# Patient Record
Sex: MALE | Race: WHITE | NOT HISPANIC OR LATINO | Employment: FULL TIME | URBAN - METROPOLITAN AREA
[De-identification: names, ages, dates, MRNs, and addresses within clinical notes are randomized per-mention and may not be internally consistent; named-entity substitution may affect disease eponyms.]

---

## 2023-10-09 ENCOUNTER — APPOINTMENT (OUTPATIENT)
Dept: URGENT CARE | Age: 63
End: 2023-10-09

## 2024-02-05 ENCOUNTER — APPOINTMENT (OUTPATIENT)
Dept: LAB | Age: 64
End: 2024-02-05
Payer: COMMERCIAL

## 2024-02-05 DIAGNOSIS — J18.9 PNEUMONIA DUE TO INFECTIOUS ORGANISM, UNSPECIFIED LATERALITY, UNSPECIFIED PART OF LUNG: ICD-10-CM

## 2024-02-05 LAB
ALBUMIN SERPL BCP-MCNC: 3.8 G/DL (ref 3.5–5)
ALP SERPL-CCNC: 54 U/L (ref 34–104)
ALT SERPL W P-5'-P-CCNC: 111 U/L (ref 7–52)
ANION GAP SERPL CALCULATED.3IONS-SCNC: 9 MMOL/L
AST SERPL W P-5'-P-CCNC: 53 U/L (ref 13–39)
BASOPHILS # BLD AUTO: 0.04 THOUSANDS/ÂΜL (ref 0–0.1)
BASOPHILS NFR BLD AUTO: 1 % (ref 0–1)
BILIRUB SERPL-MCNC: 0.89 MG/DL (ref 0.2–1)
BUN SERPL-MCNC: 17 MG/DL (ref 5–25)
CALCIUM SERPL-MCNC: 8.7 MG/DL (ref 8.4–10.2)
CHLORIDE SERPL-SCNC: 104 MMOL/L (ref 96–108)
CO2 SERPL-SCNC: 25 MMOL/L (ref 21–32)
CREAT SERPL-MCNC: 1.12 MG/DL (ref 0.6–1.3)
EOSINOPHIL # BLD AUTO: 0.13 THOUSAND/ÂΜL (ref 0–0.61)
EOSINOPHIL NFR BLD AUTO: 2 % (ref 0–6)
ERYTHROCYTE [DISTWIDTH] IN BLOOD BY AUTOMATED COUNT: 14.1 % (ref 11.6–15.1)
GFR SERPL CREATININE-BSD FRML MDRD: 70 ML/MIN/1.73SQ M
GLUCOSE SERPL-MCNC: 109 MG/DL (ref 65–140)
HCT VFR BLD AUTO: 49.1 % (ref 36.5–49.3)
HGB BLD-MCNC: 15.9 G/DL (ref 12–17)
IMM GRANULOCYTES # BLD AUTO: 0.03 THOUSAND/UL (ref 0–0.2)
IMM GRANULOCYTES NFR BLD AUTO: 0 % (ref 0–2)
LYMPHOCYTES # BLD AUTO: 1.4 THOUSANDS/ÂΜL (ref 0.6–4.47)
LYMPHOCYTES NFR BLD AUTO: 18 % (ref 14–44)
MCH RBC QN AUTO: 29.4 PG (ref 26.8–34.3)
MCHC RBC AUTO-ENTMCNC: 32.4 G/DL (ref 31.4–37.4)
MCV RBC AUTO: 91 FL (ref 82–98)
MONOCYTES # BLD AUTO: 0.8 THOUSAND/ÂΜL (ref 0.17–1.22)
MONOCYTES NFR BLD AUTO: 10 % (ref 4–12)
NEUTROPHILS # BLD AUTO: 5.43 THOUSANDS/ÂΜL (ref 1.85–7.62)
NEUTS SEG NFR BLD AUTO: 69 % (ref 43–75)
NRBC BLD AUTO-RTO: 0 /100 WBCS
PLATELET # BLD AUTO: 288 THOUSANDS/UL (ref 149–390)
PMV BLD AUTO: 10.2 FL (ref 8.9–12.7)
POTASSIUM SERPL-SCNC: 4.4 MMOL/L (ref 3.5–5.3)
PROT SERPL-MCNC: 6.1 G/DL (ref 6.4–8.4)
RBC # BLD AUTO: 5.41 MILLION/UL (ref 3.88–5.62)
SODIUM SERPL-SCNC: 138 MMOL/L (ref 135–147)
WBC # BLD AUTO: 7.83 THOUSAND/UL (ref 4.31–10.16)

## 2024-02-05 PROCEDURE — 80053 COMPREHEN METABOLIC PANEL: CPT

## 2024-02-05 PROCEDURE — 36415 COLL VENOUS BLD VENIPUNCTURE: CPT

## 2024-02-05 PROCEDURE — 85025 COMPLETE CBC W/AUTO DIFF WBC: CPT

## 2024-02-09 ENCOUNTER — HOSPITAL ENCOUNTER (INPATIENT)
Facility: HOSPITAL | Age: 64
LOS: 12 days | Discharge: HOME HEALTH | DRG: 163 | End: 2024-02-21
Attending: EMERGENCY MEDICINE | Admitting: INTERNAL MEDICINE
Payer: COMMERCIAL

## 2024-02-09 ENCOUNTER — APPOINTMENT (EMERGENCY)
Dept: RADIOLOGY | Facility: HOSPITAL | Age: 64
DRG: 163 | End: 2024-02-09
Payer: COMMERCIAL

## 2024-02-09 DIAGNOSIS — I25.10 CORONARY ARTERY DISEASE INVOLVING NATIVE CORONARY ARTERY OF NATIVE HEART, UNSPECIFIED WHETHER ANGINA PRESENT: ICD-10-CM

## 2024-02-09 DIAGNOSIS — I25.10 CAD IN NATIVE ARTERY: ICD-10-CM

## 2024-02-09 DIAGNOSIS — Z95.1 S/P CABG X 2: Primary | ICD-10-CM

## 2024-02-09 DIAGNOSIS — I42.9 CARDIOMYOPATHY, UNSPECIFIED TYPE (HCC): ICD-10-CM

## 2024-02-09 DIAGNOSIS — I50.20 HEART FAILURE WITH REDUCED EJECTION FRACTION (HCC): ICD-10-CM

## 2024-02-09 DIAGNOSIS — I50.21 ACUTE SYSTOLIC CONGESTIVE HEART FAILURE (HCC): ICD-10-CM

## 2024-02-09 DIAGNOSIS — R91.8 ABNORMAL FINDING ON LUNG IMAGING: ICD-10-CM

## 2024-02-09 DIAGNOSIS — I50.9 CHF (CONGESTIVE HEART FAILURE) (HCC): ICD-10-CM

## 2024-02-09 DIAGNOSIS — I25.10 CORONARY ARTERY DISEASE INVOLVING NATIVE HEART, UNSPECIFIED VESSEL OR LESION TYPE, UNSPECIFIED WHETHER ANGINA PRESENT: ICD-10-CM

## 2024-02-09 PROBLEM — R73.03 PREDIABETES: Status: ACTIVE | Noted: 2024-02-09

## 2024-02-09 PROBLEM — N17.9 AKI (ACUTE KIDNEY INJURY) (HCC): Status: ACTIVE | Noted: 2024-02-09

## 2024-02-09 PROBLEM — R74.01 ELEVATED TRANSAMINASE LEVEL: Status: ACTIVE | Noted: 2024-02-09

## 2024-02-09 PROBLEM — I10 HTN (HYPERTENSION): Chronic | Status: ACTIVE | Noted: 2019-04-11

## 2024-02-09 PROBLEM — J90 PLEURAL EFFUSION: Status: ACTIVE | Noted: 2024-02-09

## 2024-02-09 LAB
2HR DELTA HS TROPONIN: 0 NG/L
4HR DELTA HS TROPONIN: -3 NG/L
ANION GAP SERPL CALCULATED.3IONS-SCNC: 7 MMOL/L
ATRIAL RATE: 113 BPM
BASE EX.OXY STD BLDV CALC-SCNC: 15.2 % (ref 60–80)
BASE EXCESS BLDV CALC-SCNC: -0.5 MMOL/L
BASOPHILS # BLD AUTO: 0.06 THOUSANDS/ÂΜL (ref 0–0.1)
BASOPHILS NFR BLD AUTO: 1 % (ref 0–1)
BNP SERPL-MCNC: 2788 PG/ML (ref 0–100)
BUN SERPL-MCNC: 25 MG/DL (ref 5–25)
CALCIUM SERPL-MCNC: 9.2 MG/DL (ref 8.4–10.2)
CARDIAC TROPONIN I PNL SERPL HS: 32 NG/L
CARDIAC TROPONIN I PNL SERPL HS: 35 NG/L
CARDIAC TROPONIN I PNL SERPL HS: 35 NG/L
CHLORIDE SERPL-SCNC: 104 MMOL/L (ref 96–108)
CO2 SERPL-SCNC: 25 MMOL/L (ref 21–32)
CREAT SERPL-MCNC: 1.38 MG/DL (ref 0.6–1.3)
EOSINOPHIL # BLD AUTO: 0.11 THOUSAND/ÂΜL (ref 0–0.61)
EOSINOPHIL NFR BLD AUTO: 1 % (ref 0–6)
ERYTHROCYTE [DISTWIDTH] IN BLOOD BY AUTOMATED COUNT: 14.4 % (ref 11.6–15.1)
EST. AVERAGE GLUCOSE BLD GHB EST-MCNC: 128 MG/DL
GFR SERPL CREATININE-BSD FRML MDRD: 54 ML/MIN/1.73SQ M
GLUCOSE SERPL-MCNC: 117 MG/DL (ref 65–140)
HBA1C MFR BLD: 6.1 %
HCO3 BLDV-SCNC: 25.2 MMOL/L (ref 24–30)
HCT VFR BLD AUTO: 50.6 % (ref 36.5–49.3)
HGB BLD-MCNC: 16.7 G/DL (ref 12–17)
IMM GRANULOCYTES # BLD AUTO: 0.04 THOUSAND/UL (ref 0–0.2)
IMM GRANULOCYTES NFR BLD AUTO: 0 % (ref 0–2)
LACTATE SERPL-SCNC: 1.9 MMOL/L (ref 0.5–2)
LYMPHOCYTES # BLD AUTO: 1.52 THOUSANDS/ÂΜL (ref 0.6–4.47)
LYMPHOCYTES NFR BLD AUTO: 16 % (ref 14–44)
MCH RBC QN AUTO: 30 PG (ref 26.8–34.3)
MCHC RBC AUTO-ENTMCNC: 33 G/DL (ref 31.4–37.4)
MCV RBC AUTO: 91 FL (ref 82–98)
MONOCYTES # BLD AUTO: 0.89 THOUSAND/ÂΜL (ref 0.17–1.22)
MONOCYTES NFR BLD AUTO: 9 % (ref 4–12)
NEUTROPHILS # BLD AUTO: 6.94 THOUSANDS/ÂΜL (ref 1.85–7.62)
NEUTS SEG NFR BLD AUTO: 73 % (ref 43–75)
NRBC BLD AUTO-RTO: 0 /100 WBCS
O2 CT BLDV-SCNC: 3.3 ML/DL
P AXIS: 51 DEGREES
PCO2 BLDV: 45.4 MM HG (ref 42–50)
PH BLDV: 7.36 [PH] (ref 7.3–7.4)
PLATELET # BLD AUTO: 267 THOUSANDS/UL (ref 149–390)
PMV BLD AUTO: 10 FL (ref 8.9–12.7)
PO2 BLDV: 16.4 MM HG (ref 35–45)
POTASSIUM SERPL-SCNC: 4.3 MMOL/L (ref 3.5–5.3)
PR INTERVAL: 144 MS
QRS AXIS: 116 DEGREES
QRSD INTERVAL: 104 MS
QT INTERVAL: 362 MS
QTC INTERVAL: 496 MS
RBC # BLD AUTO: 5.57 MILLION/UL (ref 3.88–5.62)
SODIUM SERPL-SCNC: 136 MMOL/L (ref 135–147)
T WAVE AXIS: -75 DEGREES
VENTRICULAR RATE: 113 BPM
WBC # BLD AUTO: 9.56 THOUSAND/UL (ref 4.31–10.16)

## 2024-02-09 PROCEDURE — 36415 COLL VENOUS BLD VENIPUNCTURE: CPT

## 2024-02-09 PROCEDURE — 93010 ELECTROCARDIOGRAM REPORT: CPT | Performed by: INTERNAL MEDICINE

## 2024-02-09 PROCEDURE — 82805 BLOOD GASES W/O2 SATURATION: CPT

## 2024-02-09 PROCEDURE — 83605 ASSAY OF LACTIC ACID: CPT

## 2024-02-09 PROCEDURE — 84484 ASSAY OF TROPONIN QUANT: CPT

## 2024-02-09 PROCEDURE — 80048 BASIC METABOLIC PNL TOTAL CA: CPT

## 2024-02-09 PROCEDURE — 80061 LIPID PANEL: CPT

## 2024-02-09 PROCEDURE — 83036 HEMOGLOBIN GLYCOSYLATED A1C: CPT

## 2024-02-09 PROCEDURE — 99285 EMERGENCY DEPT VISIT HI MDM: CPT | Performed by: EMERGENCY MEDICINE

## 2024-02-09 PROCEDURE — NC001 PR NO CHARGE: Performed by: INTERNAL MEDICINE

## 2024-02-09 PROCEDURE — 93005 ELECTROCARDIOGRAM TRACING: CPT

## 2024-02-09 PROCEDURE — 84439 ASSAY OF FREE THYROXINE: CPT

## 2024-02-09 PROCEDURE — 83880 ASSAY OF NATRIURETIC PEPTIDE: CPT

## 2024-02-09 PROCEDURE — 82728 ASSAY OF FERRITIN: CPT

## 2024-02-09 PROCEDURE — 99254 IP/OBS CNSLTJ NEW/EST MOD 60: CPT | Performed by: INTERNAL MEDICINE

## 2024-02-09 PROCEDURE — 99285 EMERGENCY DEPT VISIT HI MDM: CPT

## 2024-02-09 PROCEDURE — 85025 COMPLETE CBC W/AUTO DIFF WBC: CPT

## 2024-02-09 PROCEDURE — 83540 ASSAY OF IRON: CPT

## 2024-02-09 PROCEDURE — 87040 BLOOD CULTURE FOR BACTERIA: CPT

## 2024-02-09 PROCEDURE — 83550 IRON BINDING TEST: CPT

## 2024-02-09 PROCEDURE — 71046 X-RAY EXAM CHEST 2 VIEWS: CPT

## 2024-02-09 RX ORDER — ACETAMINOPHEN 325 MG/1
650 TABLET ORAL EVERY 4 HOURS PRN
Status: DISCONTINUED | OUTPATIENT
Start: 2024-02-09 | End: 2024-02-15

## 2024-02-09 RX ORDER — POLYETHYLENE GLYCOL 3350 17 G/17G
17 POWDER, FOR SOLUTION ORAL DAILY
Status: DISCONTINUED | OUTPATIENT
Start: 2024-02-10 | End: 2024-02-15

## 2024-02-09 RX ORDER — HEPARIN SODIUM 5000 [USP'U]/ML
5000 INJECTION, SOLUTION INTRAVENOUS; SUBCUTANEOUS EVERY 8 HOURS SCHEDULED
Status: DISCONTINUED | OUTPATIENT
Start: 2024-02-09 | End: 2024-02-15

## 2024-02-09 RX ORDER — SENNOSIDES 8.6 MG
1 TABLET ORAL DAILY
Status: DISCONTINUED | OUTPATIENT
Start: 2024-02-10 | End: 2024-02-15

## 2024-02-09 RX ORDER — MAGNESIUM HYDROXIDE/ALUMINUM HYDROXICE/SIMETHICONE 120; 1200; 1200 MG/30ML; MG/30ML; MG/30ML
30 SUSPENSION ORAL EVERY 6 HOURS PRN
Status: DISCONTINUED | OUTPATIENT
Start: 2024-02-09 | End: 2024-02-15

## 2024-02-09 RX ORDER — FUROSEMIDE 10 MG/ML
40 INJECTION INTRAMUSCULAR; INTRAVENOUS 2 TIMES DAILY
Status: DISCONTINUED | OUTPATIENT
Start: 2024-02-09 | End: 2024-02-11

## 2024-02-09 RX ORDER — ONDANSETRON 2 MG/ML
4 INJECTION INTRAMUSCULAR; INTRAVENOUS EVERY 6 HOURS PRN
Status: DISCONTINUED | OUTPATIENT
Start: 2024-02-09 | End: 2024-02-15

## 2024-02-09 RX ADMIN — HEPARIN SODIUM 5000 UNITS: 5000 INJECTION INTRAVENOUS; SUBCUTANEOUS at 22:05

## 2024-02-09 RX ADMIN — FUROSEMIDE 40 MG: 10 INJECTION, SOLUTION INTRAMUSCULAR; INTRAVENOUS at 17:51

## 2024-02-09 NOTE — ED ATTENDING ATTESTATION
2/9/2024  I, Finesse Cloud DO, saw and evaluated the patient. I have discussed the patient with the resident/non-physician practitioner and agree with the resident's/non-physician practitioner's findings, Plan of Care, and MDM as documented in the resident's/non-physician practitioner's note, except where noted. All available labs and Radiology studies were reviewed.  I was present for key portions of any procedure(s) performed by the resident/non-physician practitioner and I was immediately available to provide assistance.       At this point I agree with the current assessment done in the Emergency Department.  I have conducted an independent evaluation of this patient a history and physical is as follows:    18-year-old male with a history of hypertension, not on medications currently, accompanied by his wife.  He says about 3 weeks ago he been having some shortness of breath with exertion, orthopnea, some occasional dry nonproductive cough.  No chest pain, no fever, no chills.  Said he was seen by his primary care physician, had a x-ray performed in the office which was reportedly remarkable for bilateral bacterial pneumonia, he was started on Z-Tad which he completed, he did not get better, then he was prescribed Augmentin, did not get better, seen back and prescribed Levaquin Monday, 4 days ago.  He said his neighbor is a cardiology PA, and noticed the patient seemed to be having increased dyspnea on exertion, so the patient was seen yesterday at the PA's cardiology practice, reportedly had a normal ECG and had an echocardiogram done which reportedly showed a 10 to 20% EF, evidence of heart failure and pericardial effusion.  The patient was recommended to go to the emergency room and here for further management.Patient denies any prolonged travel history, no recent long travel, no immobilizations, or hospitalizations.    General:  Patient is well-appearing  Head:  Atraumatic  Eyes:  Conjunctiva pink  ENT:   Mucous membranes are moist  Neck:  Supple  Cardiac:  S1-S2, without murmurs  Lungs:  Clear to auscultation bilaterally but decreased breath sounds in the right base  Abdomen:  Soft, nontender, normal bowel sounds, no CVA tenderness, no tympany, no rigidity, no guarding  Extremities:  Normal range of motion, patient has some pitting pedal edema, no calf asymmetry radial pulses are equal and symmetric bilaterally  Neurologic:  Awake, fluent speech, normal comprehension, AAOx3  Skin:  Pink warm and dry  Psychiatric:  Alert, pleasant, cooperative      ED Course     ECG interpreted by me, sinus rhythm rate of 113, some nonspecific intraventricular conduction delay and nonspecific anterior T wave changes, no old available for comparison  XR chest pa & lateral   ED Interpretation   Abnormal   Chest x-ray interpreted by me shows trace pulm vascular congestion and a left-sided pleural effusion, no focal consolidation, no old available for comparison          Labs Reviewed   CBC AND DIFFERENTIAL - Abnormal       Result Value Ref Range Status    WBC 9.56  4.31 - 10.16 Thousand/uL Final    RBC 5.57  3.88 - 5.62 Million/uL Final    Hemoglobin 16.7  12.0 - 17.0 g/dL Final    Hematocrit 50.6 (*) 36.5 - 49.3 % Final    MCV 91  82 - 98 fL Final    MCH 30.0  26.8 - 34.3 pg Final    MCHC 33.0  31.4 - 37.4 g/dL Final    RDW 14.4  11.6 - 15.1 % Final    MPV 10.0  8.9 - 12.7 fL Final    Platelets 267  149 - 390 Thousands/uL Final    nRBC 0  /100 WBCs Final    Neutrophils Relative 73  43 - 75 % Final    Immat GRANS % 0  0 - 2 % Final    Lymphocytes Relative 16  14 - 44 % Final    Monocytes Relative 9  4 - 12 % Final    Eosinophils Relative 1  0 - 6 % Final    Basophils Relative 1  0 - 1 % Final    Neutrophils Absolute 6.94  1.85 - 7.62 Thousands/µL Final    Immature Grans Absolute 0.04  0.00 - 0.20 Thousand/uL Final    Lymphocytes Absolute 1.52  0.60 - 4.47 Thousands/µL Final    Monocytes Absolute 0.89  0.17 - 1.22 Thousand/µL Final     "Eosinophils Absolute 0.11  0.00 - 0.61 Thousand/µL Final    Basophils Absolute 0.06  0.00 - 0.10 Thousands/µL Final   BASIC METABOLIC PANEL - Abnormal    Sodium 136  135 - 147 mmol/L Final    Potassium 4.3  3.5 - 5.3 mmol/L Final    Chloride 104  96 - 108 mmol/L Final    CO2 25  21 - 32 mmol/L Final    ANION GAP 7  mmol/L Final    BUN 25  5 - 25 mg/dL Final    Creatinine 1.38 (*) 0.60 - 1.30 mg/dL Final    Comment: Standardized to IDMS reference method    Glucose 117  65 - 140 mg/dL Final    Comment: If the patient is fasting, the ADA then defines impaired fasting glucose as > 100 mg/dL and diabetes as > or equal to 123 mg/dL.    Calcium 9.2  8.4 - 10.2 mg/dL Final    eGFR 54  ml/min/1.73sq m Final    Narrative:     National Kidney Disease Foundation guidelines for Chronic Kidney Disease (CKD):     Stage 1 with normal or high GFR (GFR > 90 mL/min/1.73 square meters)    Stage 2 Mild CKD (GFR = 60-89 mL/min/1.73 square meters)    Stage 3A Moderate CKD (GFR = 45-59 mL/min/1.73 square meters)    Stage 3B Moderate CKD (GFR = 30-44 mL/min/1.73 square meters)    Stage 4 Severe CKD (GFR = 15-29 mL/min/1.73 square meters)    Stage 5 End Stage CKD (GFR <15 mL/min/1.73 square meters)  Note: GFR calculation is accurate only with a steady state creatinine   B-TYPE NATRIURETIC PEPTIDE (BNP) - Abnormal    BNP 2,788 (*) 0 - 100 pg/mL Final   HS TROPONIN I 0HR - Normal    hs TnI 0hr 35  \"Refer to ACS Flowchart\"- see link ng/L Final    Comment:                                              Initial (time 0) result  If >=50 ng/L, Myocardial injury suggested ;  Type of myocardial injury and treatment strategy  to be determined.  If 5-49 ng/L, a delta result at 2 hours and or 4 hours will be needed to further evaluate.  If <4 ng/L, and chest pain has been >3 hours since onset, patient may qualify for discharge based on the HEART score in the ED.  If <5 ng/L and <3hours since onset of chest pain, a delta result at 2 hours will be needed to " further evaluate.    HS Troponin 99th Percentile URL of a Health Population=12 ng/L with a 95% Confidence Interval of 8-18 ng/L.    Second Troponin (time 2 hours)  If calculated delta >= 20 ng/L,  Myocardial injury suggested ; Type of myocardial injury and treatment strategy to be determined.  If 5-49 ng/L and the calculated delta is 5-19 ng/L, consult medical service for evaluation.  Continue evaluation for ischemia on ecg and other possible etiology and repeat hs troponin at 4 hours.  If delta is <5 ng/L at 2 hours, consider discharge based on risk stratification via the HEART score (if in ED), or BRETT risk score in IP/Observation.    HS Troponin 99th Percentile URL of a Health Population=12 ng/L with a 95% Confidence Interval of 8-18 ng/L.   HS TROPONIN I 2HR   TSH, 3RD GENERATION WITH FREE T4 REFLEX   T4, FREE   BLOOD GAS, VENOUS     Patient has new onset CHF, no indication at this point for noninvasive ventilation.  ACS considered unlikely,I do not believe this patient's complaints are from pulmonary embolism and I believe they would most likely be harmed through false positive test results and other complications of testing by further pursuing the diagnosis of pulmonary embolism.    The patient was evaluated for sepsis in the emergency department. After that assessment, at the time of admission, no sepsis, severe sepsis, or septic shock was found.    DIAGNOSIS:  Acute new onset CHF, acute pedal edema, acute orthopnea    MEDICAL DECISION MAKING CODING    Patient presents with acute new problem with:  Threat to life or bodily function    Chronic conditions affecting care: As per HPI    COLLECTION AND INTERPRETATION OF DATA  Additional history obtained from: Wife    I ordered each unique test  Tests reviewed personally by me:  ECG: See my ED course  Labs: See above  Imaging: I independently reviewed the chest x-ray as noted above    Discussion with other providers: Admitting medicine  physician    RISK    Treatment:  Patient admitted    Social Determinants of Health:  Presentation to ED outside of business hours or on night shift            Critical Care Time  Procedures

## 2024-02-09 NOTE — ED PROVIDER NOTES
History  Chief Complaint   Patient presents with    Shortness of Breath     SOB and leg swelling. Recent pneumonia.      HPI    Patient is a 63-year-old male with a history of hypertension who presents with shortness of breath.  Patient reports that over the past 2 weeks he has been experiencing shortness of breath.  He was initially diagnosed with bilateral pneumonia at urgent care and prescribed Augmentin.  When the shortness of breath did not improve they changed him to Levaquin.  His symptoms have continued despite the antibiotics.  He was seen by cardiologist today who did an echo and found that his EF was decreased to 10 to 20%.  He denies chest pain.  He has no history of cardiac disease.  He denies fever.     None       History reviewed. No pertinent past medical history.    History reviewed. No pertinent surgical history.    History reviewed. No pertinent family history.  I have reviewed and agree with the history as documented.    E-Cigarette/Vaping     E-Cigarette/Vaping Substances     Social History     Tobacco Use    Smoking status: Never    Smokeless tobacco: Never   Substance Use Topics    Alcohol use: Yes        Review of Systems   Constitutional:  Negative for chills and fever.   HENT:  Negative for congestion and sore throat.    Eyes:  Negative for photophobia and visual disturbance.   Respiratory:  Positive for cough and shortness of breath.    Cardiovascular:  Negative for chest pain and palpitations.   Gastrointestinal:  Negative for abdominal pain and vomiting.   Genitourinary:  Negative for dysuria and hematuria.   Musculoskeletal:  Negative for back pain and neck pain.   Neurological:  Negative for syncope and headaches.   All other systems reviewed and are negative.      Physical Exam  ED Triage Vitals   Temperature Pulse Respirations Blood Pressure SpO2   02/09/24 1505 02/09/24 1505 02/09/24 1505 02/09/24 1505 02/09/24 1505   (!) 97 °F (36.1 °C) 69 16 (!) 119/101 97 %      Temp Source Heart  Rate Source Patient Position - Orthostatic VS BP Location FiO2 (%)   02/09/24 1900 02/10/24 0308 02/10/24 0749 02/09/24 1900 --   Oral Monitor Lying Left arm       Pain Score       02/09/24 1505       No Pain             Orthostatic Vital Signs  Vitals:    02/10/24 1519 02/10/24 1938 02/10/24 1939 02/10/24 2210   BP: 120/90 118/87 118/87 116/85   Pulse: 92 91 85 98   Patient Position - Orthostatic VS: Lying          Physical Exam  Vitals and nursing note reviewed.   Constitutional:       General: He is not in acute distress.     Appearance: He is well-developed.   HENT:      Head: Normocephalic and atraumatic.      Nose: No congestion or rhinorrhea.      Mouth/Throat:      Mouth: Mucous membranes are moist.   Eyes:      Extraocular Movements: Extraocular movements intact.      Conjunctiva/sclera: Conjunctivae normal.   Cardiovascular:      Rate and Rhythm: Normal rate and regular rhythm.      Heart sounds: No murmur heard.  Pulmonary:      Effort: Pulmonary effort is normal. No respiratory distress.      Breath sounds: No wheezing or rhonchi.      Comments: Diminished bilaterally at bases.   Abdominal:      Palpations: Abdomen is soft.      Tenderness: There is no abdominal tenderness.   Musculoskeletal:      Cervical back: Neck supple.      Right lower leg: No edema.      Left lower leg: No edema.   Skin:     General: Skin is warm and dry.      Capillary Refill: Capillary refill takes less than 2 seconds.   Neurological:      Mental Status: He is alert.   Psychiatric:         Mood and Affect: Mood normal.         ED Medications  Medications   acetaminophen (TYLENOL) tablet 650 mg (has no administration in time range)   furosemide (LASIX) injection 40 mg (40 mg Intravenous Given 2/10/24 1727)   heparin (porcine) subcutaneous injection 5,000 Units (5,000 Units Subcutaneous Given 2/10/24 2140)   senna (SENOKOT) tablet 8.6 mg (8.6 mg Oral Not Given 2/10/24 0826)   polyethylene glycol (MIRALAX) packet 17 g (17 g Oral  Not Given 2/10/24 0823)   ondansetron (ZOFRAN) injection 4 mg (has no administration in time range)   aluminum-magnesium hydroxide-simethicone (MAALOX) oral suspension 30 mL (has no administration in time range)   melatonin tablet 3 mg (3 mg Oral Given 2/10/24 2140)   losartan (COZAAR) tablet 25 mg (25 mg Oral Given 2/10/24 1225)   perflutren lipid microsphere (DEFINITY) injection (0.6 mL/min Intravenous Given 2/10/24 0927)       Diagnostic Studies  Results Reviewed       Procedure Component Value Units Date/Time    Blood culture [[id number]] Collected: 02/09/24 1812    Lab Status: Preliminary result Specimen: Blood from Arm, Right Updated: 02/10/24 2201     Blood Culture No Growth at 24 hrs.    Blood culture [[id number]] Collected: 02/09/24 1815    Lab Status: Preliminary result Specimen: Blood from Hand, Left Updated: 02/10/24 2201     Blood Culture No Growth at 24 hrs.    Comprehensive metabolic panel [[id number]]  (Abnormal) Collected: 02/10/24 0517    Lab Status: Final result Specimen: Blood from Arm, Right Updated: 02/10/24 0602     Sodium 137 mmol/L      Potassium 3.8 mmol/L      Chloride 104 mmol/L      CO2 26 mmol/L      ANION GAP 7 mmol/L      BUN 25 mg/dL      Creatinine 1.27 mg/dL      Glucose 113 mg/dL      Calcium 8.9 mg/dL      Corrected Calcium 9.4 mg/dL      AST 56 U/L       U/L      Alkaline Phosphatase 55 U/L      Total Protein 5.6 g/dL      Albumin 3.4 g/dL      Total Bilirubin 1.02 mg/dL      eGFR 60 ml/min/1.73sq m     Narrative:      National Kidney Disease Foundation guidelines for Chronic Kidney Disease (CKD):     Stage 1 with normal or high GFR (GFR > 90 mL/min/1.73 square meters)    Stage 2 Mild CKD (GFR = 60-89 mL/min/1.73 square meters)    Stage 3A Moderate CKD (GFR = 45-59 mL/min/1.73 square meters)    Stage 3B Moderate CKD (GFR = 30-44 mL/min/1.73 square meters)    Stage 4 Severe CKD (GFR = 15-29 mL/min/1.73 square meters)    Stage 5 End Stage CKD (GFR <15 mL/min/1.73 square  "meters)  Note: GFR calculation is accurate only with a steady state creatinine    Magnesium [[id number]]  (Normal) Collected: 02/10/24 0517    Lab Status: Final result Specimen: Blood from Arm, Right Updated: 02/10/24 0602     Magnesium 2.0 mg/dL     CBC and differential [[id number]] Collected: 02/10/24 0517    Lab Status: Final result Specimen: Blood from Arm, Right Updated: 02/10/24 0535     WBC 9.35 Thousand/uL      RBC 5.23 Million/uL      Hemoglobin 15.3 g/dL      Hematocrit 47.6 %      MCV 91 fL      MCH 29.3 pg      MCHC 32.1 g/dL      RDW 14.1 %      MPV 9.7 fL      Platelets 248 Thousands/uL      nRBC 0 /100 WBCs      Neutrophils Relative 66 %      Immat GRANS % 0 %      Lymphocytes Relative 20 %      Monocytes Relative 10 %      Eosinophils Relative 3 %      Basophils Relative 1 %      Neutrophils Absolute 6.20 Thousands/µL      Immature Grans Absolute 0.03 Thousand/uL      Lymphocytes Absolute 1.86 Thousands/µL      Monocytes Absolute 0.93 Thousand/µL      Eosinophils Absolute 0.26 Thousand/µL      Basophils Absolute 0.07 Thousands/µL     Lipid Panel with Direct LDL reflex [[id number]]  (Abnormal) Collected: 02/09/24 1613    Lab Status: Final result Specimen: Blood from Arm, Left Updated: 02/10/24 0233     Cholesterol 108 mg/dL      Triglycerides 96 mg/dL      HDL, Direct 23 mg/dL      LDL Calculated 66 mg/dL     T4, free [[id number]]  (Abnormal) Collected: 02/09/24 1613    Lab Status: Final result Specimen: Blood from Arm, Left Updated: 02/10/24 0233     Free T4 1.41 ng/dL     Narrative:        \"Therapeutic range for patients medicated with thyroid disorders: 0.61-1.24 ng/dL.\"    TIBC Panel (incl. Iron, TIBC, % Iron Saturation) [[id number]]  (Normal) Collected: 02/09/24 1613    Lab Status: Final result Specimen: Blood from Arm, Left Updated: 02/10/24 0233     Iron Saturation 15 %      TIBC 338 ug/dL      Iron 52 ug/dL      UIBC 286 ug/dL     Ferritin [[id number]]  (Normal) Collected: 02/09/24 1613    Lab " Status: Final result Specimen: Blood from Arm, Left Updated: 02/10/24 0233     Ferritin 121 ng/mL     Hemoglobin A1C [[id number]]  (Abnormal) Collected: 02/09/24 1613    Lab Status: Final result Specimen: Blood from Arm, Left Updated: 02/09/24 2341     Hemoglobin A1C 6.1 %       mg/dl     HS Troponin I 4hr [[id number]]  (Normal) Collected: 02/09/24 2023    Lab Status: Final result Specimen: Blood from Arm, Right Updated: 02/09/24 2106     hs TnI 4hr 32 ng/L      Delta 4hr hsTnI -3 ng/L     HS Troponin I 2hr [[id number]]  (Normal) Collected: 02/09/24 1815    Lab Status: Final result Specimen: Blood from Hand, Left Updated: 02/09/24 1908     hs TnI 2hr 35 ng/L      Delta 2hr hsTnI 0 ng/L     Blood gas, venous [[id number]]  (Abnormal) Collected: 02/09/24 1738    Lab Status: Final result Specimen: Blood from Arm, Left Updated: 02/09/24 1747     pH, Dionicio 7.363     pCO2, Dionicio 45.4 mm Hg      pO2, Dionicio 16.4 mm Hg      HCO3, Dionicio 25.2 mmol/L      Base Excess, Dionicio -0.5 mmol/L      O2 Content, Dionicio 3.3 ml/dL      O2 HGB, VENOUS 15.2 %     HS Troponin 0hr (reflex protocol) [[id number]]  (Normal) Collected: 02/09/24 1613    Lab Status: Final result Specimen: Blood from Arm, Left Updated: 02/09/24 1700     hs TnI 0hr 35 ng/L     B-Type Natriuretic Peptide(BNP) [[id number]]  (Abnormal) Collected: 02/09/24 1613    Lab Status: Final result Specimen: Blood from Arm, Left Updated: 02/09/24 1658     BNP 2,788 pg/mL     Basic metabolic panel [[id number]]  (Abnormal) Collected: 02/09/24 1613    Lab Status: Final result Specimen: Blood from Arm, Left Updated: 02/09/24 1654     Sodium 136 mmol/L      Potassium 4.3 mmol/L      Chloride 104 mmol/L      CO2 25 mmol/L      ANION GAP 7 mmol/L      BUN 25 mg/dL      Creatinine 1.38 mg/dL      Glucose 117 mg/dL      Calcium 9.2 mg/dL      eGFR 54 ml/min/1.73sq m     Narrative:      National Kidney Disease Foundation guidelines for Chronic Kidney Disease (CKD):     Stage 1 with normal or high GFR (GFR  > 90 mL/min/1.73 square meters)    Stage 2 Mild CKD (GFR = 60-89 mL/min/1.73 square meters)    Stage 3A Moderate CKD (GFR = 45-59 mL/min/1.73 square meters)    Stage 3B Moderate CKD (GFR = 30-44 mL/min/1.73 square meters)    Stage 4 Severe CKD (GFR = 15-29 mL/min/1.73 square meters)    Stage 5 End Stage CKD (GFR <15 mL/min/1.73 square meters)  Note: GFR calculation is accurate only with a steady state creatinine    CBC and differential [[id number]]  (Abnormal) Collected: 02/09/24 1613    Lab Status: Final result Specimen: Blood from Arm, Left Updated: 02/09/24 1630     WBC 9.56 Thousand/uL      RBC 5.57 Million/uL      Hemoglobin 16.7 g/dL      Hematocrit 50.6 %      MCV 91 fL      MCH 30.0 pg      MCHC 33.0 g/dL      RDW 14.4 %      MPV 10.0 fL      Platelets 267 Thousands/uL      nRBC 0 /100 WBCs      Neutrophils Relative 73 %      Immat GRANS % 0 %      Lymphocytes Relative 16 %      Monocytes Relative 9 %      Eosinophils Relative 1 %      Basophils Relative 1 %      Neutrophils Absolute 6.94 Thousands/µL      Immature Grans Absolute 0.04 Thousand/uL      Lymphocytes Absolute 1.52 Thousands/µL      Monocytes Absolute 0.89 Thousand/µL      Eosinophils Absolute 0.11 Thousand/µL      Basophils Absolute 0.06 Thousands/µL                    XR chest pa & lateral   ED Interpretation by Finesse Cloud DO (02/09 1717)   Abnormal   Chest x-ray interpreted by me shows trace pulm vascular congestion and a left-sided pleural effusion, no focal consolidation, no old available for comparison      Final Result by Placido Lugo MD (02/09 2300)      Moderate bilateral pleural effusions with bibasilar atelectasis and/or airspace disease noted.      Workstation performed: [id number]               Procedures  ECG 12 Lead Documentation Only    Date/Time: 2/9/2024 3:15 PM    Performed by: Araceli Amos MD  Authorized by: Araceli Amos MD    ECG reviewed by me, the ED Provider: yes    Patient location:  ED  Previous ECG:     Previous  ECG:  Unavailable  Interpretation:     Interpretation: abnormal    Rate:     ECG rate:  113    ECG rate assessment: tachycardic    Rhythm:     Rhythm: sinus tachycardia    Ectopy:     Ectopy: none    QRS:     QRS axis:  Right    QRS intervals:  Normal  Conduction:     Conduction: normal    ST segments:     ST segments:  Abnormal    Elevation:  V1, V2 and V3  T waves:     T waves: non-specific          ED Course  ED Course as of 02/10/24 2227   Fri Feb 09, 2024   1704 BNP(!): 2,788   1704 hs TnI 0hr: 35   1738 SO:    1740 SO: Admitted to [facility] for CHF                                        Medical Decision Making  Patient is a 63-year-old male with a history of hypertension who presents with shortness of breath.    Differential includes CHF, pneumonia, pneumothorax, ACS.  Will order EKG, CBC, BMP, troponin, BNP, and chest x-ray.  EKG shows sinus tachycardia with ST abnormalities.  BNP is elevated.  Creatinine is elevated.  Unknown baseline.  Chest x-ray shows small bilateral pleural effusions.  Will admit patient for new onset failure.  Patient was admitted to medicine.    Amount and/or Complexity of Data Reviewed  Labs: ordered. Decision-making details documented in ED Course.  Radiology: ordered and independent interpretation performed.  ECG/medicine tests: ordered.  Discussion of management or test interpretation with external provider(s): Internal medicine     Risk  Decision regarding hospitalization.          Disposition  Final diagnoses:   CHF (congestive heart failure) (HCC)     Time reflects when diagnosis was documented in both MDM as applicable and the Disposition within this note       Time User Action Codes Description Comment    2/9/2024  5:22 PM Araceli Amos Add [I50.9] CHF (congestive heart failure) (HCC)     2/9/2024  9:27 PM Camila Joy Add [I50.21] Acute systolic congestive heart failure (HCC)     2/10/2024 11:36 AM Kate Barillas Add [I50.20] Heart failure with reduced ejection fraction  (HCC)           ED Disposition       ED Disposition   Admit    Condition   Stable    Date/Time   Fri Feb 9, 2024  5:21 PM    Comment   Case was discussed with SOD and the patient's admission status was agreed to be Admission Status: inpatient status to the service of Dr. Menjivar .               Follow-up Information    None         Current Discharge Medication List        START taking these medications    Details   Empagliflozin (JARDIANCE) 10 MG TABS tablet Take 1 tablet (10 mg total) by mouth daily  Qty: 30 tablet, Refills: 0    Comments: Price check only  Associated Diagnoses: Heart failure with reduced ejection fraction (HCC)      sacubitril-valsartan (Entresto) 24-26 MG TABS Take 1 tablet by mouth 2 (two) times a day  Qty: 60 tablet, Refills: 0    Comments: For price check only  Associated Diagnoses: Heart failure with reduced ejection fraction (HCC)           No discharge procedures on file.    PDMP Review       None             ED Provider  Attending physically available and evaluated Rohan Don. I managed the patient along with the ED Attending.    Electronically Signed by           Araceli Amos MD  02/10/24 1372

## 2024-02-10 ENCOUNTER — APPOINTMENT (INPATIENT)
Dept: NON INVASIVE DIAGNOSTICS | Facility: HOSPITAL | Age: 64
DRG: 163 | End: 2024-02-10
Payer: COMMERCIAL

## 2024-02-10 LAB
ALBUMIN SERPL BCP-MCNC: 3.4 G/DL (ref 3.5–5)
ALP SERPL-CCNC: 55 U/L (ref 34–104)
ALT SERPL W P-5'-P-CCNC: 115 U/L (ref 7–52)
ANION GAP SERPL CALCULATED.3IONS-SCNC: 7 MMOL/L
AORTIC ROOT: 3.7 CM
AORTIC VALVE MEAN VELOCITY: 4.2 M/S
APICAL FOUR CHAMBER EJECTION FRACTION: 15 %
ASCENDING AORTA: 3.3 CM
AST SERPL W P-5'-P-CCNC: 56 U/L (ref 13–39)
AV LVOT MEAN GRADIENT: 0 MMHG
AV LVOT PEAK GRADIENT: 1 MMHG
AV MEAN GRADIENT: 1 MMHG
AV PEAK GRADIENT: 1 MMHG
AV VALVE AREA: 2.86 CM2
AV VELOCITY RATIO: 0.74
BASOPHILS # BLD AUTO: 0.07 THOUSANDS/ÂΜL (ref 0–0.1)
BASOPHILS NFR BLD AUTO: 1 % (ref 0–1)
BILIRUB SERPL-MCNC: 1.02 MG/DL (ref 0.2–1)
BSA FOR ECHO PROCEDURE: 1.8 M2
BUN SERPL-MCNC: 25 MG/DL (ref 5–25)
CALCIUM ALBUM COR SERPL-MCNC: 9.4 MG/DL (ref 8.3–10.1)
CALCIUM SERPL-MCNC: 8.9 MG/DL (ref 8.4–10.2)
CHLORIDE SERPL-SCNC: 104 MMOL/L (ref 96–108)
CHOLEST SERPL-MCNC: 108 MG/DL
CO2 SERPL-SCNC: 26 MMOL/L (ref 21–32)
CREAT SERPL-MCNC: 1.27 MG/DL (ref 0.6–1.3)
DOP CALC AO PEAK VEL: 0.54 M/S
DOP CALC AO VTI: 6.65 CM
DOP CALC LVOT AREA: 3.8 CM2
DOP CALC LVOT DIAMETER: 2.2 CM
DOP CALC LVOT PEAK VEL VTI: 5.01 CM
DOP CALC LVOT PEAK VEL: 0.4 M/S
DOP CALC LVOT STROKE INDEX: 6.9 ML/M2
DOP CALC LVOT STROKE VOLUME: 19.03 CM3
E WAVE DECELERATION TIME: 87 MS
E/A RATIO: 2
EOSINOPHIL # BLD AUTO: 0.26 THOUSAND/ÂΜL (ref 0–0.61)
EOSINOPHIL NFR BLD AUTO: 3 % (ref 0–6)
ERYTHROCYTE [DISTWIDTH] IN BLOOD BY AUTOMATED COUNT: 14.1 % (ref 11.6–15.1)
FERRITIN SERPL-MCNC: 121 NG/ML (ref 24–336)
FRACTIONAL SHORTENING: 10 (ref 28–44)
GFR SERPL CREATININE-BSD FRML MDRD: 60 ML/MIN/1.73SQ M
GLUCOSE SERPL-MCNC: 113 MG/DL (ref 65–140)
HCT VFR BLD AUTO: 47.6 % (ref 36.5–49.3)
HDLC SERPL-MCNC: 23 MG/DL
HGB BLD-MCNC: 15.3 G/DL (ref 12–17)
IMM GRANULOCYTES # BLD AUTO: 0.03 THOUSAND/UL (ref 0–0.2)
IMM GRANULOCYTES NFR BLD AUTO: 0 % (ref 0–2)
INTERVENTRICULAR SEPTUM IN DIASTOLE (PARASTERNAL SHORT AXIS VIEW): 1 CM
INTERVENTRICULAR SEPTUM: 1 CM (ref 0.6–1.1)
IRON SATN MFR SERPL: 15 % (ref 15–50)
IRON SERPL-MCNC: 52 UG/DL (ref 50–212)
LAAS-AP2: 23.2 CM2
LAAS-AP4: 28.3 CM2
LDLC SERPL CALC-MCNC: 66 MG/DL (ref 0–100)
LEFT ATRIUM SIZE: 4 CM
LEFT ATRIUM VOLUME (MOD BIPLANE): 89 ML
LEFT ATRIUM VOLUME INDEX (MOD BIPLANE): 49.4 ML/M2
LEFT INTERNAL DIMENSION IN SYSTOLE: 5.4 CM (ref 2.1–4)
LEFT VENTRICLE DIASTOLIC VOLUME (MOD BIPLANE): 144 ML
LEFT VENTRICLE DIASTOLIC VOLUME INDEX (MOD BIPLANE): 80 ML/M2
LEFT VENTRICLE SYSTOLIC VOLUME (MOD BIPLANE): 120 ML
LEFT VENTRICLE SYSTOLIC VOLUME INDEX (MOD BIPLANE): 66.7 ML/M2
LEFT VENTRICULAR INTERNAL DIMENSION IN DIASTOLE: 6 CM (ref 3.5–6)
LEFT VENTRICULAR POSTERIOR WALL IN END DIASTOLE: 1 CM
LEFT VENTRICULAR STROKE VOLUME: 38 ML
LV EF: 17 %
LVSV (TEICH): 38 ML
LYMPHOCYTES # BLD AUTO: 1.86 THOUSANDS/ÂΜL (ref 0.6–4.47)
LYMPHOCYTES NFR BLD AUTO: 20 % (ref 14–44)
Lab: 1.13
Lab: 2.04 L/MIN
MAGNESIUM SERPL-MCNC: 2 MG/DL (ref 1.9–2.7)
MCH RBC QN AUTO: 29.3 PG (ref 26.8–34.3)
MCHC RBC AUTO-ENTMCNC: 32.1 G/DL (ref 31.4–37.4)
MCV RBC AUTO: 91 FL (ref 82–98)
MONOCYTES # BLD AUTO: 0.93 THOUSAND/ÂΜL (ref 0.17–1.22)
MONOCYTES NFR BLD AUTO: 10 % (ref 4–12)
MV E'TISSUE VEL-SEP: 4 CM/S
MV PEAK A VEL: 0.3 M/S
MV PEAK E VEL: 60 CM/S
MV STENOSIS PRESSURE HALF TIME: 25 MS
MV VALVE AREA P 1/2 METHOD: 8.8
NEUTROPHILS # BLD AUTO: 6.2 THOUSANDS/ÂΜL (ref 1.85–7.62)
NEUTS SEG NFR BLD AUTO: 66 % (ref 43–75)
NRBC BLD AUTO-RTO: 0 /100 WBCS
PLATELET # BLD AUTO: 248 THOUSANDS/UL (ref 149–390)
PMV BLD AUTO: 9.7 FL (ref 8.9–12.7)
POTASSIUM SERPL-SCNC: 3.8 MMOL/L (ref 3.5–5.3)
PROT SERPL-MCNC: 5.6 G/DL (ref 6.4–8.4)
RA PRESSURE ESTIMATED: 15 MMHG
RBC # BLD AUTO: 5.23 MILLION/UL (ref 3.88–5.62)
RIGHT VENTRICLE ID DIMENSION: 4.9 CM
RV PSP: 52 MMHG
SL CV ECHO HEART RATE: 107 BPM
SL CV LEFT ATRIUM LENGTH A2C: 6 CM
SL CV LV EF: 20
SL CV PED ECHO LEFT VENTRICLE DIASTOLIC VOLUME (MOD BIPLANE) 2D: 178 ML
SL CV PED ECHO LEFT VENTRICLE SYSTOLIC VOLUME (MOD BIPLANE) 2D: 140 ML
SODIUM SERPL-SCNC: 137 MMOL/L (ref 135–147)
T4 FREE SERPL-MCNC: 1.41 NG/DL (ref 0.61–1.12)
TIBC SERPL-MCNC: 338 UG/DL (ref 250–450)
TR MAX PG: 37 MMHG
TR PEAK VELOCITY: 3 M/S
TRICUSPID ANNULAR PLANE SYSTOLIC EXCURSION: 1 CM
TRICUSPID VALVE PEAK REGURGITATION VELOCITY: 3.02 M/S
TRIGL SERPL-MCNC: 96 MG/DL
TSH SERPL DL<=0.05 MIU/L-ACNC: 2.38 UIU/ML (ref 0.45–4.5)
UIBC SERPL-MCNC: 286 UG/DL (ref 155–355)
WBC # BLD AUTO: 9.35 THOUSAND/UL (ref 4.31–10.16)

## 2024-02-10 PROCEDURE — 99233 SBSQ HOSP IP/OBS HIGH 50: CPT | Performed by: INTERNAL MEDICINE

## 2024-02-10 PROCEDURE — 93306 TTE W/DOPPLER COMPLETE: CPT | Performed by: INTERNAL MEDICINE

## 2024-02-10 PROCEDURE — 84443 ASSAY THYROID STIM HORMONE: CPT | Performed by: INTERNAL MEDICINE

## 2024-02-10 PROCEDURE — 83735 ASSAY OF MAGNESIUM: CPT

## 2024-02-10 PROCEDURE — 80053 COMPREHEN METABOLIC PANEL: CPT

## 2024-02-10 PROCEDURE — 93306 TTE W/DOPPLER COMPLETE: CPT

## 2024-02-10 PROCEDURE — 85025 COMPLETE CBC W/AUTO DIFF WBC: CPT

## 2024-02-10 RX ORDER — SACUBITRIL AND VALSARTAN 24; 26 MG/1; MG/1
1 TABLET, FILM COATED ORAL 2 TIMES DAILY
Qty: 60 TABLET | Refills: 0 | Status: SHIPPED | OUTPATIENT
Start: 2024-02-10 | End: 2024-02-21

## 2024-02-10 RX ORDER — LOSARTAN POTASSIUM 25 MG/1
25 TABLET ORAL DAILY
Status: DISCONTINUED | OUTPATIENT
Start: 2024-02-10 | End: 2024-02-14

## 2024-02-10 RX ORDER — LANOLIN ALCOHOL/MO/W.PET/CERES
3 CREAM (GRAM) TOPICAL
Status: DISCONTINUED | OUTPATIENT
Start: 2024-02-10 | End: 2024-02-21 | Stop reason: HOSPADM

## 2024-02-10 RX ADMIN — FUROSEMIDE 40 MG: 10 INJECTION, SOLUTION INTRAMUSCULAR; INTRAVENOUS at 17:27

## 2024-02-10 RX ADMIN — HEPARIN SODIUM 5000 UNITS: 5000 INJECTION INTRAVENOUS; SUBCUTANEOUS at 05:18

## 2024-02-10 RX ADMIN — HEPARIN SODIUM 5000 UNITS: 5000 INJECTION INTRAVENOUS; SUBCUTANEOUS at 14:23

## 2024-02-10 RX ADMIN — LOSARTAN POTASSIUM 25 MG: 25 TABLET, FILM COATED ORAL at 12:25

## 2024-02-10 RX ADMIN — FUROSEMIDE 40 MG: 10 INJECTION, SOLUTION INTRAMUSCULAR; INTRAVENOUS at 08:24

## 2024-02-10 RX ADMIN — PERFLUTREN 0.6 ML/MIN: 6.52 INJECTION, SUSPENSION INTRAVENOUS at 09:27

## 2024-02-10 RX ADMIN — MELATONIN 3 MG: at 21:40

## 2024-02-10 RX ADMIN — MELATONIN 3 MG: at 01:29

## 2024-02-10 RX ADMIN — HEPARIN SODIUM 5000 UNITS: 5000 INJECTION INTRAVENOUS; SUBCUTANEOUS at 21:40

## 2024-02-10 NOTE — H&P
INTERNAL MEDICINE RESIDENCY ADMISSION H&P     Name: Rohan Don   Age & Sex: 63 y.o. male   MRN: [id number]  Unit/Bed#: [facility] 528-01   Encounter: [id number]  Primary Care Provider: No primary care provider on file.    Code Status: Level 1 - Full Code  Admission Status: INPATIENT   Disposition: Patient requires Med/Surg with Telemetry    Admit to team: SOD Team A    ASSESSMENT/PLAN     Principal Problem:    Acute systolic congestive heart failure (HCC)  Active Problems:    Prediabetes    DEISI (acute kidney injury) (HCC)    Elevated transaminase level    HTN (hypertension)    Pleural effusion      * Acute systolic congestive heart failure (HCC)  Assessment & Plan  Wt Readings from Last 3 Encounters:   02/09/24 64.9 kg (143 lb)     Noted EF 20% LVEF on outpatient echo per patient. His neighbor, a physician assistant, recommended he come to the cardiac center she works at to get an EKG and TTE. After echo was read, patient was recommended to immediately go to ED for evaluation.  +BNP on admission 2788  Sinus tachycardia on admission  EKG w/L atrial enlargement/hypertrophy, R axis, nonspecific ST changes w/negative troponins x3   ms  QtC 496 ms    Lab Results   Component Value Date    HSTNI0 35 02/09/2024    HSTNI2 35 02/09/2024    HSTNID2 0 02/09/2024    HSTNI4 32 02/09/2024    HSTNID4 -3 02/09/2024         Plan:  Diurese w/40 mg IV lasix BID to start  Goal net negative 500 mL to 1L/day  F/u TTE to evaluate for EF, wall motion patterns, valvular disease, & IVC  Respiratory protocol, maintain O2 sat >88%  Can add BiPAP PRN if patient develops increased WOB  Cardiology consult for AM  Initiate GDMT once stabilized: BB, ACEi/ARB/ARNI, SGLT2i, +/-spironolactone  Defer further workup for etiology e.g. amyloid w/cMRI, ischemic w/u, to cardiology  Nutrition consult  Start telemetry to monitor for arrhythmias   I/Os  Daily standing weights  2L fluid restriction, 2g Na cardiac diet  Check A1C, lipid panel  Can  consider lyme depending on patient's exposures  Check TSH & free T4  Check iron panel to eval for hemachromatosis                 Pleural effusion  Assessment & Plan  Per my wet read of X ray, appears to have mild LLL pleural effusion  Suspect 2/2 CHF    Trial diuresis to trend improvement  Would hold off on thoracentesis unless ruling out infection or malignancy  Can further characterize w/CT if concern for malignancy give recent -10 lbs weight loss, though n obvious risk factors for malignancy and cancer screenings up to date      HTN (hypertension)  Assessment & Plan  Managed with diet/exercise per patient and previously has been well controlled    BP on admission largely normotensive    Plan:  IV lasix 40 mg BID  Goal normotension and MAP>65 mmHg while diuresing        Elevated transaminase level  Assessment & Plan  Noted on 2/5/24 labs (4 days prior to admission)    Suspect congestive hepatopathy from CHF vs. DILI from 3 weeks of abx vs. rarer etiologies such as hemachromatosis    Plan:  Repeat LFTs w/AM labs 2/10 to confirm persistent elevation  Depending on results of repeat labs, can consider RUQ U/S +/- hepatitis labs for chronic exposures  Check iron panel      DEISI (acute kidney injury) (HCC)  Assessment & Plan  Recent Labs     02/09/24  1613   CREATININE 1.38*   EGFR 54     Estimated Creatinine Clearance: 50.9 mL/min (A) (by C-G formula based on SCr of 1.38 mg/dL (H)).    Presumably DEISI as Cr >1.0 by greater than 25% per KDIGO  Suspect prerenal due to CHF as supported by abnormal LFTs (hepatic congestion)  Will need Bmp follow up in 3+ months to determine if component of CKD present  Avoid nephrotoxins  Avoid ACEi/ARNi until stabilized re:CHF    Prediabetes  Assessment & Plan  Managed w/[employer] in [place], [place] (patient's prior residence) with diet/exercise    Check HbA1C in AM        VTE Pharmacologic Prophylaxis: Heparin  VTE Mechanical Prophylaxis: sequential compression device    CHIEF COMPLAINT     Chief  Complaint   Patient presents with    Shortness of Breath     SOB and leg swelling. Recent pneumonia.       HISTORY OF PRESENT ILLNESS     62 yo M hx preDM, HTN managed via diet/exercise presenting for 3 weeks of SOB recently found to have EF 20-25% on outpatient echo.     Patient states that he is new to the area and previously lived in [facility].  He moved here in October 2023 with his wife, who is present at bedside.  Patient has not been able to set up new PCP or health insurance few months.  For this reason, patient went to urgent care 3 separate times to get evaluated for shortness of breath. See below for abx written for each week patient was seen by urgent care.     Symptoms first started 2-3 weeks ago, but have been most pronounced ever since 1/29/24  Severity is rated as moderate, but concerning to the patient as he never had this before.  Timing is constant, worst at night  Overall it is worsening.  It is improved with sitting up or propping himself up at night with several pillows to get more refreshing sleep.   It is worsened by laying down/flat  The patient denies any sick contacts. he isup to date [facility] [place].  The symptoms do awaken the patient from sleep (frequent awakenings).  Patient has tried:  Azithromicyn 500/250 x6 days, followed by  Albuterol HFA inhaler tried for 5 days without improvement  Amox-clav 875-125 x14 days  Levofloxacin 500 mg x 7 days  None of these scripts written at urgent care helped relieve sx. Patient was told he had PNA based on CXR findings at urgent care and was written for additional abx each time he re-presented stating his sx failed to improve.      Patient lives next door to physician assistant, who recommended patient to come to her workplace (heart center) today (day of admission) for EKG & echo. Patient states EKG was WNL, but echo done showed HFrEF 20%. Patient was told to go immediately to ED for further treatment.      Associated sx:  Has recently had a decreased appetite, has lost some weight on the face.  143 lbs now (2/9) down from 156 lbs in October 2023  Patient is still establishing care here, so he has no PCP.  Re: Colonoscopies, etc. - patient states his cancer screenings are up to date w/[facility]    Denies: fevers/chills, chest pain,  heart palpitations, nausea, vomiting, abdominal pain, weakness, or numbness.    Prior medical records:  [place], [place].  Moved to [place] in October 2023 to be closer to daughter-in-law, who lives in [place] and works in [employer].      Work:  x6 years, formerly in [employer] in [employer].  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 No herbal supplements    Admit to SOD-A for acute CHF, newly dx. Full Lv 1 code. No PCP.      REVIEW OF SYSTEMS     Review of Systems   Constitutional:  Positive for fatigue. Negative for chills and fever.   Respiratory:  Positive for shortness of breath. Negative for cough and wheezing.    Cardiovascular:  Negative for chest pain and palpitations.   Gastrointestinal:  Negative for abdominal pain, constipation, diarrhea, nausea and vomiting.   Skin:  Negative for rash.   Neurological:  Negative for weakness and numbness.   Psychiatric/Behavioral:  Positive for sleep disturbance (frequent awakenings, needs multiple pillows to sleep at night).      OBJECTIVE     Vitals:    02/09/24 1505 02/09/24 1707 02/09/24 1900 02/09/24 2227   BP: (!) 119/101 132/97 119/84 118/86   BP Location:   Left arm    Pulse: 69 104  (!) 110   Resp: 16 20 20 22   Temp: (!) 97 °F (36.1 °C)  (!) 97.3 °F (36.3 °C) (!) 97.1 °F (36.2 °C)   TempSrc:   Oral    SpO2: 97% 98%  95%   Weight: 64.9 kg (143 lb)      Height: 5'  "11\" (1.803 m)         Temperature:   Temp (24hrs), Av.1 °F (36.2 °C), Min:97 °F (36.1 °C), Max:97.3 °F (36.3 °C)    Temperature: (!) 97.1 °F (36.2 °C)  Intake & Output:  I/O       None          Weights:   IBW (Ideal Body Weight): 75.3 kg    Body mass index is 19.94 kg/m².  Weight (last 2 days)       Date/Time Weight    24 1505 64.9 (143)          Physical Exam  Vitals reviewed.   Constitutional:       General: He is not in acute distress.  HENT:      Head: Normocephalic and atraumatic.      Mouth/Throat:      Mouth: Mucous membranes are moist.      Pharynx: Oropharynx is clear.   Eyes:      General: No scleral icterus.     Extraocular Movements: Extraocular movements intact.   Cardiovascular:      Rate and Rhythm: Regular rhythm. Tachycardia present.      Heart sounds: No murmur heard.     No friction rub. No gallop.   Pulmonary:      Effort: Pulmonary effort is normal. No respiratory distress.      Breath sounds: No stridor. No wheezing.      Comments: Diminished BS LLL  Abdominal:      General: Bowel sounds are normal. There is no distension.      Palpations: There is no mass.      Tenderness: There is no abdominal tenderness. There is no guarding.   Musculoskeletal:         General: Normal range of motion.   Skin:     General: Skin is warm and dry.      Findings: No rash.   Neurological:      Mental Status: He is alert.      Sensory: No sensory deficit.   Psychiatric:         Mood and Affect: Mood normal.         Behavior: Behavior normal.         Thought Content: Thought content normal.       PAST MEDICAL HISTORY   No past medical history on file.  PAST SURGICAL HISTORY   No past surgical history on file.  SOCIAL & FAMILY HISTORY     Social History     Substance and Sexual Activity   Alcohol Use Not on file       Social History     Substance and Sexual Activity   Drug Use Not on file     Social History     Tobacco Use   Smoking Status Not on file   Smokeless Tobacco Not on file     No family history on " "file.  LABORATORY DATA     Labs: I have personally reviewed pertinent reports.    Results from last 7 days   Lab Units 02/09/24  1613 02/05/24  1145   WBC Thousand/uL 9.56 7.83   HEMOGLOBIN g/dL 16.7 15.9   HEMATOCRIT % 50.6* 49.1   PLATELETS Thousands/uL 267 288   NEUTROS PCT % 73 69   MONOS PCT % 9 10   EOS PCT % 1 2      Results from last 7 days   Lab Units 02/09/24  1613 02/05/24  1145   POTASSIUM mmol/L 4.3 4.4   CHLORIDE mmol/L 104 104   CO2 mmol/L 25 25   BUN mg/dL 25 17   CREATININE mg/dL 1.38* 1.12   CALCIUM mg/dL 9.2 8.7   ALK PHOS U/L  --  54   ALT U/L  --  111*   AST U/L  --  53*                  Results from last 7 days   Lab Units 02/09/24  1925   LACTIC ACID mmol/L 1.9         Micro:  No results found for: \"BLOODCX\", \"URINECX\", \"WOUNDCULT\", \"SPUTUMCULTUR\"  IMAGING & DIAGNOSTIC TESTS     Imaging: I have personally reviewed pertinent reports.    No results found.  EKG, Pathology, and Other Studies: I have personally reviewed pertinent reports.     ALLERGIES   No Known Allergies  MEDICATIONS PRIOR TO ARRIVAL     Prior to Admission medications    Not on File     MEDICATIONS ADMINISTERED IN LAST 24 HOURS     Medication Administration - last 24 hours from 02/08/2024 2229 to 02/09/2024 2229         Date/Time Order Dose Route Action Action by     02/09/2024 1751 EST furosemide (LASIX) injection 40 mg 40 mg Intravenous Given Eliane Huber RN     02/09/2024 2205 EST heparin (porcine) subcutaneous injection 5,000 Units 5,000 Units Subcutaneous Given Candida Hong RN          CURRENT MEDICATIONS     Current Facility-Administered Medications   Medication Dose Route Frequency Provider Last Rate    acetaminophen  650 mg Oral Q4H PRN Camila Joy MD      aluminum-magnesium hydroxide-simethicone  30 mL Oral Q6H PRN Camila Joy MD      furosemide  40 mg Intravenous BID Camila Joy MD      heparin (porcine)  5,000 Units Subcutaneous Q8H [facility] Camila Joy MD      ondansetron  4 mg Intravenous Q6H " "PRN Camila Joy MD      [START ON 2/10/2024] polyethylene glycol  17 g Oral Daily Camila Joy MD      [START ON 2/10/2024] senna  1 tablet Oral Daily Camila Joy MD          acetaminophen, 650 mg, Q4H PRN  aluminum-magnesium hydroxide-simethicone, 30 mL, Q6H PRN  ondansetron, 4 mg, Q6H PRN        Admission Time  I spent 45 minutes admitting the patient.  This involved direct patient contact where I performed a full history and physical, reviewing previous records, and reviewing laboratory and other diagnostic studies.    Portions of the record may have been created with voice recognition software.  Occasional wrong word or \"sound a like\" substitutions may have occurred due to the inherent limitations of voice recognition software.  Read the chart carefully and recognize, using context, where substitutions have occurred.    ==  Camila Joy MD  [facility]  Internal Medicine Residency PGY-3    " [employer]

## 2024-02-10 NOTE — ASSESSMENT & PLAN NOTE
Noted on 2/5/24 labs (4 days prior to admission). Suspect congestive hepatopathy from CHF vs. DILI from 3 weeks of abx vs. rarer etiologies such as hemachromatosis    Plan:  F/U daily CMP  Consider adding viral hepatitis screen  Depending on results of repeat labs, can consider RUQ U/S +/- hepatitis labs for chronic exposures

## 2024-02-10 NOTE — ASSESSMENT & PLAN NOTE
Managed with diet/exercise per patient and previously has been well controlled  BP on admission largely normotensive    Plan:  Currently on Losartan 25 mg daily and Metoprolol 25 mg BID  Will start Spironolactone 25mg QD prior to discharge  Heart failure following; appreciate recs  Goal normotension and MAP>65 mmHg while diuresing

## 2024-02-10 NOTE — ASSESSMENT & PLAN NOTE
Recent Labs     02/13/24  0505 02/14/24  0534 02/15/24  0533   CREATININE 1.15 0.96 0.90   EGFR 67 84 91     Estimated Creatinine Clearance: 67.6 mL/min (by C-G formula based on SCr of 0.9 mg/dL).  Presumably DEISI as Cr >1.0 by greater than 25% per KDIGO  Suspect prerenal due to CHF as supported by abnormal LFTs (hepatic congestion)    Plan:  Continue to monitor kidney function  Avoid nephrotoxins as able  Outpatient BMP follow up in 3+ months to determine if component of CKD present

## 2024-02-10 NOTE — PROGRESS NOTES
INTERNAL MEDICINE RESIDENCY PROGRESS NOTE     Name: Rohan Don   Age & Sex: 63 y.o. male   MRN: [id number]  Unit/Bed#: [facility] 528-01   Encounter: [id number]  Team: SOD Team A    PATIENT INFORMATION     Name: Rohan Don   Age & Sex: 63 y.o. male   MRN: [id number]  Hospital Stay Days: 1    ASSESSMENT/PLAN     Principal Problem:    Acute systolic congestive heart failure (HCC)  Active Problems:    Prediabetes    DEISI (acute kidney injury) (HCC)    Elevated transaminase level    HTN (hypertension)    Pleural effusion      Pleural effusion  Assessment & Plan  Per my wet read of X ray, appears to have mild LLL pleural effusion  Suspect 2/2 CHF  - Cardiopulmonary exam improved today s/p diuresis.     Trial diuresis to trend improvement  Would hold off on thoracentesis unless ruling out infection or malignancy  Can further characterize w/CT if concern for malignancy give recent -10 lbs weight loss, though n obvious risk factors for malignancy and cancer screenings up to date      HTN (hypertension)  Assessment & Plan  Managed with diet/exercise per patient and previously has been well controlled    BP on admission largely normotensive    Plan:  IV lasix 40 mg BID  Goal normotension and MAP>65 mmHg while diuresing        Elevated transaminase level  Assessment & Plan  Noted on 2/5/24 labs (4 days prior to admission)    Suspect congestive hepatopathy from CHF vs. DILI from 3 weeks of abx vs. rarer etiologies such as hemachromatosis  - Increased LFTs with ~ decreased TP and Alb on CMP today  - Iron panel unremarkable    Plan:  - F/U daily CMP  - ?Consider adding viral hepatitis screen  Depending on results of repeat labs, can consider RUQ U/S +/- hepatitis labs for chronic exposures        DEISI (acute kidney injury) (HCC)  Assessment & Plan  Recent Labs     02/09/24  1613 02/10/24  0517   CREATININE 1.38* 1.27   EGFR 54 60     Estimated Creatinine Clearance: 50.9 mL/min (A) (by C-G formula based on SCr of 1.38 mg/dL  (H)).    Presumably DEISI as Cr >1.0 by greater than 25% per KDIGO  Suspect prerenal due to CHF as supported by abnormal LFTs (hepatic congestion)  Will need Bmp follow up in 3+ months to determine if component of CKD present  Improving today per CMP. Likely DEISI on CKD  Avoid nephrotoxins  Avoid ACEi/ARNi until stabilized re:CHF    Prediabetes  Assessment & Plan  Managed w/[facility] in [place], [place] (patient's prior residence) with diet/exercise    Check HbA1C in AM    * Acute systolic congestive heart failure (HCC)  Assessment & Plan  Wt Readings from Last 3 Encounters:   02/09/24 64.9 kg (143 lb)     Noted EF 20% LVEF on outpatient echo per patient. His neighbor, a physician assistant, recommended he come to the cardiac center she works at to get an EKG and TTE. After echo was read, patient was recommended to immediately go to ED for evaluation.  +BNP on admission 2788  Sinus tachycardia on admission  EKG w/L atrial enlargement/hypertrophy, R axis, nonspecific ST changes w/negative troponins x3   ms  QtC 496 ms    Lab Results   Component Value Date    HSTNI0 35 02/09/2024    HSTNI2 35 02/09/2024    HSTNID2 0 02/09/2024    HSTNI4 32 02/09/2024    HSTNID4 -3 02/09/2024     Significant improvement today s/p IV Lasix 40 x2. Has put out ~1.1L over past 24 hours. No is without peripheral edema and lungs clear.    Plan:  C/w IV Lasix 40 bid  Goal net negative 500 mL to 1L/day  F/U final TTE read  Respiratory protocol, maintain O2 sat >88%  Can add BiPAP PRN if patient develops increased WOB  To defer GDMT initiation, further W/U per Cards  Nutrition consult  Start telemetry to monitor for arrhythmias   I/Os  Daily standing weights  2L fluid restriction, 2g Na cardiac diet  Check A1C, lipid panel  Can consider lyme depending on patient's exposures                      Disposition: To remain IP for cards w/u. May begin dispo planning shortly once OP plans secured.     SUBJECTIVE / INTERVAL HISTORY     ELHAM, Mr Don was  seen earlier this AM resting comfortably in hospital bed. He states that he was unable to get sleep b/c/o Overall hospital surroundings (e.g. loud noises, overnight vitals).  However he does deny any orthopnea/PND overnight, and states that he slept flat on his back with HOB at 0 degrees.    Furthermore he denies any significant dyspnea on exertion or shortness of breath.  He states he has been able to get up and walk hallways of the [facility]/go to the restroom without any breathing difficulties.  Also states that his lower extremity edema, which per patient was significant, has resolved overnight.    Explored further possible etiologies of current cardiac abnormalities during this a.m.'s exam.  He denies any prolonged time spent in wooded areas/outdoor activities in [place] along with no tick/bug/animal bites.  Does not smoke, drinks socially about 2-3 times per week.  Was in  earlier in his life, and he says that he would binge on occasion but never had any long-term heavy alcohol use.  Also again denies any illicit drug use.    He also denies any family history of cardio issues.  He has not had any congenital cardiac birth defects, nor told he has had any murmurs throughout his life.  He says that his son does have a murmur with confirmed ?Cardiomegaly, however he was told this was secondary to stimulant use for ADHD.    Review of Systems   Constitutional:  Negative for chills and fever.   HENT:  Negative for ear pain and sore throat.    Eyes:  Negative for pain and visual disturbance.   Respiratory:  Negative for cough and shortness of breath.    Cardiovascular:  Negative for chest pain and palpitations.   Gastrointestinal:  Negative for abdominal pain and vomiting.   Genitourinary:  Negative for dysuria and hematuria.   Musculoskeletal:  Negative for arthralgias and back pain.   Skin:  Negative for color change and rash.   Neurological:  Negative for seizures and syncope.   All other systems reviewed and  "are negative.      OBJECTIVE     Vitals:    02/10/24 0749 02/10/24 0857 02/10/24 0900 02/10/24 1026   BP: 127/96 127/96  122/91   BP Location: Left arm      Pulse: 103 103  95   Resp: 16      Temp: (!) 97.4 °F (36.3 °C)   (!) 97.2 °F (36.2 °C)   TempSrc: Oral      SpO2: 94%  94% 94%   Weight:  62.1 kg (137 lb)     Height:  5' 11\" (1.803 m)          Temperature:   Temp (24hrs), Av.3 °F (36.3 °C), Min:97 °F (36.1 °C), Max:97.4 °F (36.3 °C)    Temperature: (!) 97.2 °F (36.2 °C)    Intake & Output:  I/O          0701   0700  0701  02/10 0700 02/10 0701   07    P.O.  600 500    Total Intake(mL/kg)  600 (9.6) 500 (8.1)    Urine (mL/kg/hr)  600 1625 (3.4)    Total Output  600 1625    Net  0 -1125           Unmeasured Urine Occurrence  3 x             Weights:   IBW (Ideal Body Weight): 75.3 kg    Body mass index is 19.11 kg/m².  Weight (last 2 days)       Date/Time Weight    02/10/24 0857 62.1 (137)    02/10/24 0520 62.4 (137.57)    24 22:27:01 64.9 (143)    24 1505 64.9 (143)              Physical Exam:     Physical Exam  Vitals and nursing note reviewed.   Constitutional:       General: He is not in acute distress.     Appearance: He is well-developed.   HENT:      Head: Normocephalic and atraumatic.   Eyes:      Conjunctiva/sclera: Conjunctivae normal.   Neck:      Comments: No JVD appreciated  Cardiovascular:      Rate and Rhythm: Normal rate and regular rhythm.      Pulses: Normal pulses.      Heart sounds: No murmur heard.     Comments: Somewhat distant heart sounds  No murmur/rub/gallops  No extra heart sounds  Pulmonary:      Effort: Pulmonary effort is normal. No respiratory distress.      Breath sounds: Normal breath sounds. No wheezing or rales.      Comments: No crackles/Rales heard throughout posterior lung fields  Abdominal:      Palpations: Abdomen is soft.      Tenderness: There is no abdominal tenderness.   Musculoskeletal:         General: No swelling.      Cervical " back: Neck supple.      Right lower leg: No edema.      Left lower leg: No edema.      Comments: Strikingly nonedematous lower extremities   Skin:     General: Skin is warm and dry.      Capillary Refill: Capillary refill takes less than 2 seconds.   Neurological:      Mental Status: He is alert.   Psychiatric:         Mood and Affect: Mood normal.         LABORATORY DATA     Labs: I have personally reviewed pertinent reports.  Results from last 7 days   Lab Units 02/10/24  0517 02/09/24  1613 02/05/24  1145   WBC Thousand/uL 9.35 9.56 7.83   HEMOGLOBIN g/dL 15.3 16.7 15.9   HEMATOCRIT % 47.6 50.6* 49.1   PLATELETS Thousands/uL 248 267 288   NEUTROS PCT % 66 73 69   MONOS PCT % 10 9 10   EOS PCT % 3 1 2      Results from last 7 days   Lab Units 02/10/24  0517 02/09/24  1613 02/05/24  1145   POTASSIUM mmol/L 3.8 4.3 4.4   CHLORIDE mmol/L 104 104 104   CO2 mmol/L 26 25 25   BUN mg/dL 25 25 17   CREATININE mg/dL 1.27 1.38* 1.12   CALCIUM mg/dL 8.9 9.2 8.7   ALK PHOS U/L 55  --  54   ALT U/L 115*  --  111*   AST U/L 56*  --  53*     Results from last 7 days   Lab Units 02/10/24  0517   MAGNESIUM mg/dL 2.0              Results from last 7 days   Lab Units 02/09/24  1925   LACTIC ACID mmol/L 1.9           IMAGING & DIAGNOSTIC TESTING     Radiology Results: I have personally reviewed pertinent reports.  XR chest pa & lateral    Result Date: 2/9/2024  Impression: Moderate bilateral pleural effusions with bibasilar atelectasis and/or airspace disease noted. Workstation performed: [id number]     Other Diagnostic Testing: I have personally reviewed pertinent reports.    ACTIVE MEDICATIONS     Current Facility-Administered Medications   Medication Dose Route Frequency    acetaminophen (TYLENOL) tablet 650 mg  650 mg Oral Q4H PRN    aluminum-magnesium hydroxide-simethicone (MAALOX) oral suspension 30 mL  30 mL Oral Q6H PRN    furosemide (LASIX) injection 40 mg  40 mg Intravenous BID    heparin (porcine) subcutaneous injection  "5,000 Units  5,000 Units Subcutaneous Q8H JENN    losartan (COZAAR) tablet 25 mg  25 mg Oral Daily    melatonin tablet 3 mg  3 mg Oral HS    ondansetron (ZOFRAN) injection 4 mg  4 mg Intravenous Q6H PRN    polyethylene glycol (MIRALAX) packet 17 g  17 g Oral Daily    senna (SENOKOT) tablet 8.6 mg  1 tablet Oral Daily       VTE Pharmacologic Prophylaxis: {Heparin  VTE Mechanical Prophylaxis: sequential compression device    Portions of the record may have been created with voice recognition software.  Occasional wrong word or \"sound a like\" substitutions may have occurred due to the inherent limitations of voice recognition software.  Read the chart carefully and recognize, using context, where substitutions have occurred.    == == == == == == == == == == == == == == == == == == == == ==     Kip Reardon MD  [facility] [place]  Internal Medicine Resident, PGY-2      "

## 2024-02-10 NOTE — CONSULTS
Consultation - Cardiology  Rohan Don 63 y.o. male MRN: [id number]  Unit/Bed#: [facility] 528-01 Encounter: [id number]      Inpatient consult to Cardiology  Consult performed by: Irwin Mosqueda MD  Consult ordered by: Camila Joy MD        History of Present Illness   Physician Requesting Consult: Roxy Menjivar MD  Reason for Consult / Principal Problem: CHF    Assessment/Plan   Assessment/Plan:  63-year-old with no significant past medical history being admitted to the hospital with acute systolic heart failure.    # Acute systolic heart failure  10 days of symptoms of shortness of breath, cough, weakness, leg swelling found to have LVEF 20 to 25% with global hypokinesis with dilated LV and dilated RV.  Unclear etiology for the cardiomyopathy but it seems as though it has been going on for some time given the dilated nature of his LV.    -Etiology unclear at this time, will need ischemic workup.  TSH ordered  -TTE ordered for record here  -Volume: overlaoded now (edema and elevated JVP)- on lasix 40mg IV BID  -GDMT: will need GDMT once euvolemic (*pt does not have insurance yet)  -tele, I/O, daily weights    #?recent pneumonia  Patient reports he was diagnosed with pneumonia about a week ago by urgent care.  I suspect he did not have pneumonia and his symptoms are due to CHF.  No evidence of active infection now.    HPI: Rohan Don is a 63 y.o. year old male with past medical history of eczema who presents with 10 days of cough and shortness of breath and found to have reduced ejection fraction at outpatient echo admitted for acute heart failure exacerbation.      Patient went to urgent care over a week ago and was complaining of dry cough and shortness of breath.  He was told he was diagnosed with pneumonia and started antibiotics.  Symptoms continued and he is felt very fatigued and also noticed leg swelling.  He had an echocardiogram done by the heart care group today which showed LVEF 20 to 25%,  dilated LV, moderate MR, mildly elevated RVSP, moderate RV dilation, hypokinetic RV free wall.  He was told to go to the emergency department.    In the ED  Temp 97, heart rate 69, respiratory rate 16, blood pressure 119/101 (now 119/84) 97% on room air  Labs  , K4.3, CR 1.38, Trope 35, BNP 2788, WBC 9, Hgb 16,   EKG: Sinus tachycardia with right axis deviation and nonspecific ST wave changes    Social: The [place]  He is a never smoker and drinks 1-2 drinks a day.  He just applied for health insurance here and has not kicked in yet will kick in at the end of the month.  His daughter works in the area which is why they moved out.  No drug use.    Historical Information   No past medical history on file.  No past surgical history on file.  Social History     Substance and Sexual Activity   Alcohol Use Not on file     Social History     Substance and Sexual Activity   Drug Use Not on file     Social History     Tobacco Use   Smoking Status Not on file   Smokeless Tobacco Not on file     Family History: No family history of CHF    Meds/Allergies   Hospital Medications:   Current Facility-Administered Medications   Medication Dose Route Frequency   • acetaminophen (TYLENOL) tablet 650 mg  650 mg Oral Q4H PRN   • aluminum-magnesium hydroxide-simethicone (MAALOX) oral suspension 30 mL  30 mL Oral Q6H PRN   • furosemide (LASIX) injection 40 mg  40 mg Intravenous BID   • heparin (porcine) subcutaneous injection 5,000 Units  5,000 Units Subcutaneous Q8H JENN   • ondansetron (ZOFRAN) injection 4 mg  4 mg Intravenous Q6H PRN   • [START ON 2/10/2024] polyethylene glycol (MIRALAX) packet 17 g  17 g Oral Daily   • [START ON 2/10/2024] senna (SENOKOT) tablet 8.6 mg  1 tablet Oral Daily     Home Medications:   No medications prior to admission.       No Known Allergies    Objective   Vitals: Blood pressure 119/84, pulse 104, temperature (!) 97.3 °F (36.3 °C),  "temperature source Oral, resp. rate 20, height 5' 11\" (1.803 m), weight 64.9 kg (143 lb), SpO2 98%.  Orthostatic Blood Pressures      Flowsheet Row Most Recent Value   Blood Pressure 119/84 filed at 02/09/2024 1900            No intake or output data in the 24 hours ending 02/09/24 1951    Invasive Devices       Peripheral Intravenous Line  Duration             Peripheral IV 02/09/24 Left;Proximal;Ventral (anterior) Forearm <1 day                    Review of Systems:  ROS  ROS as noted above, otherwise 12 point review of systems was performed and is negative.     Physical Exam:  Physical Exam  Constitutional:       Appearance: Normal appearance.   HENT:      Head: Normocephalic and atraumatic.   Neck:      Vascular: JVP is 12 cm  Cardiovascular:      Rate and Rhythm: RRR, no murmurs  Pulmonary:      Effort: CTA-b  Abdominal:      General: Abdomen is flat. Bowel sounds are normal.      Palpations: Abdomen is soft.   Musculoskeletal:   2+ lower extremity pitting edema  Skin:     General: Eczema on right arm  Neurological:      Mental Status: He is alert and oriented to person, place, and time.   Psychiatric:         Behavior: Behavior normal.     Lab Results: I have personally reviewed pertinent lab results.    Results from last 7 days   Lab Units 02/09/24  1613 02/05/24  1145   WBC Thousand/uL 9.56 7.83   HEMOGLOBIN g/dL 16.7 15.9   HEMATOCRIT % 50.6* 49.1   PLATELETS Thousands/uL 267 288     Results from last 7 days   Lab Units 02/09/24  1613 02/05/24  1145   POTASSIUM mmol/L 4.3 4.4   CHLORIDE mmol/L 104 104   CO2 mmol/L 25 25   BUN mg/dL 25 17   CREATININE mg/dL 1.38* 1.12   CALCIUM mg/dL 9.2 8.7              "

## 2024-02-10 NOTE — PHYSICAL THERAPY NOTE
Physical Therapy Cancellation Note         02/10/24 5646   Note Type   Note type Cancelled Session;Evaluation   Cancel Reasons Patient off floor/test     PT orders received and chart review complete. Attempted to see patient however patient currently receiving an echocardiogram. Will continue to follow and evaluate as able.    Jasmin Agosto, PT

## 2024-02-10 NOTE — OCCUPATIONAL THERAPY NOTE
Occupational Therapy Cancellation Note       02/10/24 0921   Note Type   Note type Cancelled Session   Cancel Reasons Patient off floor/test     Orders received and chart reviewed. OT attempted to see 2x this AM, however patient patient currently receiving an echocardiogram. . Will continue to follow to be seen for OT evaluation as appropriate/when medically cleared.       Janett Carmona MS, OTR/L

## 2024-02-10 NOTE — ASSESSMENT & PLAN NOTE
CT chest/abd/pelvis (2/12/24) showed moderate bilateral pleural effusions and bibasilar atelectasis. Likely 2/2 CHF    Plan:  Refer to congestive CHF A&P

## 2024-02-10 NOTE — ASSESSMENT & PLAN NOTE
Echo (2/10/24) found LVEF of 20%. +BNP on admission 1768.     Plan:  Heart failure following; appreciate recs  Euvolemic  Continue 40 mg PO Lasix daily  Continue to monitor I/Os and weights  Continue Losartan 25 mg daily and Metoprolol 25 mg BID  Will start Spironolactone 25mg QD prior to discharge  Respiratory protocol, maintain O2 sat >88%  Cardiac diet

## 2024-02-11 ENCOUNTER — PREP FOR PROCEDURE (OUTPATIENT)
Dept: CARDIOLOGY CLINIC | Facility: CLINIC | Age: 64
End: 2024-02-11

## 2024-02-11 DIAGNOSIS — I42.9 CARDIOMYOPATHY, UNSPECIFIED TYPE (HCC): Primary | ICD-10-CM

## 2024-02-11 PROBLEM — E43 SEVERE PROTEIN-CALORIE MALNUTRITION (HCC): Status: ACTIVE | Noted: 2024-02-11

## 2024-02-11 LAB
ALBUMIN SERPL BCP-MCNC: 3.3 G/DL (ref 3.5–5)
ALP SERPL-CCNC: 53 U/L (ref 34–104)
ALT SERPL W P-5'-P-CCNC: 81 U/L (ref 7–52)
ANION GAP SERPL CALCULATED.3IONS-SCNC: 7 MMOL/L
AST SERPL W P-5'-P-CCNC: 32 U/L (ref 13–39)
BASOPHILS # BLD AUTO: 0.08 THOUSANDS/ÂΜL (ref 0–0.1)
BASOPHILS NFR BLD AUTO: 1 % (ref 0–1)
BILIRUB SERPL-MCNC: 0.91 MG/DL (ref 0.2–1)
BUN SERPL-MCNC: 24 MG/DL (ref 5–25)
CALCIUM ALBUM COR SERPL-MCNC: 9.7 MG/DL (ref 8.3–10.1)
CALCIUM SERPL-MCNC: 9.1 MG/DL (ref 8.4–10.2)
CHLORIDE SERPL-SCNC: 104 MMOL/L (ref 96–108)
CO2 SERPL-SCNC: 28 MMOL/L (ref 21–32)
CREAT SERPL-MCNC: 1.04 MG/DL (ref 0.6–1.3)
EOSINOPHIL # BLD AUTO: 0.31 THOUSAND/ÂΜL (ref 0–0.61)
EOSINOPHIL NFR BLD AUTO: 5 % (ref 0–6)
ERYTHROCYTE [DISTWIDTH] IN BLOOD BY AUTOMATED COUNT: 13.9 % (ref 11.6–15.1)
GFR SERPL CREATININE-BSD FRML MDRD: 76 ML/MIN/1.73SQ M
GLUCOSE SERPL-MCNC: 104 MG/DL (ref 65–140)
HCT VFR BLD AUTO: 47.3 % (ref 36.5–49.3)
HGB BLD-MCNC: 16 G/DL (ref 12–17)
IMM GRANULOCYTES # BLD AUTO: 0.03 THOUSAND/UL (ref 0–0.2)
IMM GRANULOCYTES NFR BLD AUTO: 0 % (ref 0–2)
LYMPHOCYTES # BLD AUTO: 1.32 THOUSANDS/ÂΜL (ref 0.6–4.47)
LYMPHOCYTES NFR BLD AUTO: 20 % (ref 14–44)
MCH RBC QN AUTO: 29.6 PG (ref 26.8–34.3)
MCHC RBC AUTO-ENTMCNC: 33.8 G/DL (ref 31.4–37.4)
MCV RBC AUTO: 88 FL (ref 82–98)
MONOCYTES # BLD AUTO: 0.8 THOUSAND/ÂΜL (ref 0.17–1.22)
MONOCYTES NFR BLD AUTO: 12 % (ref 4–12)
NEUTROPHILS # BLD AUTO: 4.14 THOUSANDS/ÂΜL (ref 1.85–7.62)
NEUTS SEG NFR BLD AUTO: 62 % (ref 43–75)
NRBC BLD AUTO-RTO: 0 /100 WBCS
PLATELET # BLD AUTO: 245 THOUSANDS/UL (ref 149–390)
PMV BLD AUTO: 9.4 FL (ref 8.9–12.7)
POTASSIUM SERPL-SCNC: 3.4 MMOL/L (ref 3.5–5.3)
PROT SERPL-MCNC: 5.7 G/DL (ref 6.4–8.4)
RBC # BLD AUTO: 5.4 MILLION/UL (ref 3.88–5.62)
SODIUM SERPL-SCNC: 139 MMOL/L (ref 135–147)
WBC # BLD AUTO: 6.68 THOUSAND/UL (ref 4.31–10.16)

## 2024-02-11 PROCEDURE — 99239 HOSP IP/OBS DSCHRG MGMT >30: CPT | Performed by: INTERNAL MEDICINE

## 2024-02-11 PROCEDURE — 97163 PT EVAL HIGH COMPLEX 45 MIN: CPT

## 2024-02-11 PROCEDURE — 99232 SBSQ HOSP IP/OBS MODERATE 35: CPT | Performed by: INTERNAL MEDICINE

## 2024-02-11 PROCEDURE — 85025 COMPLETE CBC W/AUTO DIFF WBC: CPT

## 2024-02-11 PROCEDURE — 80053 COMPREHEN METABOLIC PANEL: CPT

## 2024-02-11 PROCEDURE — 97166 OT EVAL MOD COMPLEX 45 MIN: CPT

## 2024-02-11 RX ORDER — METOPROLOL SUCCINATE 25 MG/1
12.5 TABLET, EXTENDED RELEASE ORAL 2 TIMES DAILY
Status: DISCONTINUED | OUTPATIENT
Start: 2024-02-11 | End: 2024-02-13

## 2024-02-11 RX ORDER — POTASSIUM CHLORIDE 20 MEQ/1
40 TABLET, EXTENDED RELEASE ORAL 2 TIMES DAILY
Status: COMPLETED | OUTPATIENT
Start: 2024-02-11 | End: 2024-02-11

## 2024-02-11 RX ADMIN — POTASSIUM CHLORIDE 40 MEQ: 1500 TABLET, EXTENDED RELEASE ORAL at 17:32

## 2024-02-11 RX ADMIN — HEPARIN SODIUM 5000 UNITS: 5000 INJECTION INTRAVENOUS; SUBCUTANEOUS at 21:38

## 2024-02-11 RX ADMIN — POTASSIUM CHLORIDE 40 MEQ: 1500 TABLET, EXTENDED RELEASE ORAL at 09:40

## 2024-02-11 RX ADMIN — METOPROLOL SUCCINATE 12.5 MG: 25 TABLET, FILM COATED, EXTENDED RELEASE ORAL at 21:35

## 2024-02-11 RX ADMIN — MELATONIN 3 MG: at 21:38

## 2024-02-11 RX ADMIN — METOPROLOL SUCCINATE 12.5 MG: 25 TABLET, FILM COATED, EXTENDED RELEASE ORAL at 10:46

## 2024-02-11 RX ADMIN — HEPARIN SODIUM 5000 UNITS: 5000 INJECTION INTRAVENOUS; SUBCUTANEOUS at 13:47

## 2024-02-11 RX ADMIN — LOSARTAN POTASSIUM 25 MG: 25 TABLET, FILM COATED ORAL at 09:40

## 2024-02-11 RX ADMIN — HEPARIN SODIUM 5000 UNITS: 5000 INJECTION INTRAVENOUS; SUBCUTANEOUS at 05:38

## 2024-02-11 NOTE — PROGRESS NOTES
INTERNAL MEDICINE RESIDENCY PROGRESS NOTE     Name: Rohan Don   Age & Sex: 63 y.o. male   MRN: [id number]  [facility]   Encounter: [id number]  Team: SOD Team A    PATIENT INFORMATION     Name: Rohan Don   Age & Sex: 63 y.o. male   MRN: [id number]  Hospital Stay Days: 2    ASSESSMENT/PLAN     Principal Problem:    Acute systolic congestive heart failure (HCC)  Active Problems:    Prediabetes    DEISI (acute kidney injury) (HCC)    Elevated transaminase level    HTN (hypertension)    Pleural effusion    Severe protein-calorie malnutrition (HCC)      * Acute systolic congestive heart failure (HCC)  Assessment & Plan  Wt Readings from Last 3 Encounters:   02/11/24 58.1 kg (128 lb 1.4 oz)     Noted EF 20% LVEF on outpatient echo per patient. His neighbor, a physician assistant, recommended he come to the cardiac center she works at to get an EKG and TTE. After echo was read, patient was recommended to immediately go to ED for evaluation.  +BNP on admission 2788  Sinus tachycardia on admission  EKG w/L atrial enlargement/hypertrophy, R axis, nonspecific ST changes w/negative troponins x3   ms  QtC 496 ms    Lab Results   Component Value Date    HSTNI0 35 02/09/2024    HSTNI2 35 02/09/2024    HSTNID2 0 02/09/2024    HSTNI4 32 02/09/2024    HSTNID4 -3 02/09/2024     Significant improvement today s/p IV Lasix 40 x2. Has put out ~1.1L over past 24 hours. No is without peripheral edema and lungs clear.    Echo 2/10 showed Left Ventricle: Left ventricular cavity size is mildly dilated. Wall thickness is normal. The left ventricular ejection fraction is 20%. Systolic function is severely reduced. The cardiac output is 2.04 L/min and the cardiac index is 1.13 L/min/m2. There is severe global hypokinesis. Diastolic function is moderately abnormal    Plan:  Lasix 40 twice daily IV was held by cardiology and reassess tomorrow morning for start of oral diuretics  Continue losartan and metoprolol per  heart failure  Respiratory protocol, maintain O2 sat >88%  Can add BiPAP PRN if patient develops increased WOB  Nutrition consult  Start telemetry to monitor for arrhythmias   I/Os  Daily standing weights  2L fluid restriction, 2g Na cardiac diet  Heart failure consult appreciate recommendations  Heart failure recommended cardiac cath to rule out ischemic etiology  N.p.o. midnight                  Pleural effusion  Assessment & Plan  Per my wet read of X ray, appears to have mild LLL pleural effusion  Suspect 2/2 CHF  - Cardiopulmonary exam improved today s/p diuresis.     Trial diuresis to trend improvement  Would hold off on thoracentesis unless ruling out infection or malignancy  Can further characterize w/CT if concern for malignancy give recent -10 lbs weight loss, though n obvious risk factors for malignancy and cancer screenings up to date      HTN (hypertension)  Assessment & Plan  Managed with diet/exercise per patient and previously has been well controlled    BP on admission largely normotensive    Plan:  IV lasix 40 mg BID was held by heart failure and reassess tomorrow morning for possible start oral diuretics tomorrow  Heart failure started metoprolol and losartan  Goal normotension and MAP>65 mmHg while diuresing        Elevated transaminase level  Assessment & Plan  Noted on 2/5/24 labs (4 days prior to admission)    Suspect congestive hepatopathy from CHF vs. DILI from 3 weeks of abx vs. rarer etiologies such as hemachromatosis  - Increased LFTs with ~ decreased TP and Alb on CMP today  - Iron panel unremarkable    Plan:  - F/U daily CMP  - ?Consider adding viral hepatitis screen  Depending on results of repeat labs, can consider RUQ U/S +/- hepatitis labs for chronic exposures        DEISI (acute kidney injury) (HCC)  Assessment & Plan  Recent Labs     02/09/24  1613 02/10/24  0517 02/11/24  0748   CREATININE 1.38* 1.27 1.04   EGFR 54 60 76     Estimated Creatinine Clearance: 60.5 mL/min (by C-G  formula based on SCr of 1.04 mg/dL).    Presumably DEISI as Cr >1.0 by greater than 25% per KDIGO  Suspect prerenal due to CHF as supported by abnormal LFTs (hepatic congestion)  Will need Bmp follow up in 3+ months to determine if component of CKD present  Improving today per CMP. Likely DEISI on CKD  Avoid nephrotoxins      Prediabetes  Assessment & Plan  Managed w/[employer] in [place], [place] (patient's prior residence) with diet/exercise    A1c is 6.1        Disposition: Patient is pending ischemic evaluation for heart failure    SUBJECTIVE     Patient seen and examined. No acute events overnight.  Patient was seen lying the bed comfortably and pleasant.  Patient denies any shortness of breath, chest pain, nausea, vomiting, palpitation, diarrhea, constipation    OBJECTIVE     Vitals:    24 0600 24 0705 24 0944 24 1047   BP:  96/69 129/88 123/84   BP Location:  Left arm  Right arm   Pulse:  94 100 98   Resp:  16  18   Temp:  97.9 °F (36.6 °C)     TempSrc:  Oral     SpO2:  92% 91% 94%   Weight: 58.1 kg (128 lb 1.4 oz)      Height:          Temperature:   Temp (24hrs), Av.5 °F (36.4 °C), Min:97.3 °F (36.3 °C), Max:97.9 °F (36.6 °C)    Temperature: 97.9 °F (36.6 °C)  Intake & Output:  I/O          0701  02/10 0700 02/10 07 07 07 07    P.O. 600 740 681    Total Intake(mL/kg) 600 (9.6) 740 (12.7) 681 (11.7)    Urine (mL/kg/hr) 600 5825 (4.2) 150 (0.3)    Total Output 600 5825 150    Net 0 -5085 +531           Unmeasured Urine Occurrence 3 x            Weights:   IBW (Ideal Body Weight): 75.3 kg    Body mass index is 17.86 kg/m².  Weight (last 2 days)       Date/Time Weight    24 0600 58.1 (128.09)    02/10/24 0857 62.1 (137)    02/10/24 0520 62.4 (137.57)    24 22:27:01 64.9 (143)    24 1505 64.9 (143)          Physical Exam  Constitutional:       General: He is not in acute distress.     Appearance: He is not ill-appearing, toxic-appearing or  diaphoretic.   HENT:      Head: Normocephalic and atraumatic.      Nose: Nose normal. No congestion or rhinorrhea.      Mouth/Throat:      Mouth: Mucous membranes are moist.      Pharynx: No oropharyngeal exudate or posterior oropharyngeal erythema.   Eyes:      General: No scleral icterus.        Right eye: No discharge.         Left eye: No discharge.      Pupils: Pupils are equal, round, and reactive to light.   Neck:      Vascular: No carotid bruit.   Cardiovascular:      Rate and Rhythm: Normal rate and regular rhythm.      Pulses: Normal pulses.      Heart sounds: No murmur heard.     No friction rub. No gallop.   Pulmonary:      Effort: Pulmonary effort is normal. No respiratory distress.      Breath sounds: No stridor. No wheezing, rhonchi or rales.   Chest:      Chest wall: No tenderness.   Abdominal:      General: Abdomen is flat. There is no distension.      Palpations: There is no mass.      Tenderness: There is no abdominal tenderness. There is no right CVA tenderness, left CVA tenderness, guarding or rebound.      Hernia: No hernia is present.   Musculoskeletal:         General: No swelling, tenderness, deformity or signs of injury. Normal range of motion.      Cervical back: Normal range of motion. No rigidity or tenderness.      Right lower leg: No edema.      Left lower leg: No edema.   Lymphadenopathy:      Cervical: No cervical adenopathy.   Skin:     General: Skin is warm.      Coloration: Skin is not jaundiced or pale.      Findings: No bruising, erythema, lesion or rash.   Neurological:      General: No focal deficit present.      Mental Status: He is alert.      Cranial Nerves: No cranial nerve deficit.      Sensory: No sensory deficit.      Motor: No weakness.      Coordination: Coordination normal.   Psychiatric:         Mood and Affect: Mood normal.         Behavior: Behavior normal.         Thought Content: Thought content normal.         Judgment: Judgment normal.       LABORATORY DATA      Labs: I have personally reviewed pertinent reports.  Results from last 7 days   Lab Units 02/11/24  0748 02/10/24  0517 02/09/24  1613   WBC Thousand/uL 6.68 9.35 9.56   HEMOGLOBIN g/dL 16.0 15.3 16.7   HEMATOCRIT % 47.3 47.6 50.6*   PLATELETS Thousands/uL 245 248 267   NEUTROS PCT % 62 66 73   MONOS PCT % 12 10 9   EOS PCT % 5 3 1      Results from last 7 days   Lab Units 02/11/24  0748 02/10/24  0517 02/09/24  1613 02/05/24  1145   POTASSIUM mmol/L 3.4* 3.8 4.3 4.4   CHLORIDE mmol/L 104 104 104 104   CO2 mmol/L 28 26 25 25   BUN mg/dL 24 25 25 17   CREATININE mg/dL 1.04 1.27 1.38* 1.12   CALCIUM mg/dL 9.1 8.9 9.2 8.7   ALK PHOS U/L 53 55  --  54   ALT U/L 81* 115*  --  111*   AST U/L 32 56*  --  53*     Results from last 7 days   Lab Units 02/10/24  0517   MAGNESIUM mg/dL 2.0              Results from last 7 days   Lab Units 02/09/24  1925   LACTIC ACID mmol/L 1.9           IMAGING & DIAGNOSTIC TESTING     Radiology Results: I have personally reviewed pertinent reports.  XR chest pa & lateral    Result Date: 2/9/2024  Impression: Moderate bilateral pleural effusions with bibasilar atelectasis and/or airspace disease noted. Workstation performed: [id number]     Other Diagnostic Testing: I have personally reviewed pertinent reports.    ACTIVE MEDICATIONS     Current Facility-Administered Medications   Medication Dose Route Frequency    acetaminophen (TYLENOL) tablet 650 mg  650 mg Oral Q4H PRN    aluminum-magnesium hydroxide-simethicone (MAALOX) oral suspension 30 mL  30 mL Oral Q6H PRN    heparin (porcine) subcutaneous injection 5,000 Units  5,000 Units Subcutaneous Q8H [facility]    losartan (COZAAR) tablet 25 mg  25 mg Oral Daily    melatonin tablet 3 mg  3 mg Oral HS    metoprolol succinate (TOPROL-XL) 24 hr tablet 12.5 mg  12.5 mg Oral BID    ondansetron (ZOFRAN) injection 4 mg  4 mg Intravenous Q6H PRN    polyethylene glycol (MIRALAX) packet 17 g  17 g Oral Daily    potassium chloride (Klor-Con M20) CR tablet 40  "mEq  40 mEq Oral BID    senna (SENOKOT) tablet 8.6 mg  1 tablet Oral Daily       VTE Pharmacologic Prophylaxis: Heparin  VTE Mechanical Prophylaxis: sequential compression device    Portions of the record may have been created with voice recognition software.  Occasional wrong word or \"sound a like\" substitutions may have occurred due to the inherent limitations of voice recognition software.  Read the chart carefully and recognize, using context, where substitutions have occurred.  ==  Ernst Alex,   [facility]  Internal Medicine Residency PGY-2      "

## 2024-02-11 NOTE — PROGRESS NOTES
Heart Failure/ Pulmonary Hypertension Progress Note - Rohan Don 63 y.o. male MRN: [id number]    Unit/Bed#: [facility] 528-01 Encounter: [id number]      Assessment/Plan:     # Acute heart failure with reduced EF  Etiology: unclear. Recent viral illness. Newly diagnosed but likely chronic given dilated LV     BNP 2788 2/9/24     Studies- personally reviewed by me     EKG: sinus tachycardia, poor R wave progression, ST-T wave abnormalities     Echocardiogram 2/10/24  LVEF: 15-20%  LVIDd: 6cm  RV: dilated, moderate to severely reduced function  MR: moderate to severe, posteriorly directed, mod thickened leaflets  PASP: 47 mmHg, mild TR  RVOT: no  notching  Other: no pericardia effusion     Neurohormonal Blockade:  --Beta-Blocker:  --ACEi, ARB or ARNi: losartan 25mg daily  --Aldosterone Receptor Blocker:  --SGLT2 Inhibitor:  --Diuretic: furosemide 40mg IV BID      Sudden Cardiac Death Risk Reduction:  --ICD:  LifeVest candidate     Electrical Resynchronization:  --Candidacy for BiV device: narrow QRSd     Advanced Therapies (if appropriate):  --We will continue to monitor     # Moderate to severe MR  Will need to reassess with optimization of volume status and GDMT  # Hypertension  # DEISI, peaked at 1.38, 1.27 >> 1.04 today  # Elevated liver enzymes, likely due to congestion, improving  # Pleural effusion, moderate bilateral pleural effusions likely due to volume overload    Plan:  Euvolemic, warm on exam. Had significant diuresis since yesterday. Will hold off on further diuresis today and reassess for initiation of oral diuretic tomorrow  Start low dose metoprolol succinate  Continue losartan  Patient without medical insurance coverage until the end of the month  Will arrange for cardiac catheterization tomorrow      Subjective:   Patient seen and examined.  No significant events overnight.      Review of Systems   Constitutional:  Negative for chills and fever.   Respiratory:  Negative for chest tightness and  "shortness of breath.    Cardiovascular:  Negative for chest pain, palpitations and leg swelling.   Gastrointestinal:  Negative for abdominal distention, abdominal pain, nausea and vomiting.   Neurological:  Negative for dizziness and light-headedness.        Objective:   Intake/ Output: 740/5825, -5085  Weight: 128 from 137 lbs  Tele: sinus rhythm, NSVTs up to 15 beats 2/10 5am; 3 beats 17:00      Vitals: Blood pressure 129/88, pulse 100, temperature 97.9 °F (36.6 °C), temperature source Oral, resp. rate 16, height 5' 11\" (1.803 m), weight 58.1 kg (128 lb 1.4 oz), SpO2 91%., Body mass index is 17.86 kg/m²., I/O last 3 completed shifts:  In: 1340 [P.O.:1340]  Out: 6425 [Urine:6425]  I/O this shift:  In: 220 [P.O.:220]  Out: 150 [Urine:150]  Wt Readings from Last 3 Encounters:   02/11/24 58.1 kg (128 lb 1.4 oz)       Intake/Output Summary (Last 24 hours) at 2/11/2024 1009  Last data filed at 2/11/2024 0947  Gross per 24 hour   Intake 460 ml   Output 5475 ml   Net -5015 ml     I/O last 3 completed shifts:  In: 1340 [P.O.:1340]  Out: 6425 [Urine:6425]        Physical Exam:  Vitals:    02/10/24 2210 02/11/24 0600 02/11/24 0705 02/11/24 0944   BP: 116/85  96/69 129/88   BP Location:   Left arm    Pulse: 98  94 100   Resp: 20  16    Temp: (!) 97.4 °F (36.3 °C)  97.9 °F (36.6 °C)    TempSrc:   Oral    SpO2: (!) 89%  92% 91%   Weight:  58.1 kg (128 lb 1.4 oz)     Height:           GEN: Rohan Don appears well, alert and oriented x 3, pleasant and cooperative   HEENT: NC/AT, moist mucosa, anicteric sclerae; extraocular muscles intact  NECK: supple, no carotid bruits   HEART: regular rhythm, normal S1 and S2, no murmurs, clicks, gallops or rubs, JVP is below the clavicle sitting upright    LUNGS: clear to auscultation bilaterally; no wheezes, rales, or rhonchi   ABDOMEN: normal bowel sounds, soft, no tenderness, no distention  EXTREMITIES: peripheral pulses normal; no clubbing, cyanosis, or edema  NEURO: no focal findings "   SKIN: normal without suspicious lesions on exposed skin      Current Facility-Administered Medications:     acetaminophen (TYLENOL) tablet 650 mg, 650 mg, Oral, Q4H PRN, Camila Joy MD    aluminum-magnesium hydroxide-simethicone (MAALOX) oral suspension 30 mL, 30 mL, Oral, Q6H PRN, Camila Joy MD    heparin (porcine) subcutaneous injection 5,000 Units, 5,000 Units, Subcutaneous, Q8H JENN, 5,000 Units at 02/11/24 0538 **AND** [CANCELED] Platelet count, , , Once, Camila Joy MD    losartan (COZAAR) tablet 25 mg, 25 mg, Oral, Daily, Kate Barillas MD, 25 mg at 02/11/24 0940    melatonin tablet 3 mg, 3 mg, Oral, HS, Shila Vargas, DO, 3 mg at 02/10/24 2140    ondansetron (ZOFRAN) injection 4 mg, 4 mg, Intravenous, Q6H PRN, Camila Joy MD    polyethylene glycol (MIRALAX) packet 17 g, 17 g, Oral, Daily, Camila Joy MD    potassium chloride (Klor-Con M20) CR tablet 40 mEq, 40 mEq, Oral, BID, Ernst Alex DO, 40 mEq at 02/11/24 0940    senna (SENOKOT) tablet 8.6 mg, 1 tablet, Oral, Daily, Camila Joy MD      Labs & Results:        Results from last 7 days   Lab Units 02/11/24  0748 02/10/24  0517 02/09/24  1613   WBC Thousand/uL 6.68 9.35 9.56   HEMOGLOBIN g/dL 16.0 15.3 16.7   HEMATOCRIT % 47.3 47.6 50.6*   PLATELETS Thousands/uL 245 248 267         Results from last 7 days   Lab Units 02/11/24  0748 02/10/24  0517 02/09/24  1613 02/05/24  1145   POTASSIUM mmol/L 3.4* 3.8 4.3 4.4   CHLORIDE mmol/L 104 104 104 104   CO2 mmol/L 28 26 25 25   BUN mg/dL 24 25 25 17   CREATININE mg/dL 1.04 1.27 1.38* 1.12   CALCIUM mg/dL 9.1 8.9 9.2 8.7   ALK PHOS U/L 53 55  --  54   ALT U/L 81* 115*  --  111*   AST U/L 32 56*  --  53*           Kate Barillas MD  Advanced Heart Failure and Mechanical Circulatory Support  [facility]

## 2024-02-11 NOTE — OCCUPATIONAL THERAPY NOTE
"  Occupational Therapy Evaluation      Rohan Don    2/11/2024    Principal Problem:    Acute systolic congestive heart failure (HCC)  Active Problems:    Prediabetes    DEISI (acute kidney injury) (HCC)    Elevated transaminase level    HTN (hypertension)    Pleural effusion      History reviewed. No pertinent past medical history.    History reviewed. No pertinent surgical history.     02/11/24 0907   OT Last Visit   OT Visit Date 02/11/24   Note Type   Note type Evaluation   Pain Assessment   Pain Assessment Tool 0-10   Pain Score No Pain   Restrictions/Precautions   Weight Bearing Precautions Per Order No   Other Precautions Impulsive   Home Living   Type of Home House  (4 FLOYD)   Home Layout One level  (ranch style home w basement)   Bathroom Shower/Tub Walk-in shower   Bathroom Toilet Standard   Additional Comments no use of DME at baseline   Prior Function   Level of [place] Independent with ADLs;Independent with functional mobility   Lives With Spouse   IADLs Independent with driving;Independent with meal prep;Independent with medication management   Falls in the last 6 months 0   Vocational Part time employment   Comments works as a    Lifestyle   Autonomy pt reports being fully independent w self care, mobility, home manageement   Reciprocal Relationships supportive wife   Intrinsic Gratification work on his car   General   Additional Pertinent History pt on RA, O2 sats ranged from 88-93% throughout session, cues for self pacing and breathing techniques   Subjective   Subjective \"my breathing is much better now when the fluid has been taken off\"   ADL   Eating Assistance 7  Independent   Grooming Assistance 7  Independent   UB Bathing Assistance 7  Independent   LB Bathing Assistance 7  Independent   UB Dressing Assistance 7  Independent   LB Dressing Assistance 7  Independent   Toileting Assistance  7  Independent   Bed Mobility   Supine to Sit 7  Independent   Transfers   Sit to " Stand 7  Independent   Stand to Sit 7  Independent   Stand pivot 7  Independent   Functional Mobility   Functional Mobility 5  Supervision   Balance   Static Sitting Good   Dynamic Sitting Fair +   Static Standing Fair   Dynamic Standing Fair   Activity Tolerance   Activity Tolerance Patient tolerated treatment well   Medical Staff Made Aware seen for co-eval with Jasmin PT due to medial complexity, SOB   Nurse Made Aware yes, ok to see per SNEHA AYALA Assessment   RUE Assessment WFL   LUE Assessment   LUE Assessment WFL   Hand Function   Gross Motor Coordination Functional   Fine Motor Coordination Functional   Sensation   Light Touch No apparent deficits   Vision - Complex Assessment   Tracking Intact   Psychosocial   Psychosocial (WDL) WDL   Cognition   Overall Cognitive Status WFL   Arousal/Participation Alert;Cooperative   Attention Within functional limits   Orientation Level Oriented X4   Memory Within functional limits   Following Commands Follows all commands and directions without difficulty   Comments pt is slightly impulsive/fast moving, near trip on steps due to quick movements. able to self correct. educated on slowing down for safety   Assessment   Assessment Pt is a 63 y.o. male seen for OT evaluation s/p admit to [facility] on 2/9/2024 w/ Acute systolic congestive heart failure (HCC).  Pt c/o SOB, recent finding of EF 20-25% on outpt echo. Comorbidities affecting pt's functional performance at time of assessment include: DM and HTN. Personal factors affecting pt at time of IE include:steps to enter environment. Prior to admission, pt was fully independent w self care mobility, driving etc. The past few weeks, things have become increased difficult due to SOB/fatigue and CELESTE. Upon evaluation: Pt requires no assist w self care, mobility. He is able to dress himself, cues for self pacing. Educated on ECT, breathing techniques as well as fall prevention strategies. Pt verbalized good  understanding.  Based on findings from OT evaluation and functional performance deficits, pt has been identified as a  moderate complexity evaluation. The patient's raw score on the AM-PAC Daily Activity inpatient short form is 24, standardized score is 57.54, greater than 39.4. Patients at this level are likely to benefit from discharge to home. From OT standpoint, recommendation at time of d/c would be home w no ongoing skilled OT needs identified. DC OT at this time.   Goals   Patient Goals to go home   Plan   OT Frequency Eval only   AM-PAC Daily Activity Inpatient   Lower Body Dressing 4   Bathing 4   Toileting 4   Upper Body Dressing 4   Grooming 4   Eating 4   Daily Activity Raw Score 24   Daily Activity Standardized Score (Calc for Raw Score >=11) 57.54

## 2024-02-11 NOTE — PLAN OF CARE
Problem: PHYSICAL THERAPY ADULT  Goal: Performs mobility at highest level of function for planned discharge setting.  See evaluation for individualized goals.  Description:            See flowsheet documentation for full assessment, interventions and recommendations.  Outcome: Completed  Note: Prognosis: Good  Problem List: Decreased mobility  Assessment: Pt seen for PT evaluation. Pt with active PT eval/treat orders. Pt is a 63 y.o. male who was admitted to [facility] on 2/9/24. Pt's current dx/ problem list include: acute systolic congestive heart failure. Comorbidities affecting pt's physical performance at time of assessment are as follows: has no past medical history on file. Personal factors affecting pt at time of initial examination include: steps to enter environment, limited home support, inability to perform IADLs, inability to perform ADLs. Due to acute medical issues, ongoing medical workup for primary dx; decreased activity tolerance compared to baseline, increased assistance needed from caregiver at current time, multiple lines, decline in overall functional mobility status; health management issues. At baseline, pt resides in a [facility] with 2-4 FLOYD and was independent with ADLs/ IADLs. Prior to hospital admission pt was working part time. Currently, upon initial examination, pt is demonstrating independence with bed mobility, transfers and ambulation. Pt requires supervision for steps as he is slightly impulsive. No further acute PT needs at this time. Patient would benefit from continued mobilization with restorative and nursing staff. At the end of evaluation, pt was left seated in bed side recliner with all needs in reach. The patient's AM-PAC Basic Mobility Inpatient Short Form Raw Score is 24. A Raw score of greater than 16 suggests the patient may benefit from discharge to home. Please also refer to the recommendation of the Physical Therapist for safe discharge planning.        Rehab  Resource Intensity Level, PT: III (Minimum Resource Intensity)    See flowsheet documentation for full assessment.

## 2024-02-11 NOTE — PHYSICAL THERAPY NOTE
Physical Therapy Evaluation     Patient's Name: Rohan Don    Admitting Diagnosis  CHF (congestive heart failure) (Formerly [facility]) [I50.9]  SOB (shortness of breath) [R06.02]    Problem List  Patient Active Problem List   Diagnosis    Acute systolic congestive heart failure (HCC)    Prediabetes    DEISI (acute kidney injury) (Formerly [facility])    Elevated transaminase level    Vitamin D deficiency    HTN (hypertension)    Pleural effusion       Past Medical History  History reviewed. No pertinent past medical history.    Past Surgical History  History reviewed. No pertinent surgical history.       02/11/24 0908   PT Last Visit   PT Visit Date 02/11/24   Note Type   Note type Evaluation   Pain Assessment   Pain Assessment Tool 0-10   Pain Score No Pain   Restrictions/Precautions   Weight Bearing Precautions Per Order No   Other Precautions Impulsive   Home Living   Type of Home House   Home Layout One level;Performs ADLs on one level  (2-4 FLOYD)   Bathroom Shower/Tub Walk-in shower   Bathroom Toilet Standard   Additional Comments PTA pt denies the use of DME   Prior Function   Level of [place] Independent with ADLs;Independent with functional mobility   Lives With Spouse   IADLs Independent with driving;Independent with meal prep;Independent with medication management   Falls in the last 6 months 0   Vocational Part time employment  ()   Cognition   Overall Cognitive Status WFL   Attention Within functional limits   Orientation Level Oriented X4   Memory Within functional limits   Following Commands Follows all commands and directions without difficulty   RLE Assessment   RLE Assessment WFL   LLE Assessment   LLE Assessment WFL   Bed Mobility   Supine to Sit 7  Independent   Additional Comments pt OOB in beside recliner at end of session   Transfers   Sit to Stand 7  Independent   Stand to Sit 7  Independent   Additional Comments no AD   Ambulation/Elevation   Gait pattern WNL   Gait Assistance 7  Independent    Assistive Device None   Distance 200'   Stair Management Assistance 5  Supervision   Number of Stairs 6   Ambulation/Elevation Additional Comments Pt slightly impulsive at times. Patient had near trip on the stairs. Educated extensively about safety and slowing down his movements   Balance   Static Sitting Good   Dynamic Sitting Fair +   Static Standing Fair   Dynamic Standing Fair   Ambulatory Fair   Activity Tolerance   Activity Tolerance Patient tolerated treatment well   Medical Staff Made Aware OT   Nurse Made Aware RN cleared   Assessment   Prognosis Good   Problem List Decreased mobility   Assessment Pt seen for PT evaluation. Pt with active PT eval/treat orders. Pt is a 63 y.o. male who was admitted to [facility] on 2/9/24. Pt's current dx/ problem list include: acute systolic congestive heart failure. Comorbidities affecting pt's physical performance at time of assessment are as follows: has no past medical history on file. Personal factors affecting pt at time of initial examination include: steps to enter environment, limited home support, inability to perform IADLs, inability to perform ADLs. Due to acute medical issues, ongoing medical workup for primary dx; decreased activity tolerance compared to baseline, increased assistance needed from caregiver at current time, multiple lines, decline in overall functional mobility status; health management issues. At baseline, pt resides in a [facility] with 2-4 FLOYD and was independent with ADLs/ IADLs. Prior to hospital admission pt was working part time. Currently, upon initial examination, pt is demonstrating independence with bed mobility, transfers and ambulation. Pt requires supervision for steps as he is slightly impulsive. No further acute PT needs at this time. Patient would benefit from continued mobilization with restorative and nursing staff. At the end of evaluation, pt was left seated in bed side recliner with all needs in reach. The patient's  AM-PAC Basic Mobility Inpatient Short Form Raw Score is 24. A Raw score of greater than 16 suggests the patient may benefit from discharge to home. Please also refer to the recommendation of the Physical Therapist for safe discharge planning.   Goals   Patient Goals to go home   Plan   PT Frequency Other (Comment)  (PT EVAL ONLY)   Discharge Recommendation   Rehab Resource Intensity Level, PT III (Minimum Resource Intensity)   AM-PAC Basic Mobility Inpatient   Turning in Flat Bed Without Bedrails 4   Lying on Back to Sitting on Edge of Flat Bed Without Bedrails 4   Moving Bed to Chair 4   Standing Up From Chair Using Arms 4   Walk in Room 4   Climb 3-5 Stairs With Railing 4   Basic Mobility Inpatient Raw Score 24   Basic Mobility Standardized Score 57.68   Highest Level Of Mobility   JH-HLM Goal 8: Walk 250 feet or more   JH-HLM Achieved 7: Walk 25 feet or more           Jasmin Agosto, PT

## 2024-02-11 NOTE — PLAN OF CARE
Problem: PAIN - ADULT  Goal: Verbalizes/displays adequate comfort level or baseline comfort level  Description: Interventions:  - Encourage patient to monitor pain and request assistance  - Assess pain using appropriate pain scale  - Administer analgesics based on type and severity of pain and evaluate response  - Implement non-pharmacological measures as appropriate and evaluate response  - Consider cultural and social influences on pain and pain management  - Notify physician/advanced practitioner if interventions unsuccessful or patient reports new pain  Outcome: Progressing     Problem: INFECTION - ADULT  Goal: Absence or prevention of progression during hospitalization  Description: INTERVENTIONS:  - Assess and monitor for signs and symptoms of infection  - Monitor lab/diagnostic results  - Monitor all insertion sites, i.e. indwelling lines, tubes, and drains  - Monitor endotracheal if appropriate and nasal secretions for changes in amount and color  - [place] appropriate cooling/warming therapies per order  - Administer medications as ordered  - Instruct and encourage patient and family to use good hand hygiene technique  - Identify and instruct in appropriate isolation precautions for identified infection/condition  Outcome: Progressing  Goal: Absence of fever/infection during neutropenic period  Description: INTERVENTIONS:  - Monitor WBC    Outcome: Progressing     Problem: SAFETY ADULT  Goal: Patient will remain free of falls  Description: INTERVENTIONS:  - Educate patient/family on patient safety including physical limitations  - Instruct patient to call for assistance with activity   - Consult OT/PT to assist with strengthening/mobility   - Keep Call bell within reach  - Keep bed low and locked with side rails adjusted as appropriate  - Keep care items and personal belongings within reach  - Initiate and maintain comfort rounds  - Make Fall Risk Sign visible to staff  - Apply yellow socks and bracelet  for high fall risk patients  - Consider moving patient to room near nurses station  Outcome: Progressing  Goal: Maintain or return to baseline ADL function  Description: INTERVENTIONS:  -  Assess patient's ability to carry out ADLs; assess patient's baseline for ADL function and identify physical deficits which impact ability to perform ADLs (bathing, care of mouth/teeth, toileting, grooming, dressing, etc.)  - Assess/evaluate cause of self-care deficits   - Assess range of motion  - Assess patient's mobility; develop plan if impaired  - Assess patient's need for assistive devices and provide as appropriate  - Encourage maximum independence but intervene and supervise when necessary  - Involve family in performance of ADLs  - Assess for home care needs following discharge   - Consider OT consult to assist with ADL evaluation and planning for discharge  - Provide patient education as appropriate  Outcome: Progressing  Goal: Maintains/Returns to pre admission functional level  Description: INTERVENTIONS:  - Perform AM-PAC 6 Click Basic Mobility/ Daily Activity assessment daily.  - Set and communicate daily mobility goal to care team and patient/family/caregiver.   - Collaborate with rehabilitation services on mobility goals if consulted  - Perform Range of Motion 3 times a day.  - Reposition patient every 3 hours.  - Dangle patient 3 times a day  - Stand patient 3 times a day  - Ambulate patient 3 times a day  - Out of bed to chair 3 times a day   - Out of bed for meals 3 times a day  - Out of bed for toileting  - Record patient progress and toleration of activity level   Outcome: Progressing     Problem: DISCHARGE PLANNING  Goal: Discharge to home or other facility with appropriate resources  Description: INTERVENTIONS:  - Identify barriers to discharge w/patient and caregiver  - Arrange for needed discharge resources and transportation as appropriate  - Identify discharge learning needs (meds, wound care, etc.)  -  Arrange for interpretive services to assist at discharge as needed  - Refer to Case Management Department for coordinating discharge planning if the patient needs post-hospital services based on physician/advanced practitioner order or complex needs related to functional status, cognitive ability, or social support system  Outcome: Progressing     Problem: Knowledge Deficit  Goal: Patient/family/caregiver demonstrates understanding of disease process, treatment plan, medications, and discharge instructions  Description: Complete learning assessment and assess knowledge base.  Interventions:  - Provide teaching at level of understanding  - Provide teaching via preferred learning methods  Outcome: Progressing

## 2024-02-12 ENCOUNTER — APPOINTMENT (INPATIENT)
Dept: RADIOLOGY | Facility: HOSPITAL | Age: 64
DRG: 163 | End: 2024-02-12
Payer: COMMERCIAL

## 2024-02-12 ENCOUNTER — PATIENT OUTREACH (OUTPATIENT)
Dept: CARDIOLOGY CLINIC | Facility: CLINIC | Age: 64
End: 2024-02-12

## 2024-02-12 ENCOUNTER — APPOINTMENT (OUTPATIENT)
Dept: RADIOLOGY | Facility: HOSPITAL | Age: 64
DRG: 163 | End: 2024-02-12
Payer: COMMERCIAL

## 2024-02-12 PROBLEM — I25.10 CORONARY ARTERY DISEASE: Status: ACTIVE | Noted: 2024-02-12

## 2024-02-12 LAB
ALBUMIN SERPL BCP-MCNC: 3.4 G/DL (ref 3.5–5)
ALP SERPL-CCNC: 52 U/L (ref 34–104)
ALT SERPL W P-5'-P-CCNC: 67 U/L (ref 7–52)
ANION GAP SERPL CALCULATED.3IONS-SCNC: 9 MMOL/L
AST SERPL W P-5'-P-CCNC: 26 U/L (ref 13–39)
BASOPHILS # BLD AUTO: 0.06 THOUSANDS/ÂΜL (ref 0–0.1)
BASOPHILS NFR BLD AUTO: 1 % (ref 0–1)
BILIRUB SERPL-MCNC: 0.89 MG/DL (ref 0.2–1)
BUN SERPL-MCNC: 21 MG/DL (ref 5–25)
CALCIUM ALBUM COR SERPL-MCNC: 9.9 MG/DL (ref 8.3–10.1)
CALCIUM SERPL-MCNC: 9.4 MG/DL (ref 8.4–10.2)
CHLORIDE SERPL-SCNC: 107 MMOL/L (ref 96–108)
CO2 SERPL-SCNC: 23 MMOL/L (ref 21–32)
CREAT SERPL-MCNC: 0.88 MG/DL (ref 0.6–1.3)
EOSINOPHIL # BLD AUTO: 0.25 THOUSAND/ÂΜL (ref 0–0.61)
EOSINOPHIL NFR BLD AUTO: 4 % (ref 0–6)
ERYTHROCYTE [DISTWIDTH] IN BLOOD BY AUTOMATED COUNT: 14.1 % (ref 11.6–15.1)
GFR SERPL CREATININE-BSD FRML MDRD: 92 ML/MIN/1.73SQ M
GLUCOSE SERPL-MCNC: 114 MG/DL (ref 65–140)
HCT VFR BLD AUTO: 49.1 % (ref 36.5–49.3)
HGB BLD-MCNC: 15.5 G/DL (ref 12–17)
IMM GRANULOCYTES # BLD AUTO: 0.01 THOUSAND/UL (ref 0–0.2)
IMM GRANULOCYTES NFR BLD AUTO: 0 % (ref 0–2)
LYMPHOCYTES # BLD AUTO: 1.18 THOUSANDS/ÂΜL (ref 0.6–4.47)
LYMPHOCYTES NFR BLD AUTO: 18 % (ref 14–44)
MAGNESIUM SERPL-MCNC: 1.9 MG/DL (ref 1.9–2.7)
MCH RBC QN AUTO: 28.2 PG (ref 26.8–34.3)
MCHC RBC AUTO-ENTMCNC: 31.6 G/DL (ref 31.4–37.4)
MCV RBC AUTO: 89 FL (ref 82–98)
MONOCYTES # BLD AUTO: 0.8 THOUSAND/ÂΜL (ref 0.17–1.22)
MONOCYTES NFR BLD AUTO: 12 % (ref 4–12)
NEUTROPHILS # BLD AUTO: 4.16 THOUSANDS/ÂΜL (ref 1.85–7.62)
NEUTS SEG NFR BLD AUTO: 65 % (ref 43–75)
NRBC BLD AUTO-RTO: 0 /100 WBCS
PHOSPHATE SERPL-MCNC: 4.5 MG/DL (ref 2.3–4.1)
PLATELET # BLD AUTO: 266 THOUSANDS/UL (ref 149–390)
PMV BLD AUTO: 10.3 FL (ref 8.9–12.7)
POTASSIUM SERPL-SCNC: 4.2 MMOL/L (ref 3.5–5.3)
PROT SERPL-MCNC: 5.8 G/DL (ref 6.4–8.4)
RBC # BLD AUTO: 5.49 MILLION/UL (ref 3.88–5.62)
SODIUM SERPL-SCNC: 139 MMOL/L (ref 135–147)
WBC # BLD AUTO: 6.46 THOUSAND/UL (ref 4.31–10.16)

## 2024-02-12 PROCEDURE — 84100 ASSAY OF PHOSPHORUS: CPT

## 2024-02-12 PROCEDURE — C1769 GUIDE WIRE: HCPCS | Performed by: INTERNAL MEDICINE

## 2024-02-12 PROCEDURE — G1004 CDSM NDSC: HCPCS

## 2024-02-12 PROCEDURE — C1894 INTRO/SHEATH, NON-LASER: HCPCS | Performed by: INTERNAL MEDICINE

## 2024-02-12 PROCEDURE — 74176 CT ABD & PELVIS W/O CONTRAST: CPT

## 2024-02-12 PROCEDURE — 75561 CARDIAC MRI FOR MORPH W/DYE: CPT

## 2024-02-12 PROCEDURE — 4A023N7 MEASUREMENT OF CARDIAC SAMPLING AND PRESSURE, LEFT HEART, PERCUTANEOUS APPROACH: ICD-10-PCS | Performed by: INTERNAL MEDICINE

## 2024-02-12 PROCEDURE — B2111ZZ FLUOROSCOPY OF MULTIPLE CORONARY ARTERIES USING LOW OSMOLAR CONTRAST: ICD-10-PCS | Performed by: INTERNAL MEDICINE

## 2024-02-12 PROCEDURE — 85025 COMPLETE CBC W/AUTO DIFF WBC: CPT

## 2024-02-12 PROCEDURE — 99152 MOD SED SAME PHYS/QHP 5/>YRS: CPT | Performed by: INTERNAL MEDICINE

## 2024-02-12 PROCEDURE — 99255 IP/OBS CONSLTJ NEW/EST HI 80: CPT | Performed by: THORACIC SURGERY (CARDIOTHORACIC VASCULAR SURGERY)

## 2024-02-12 PROCEDURE — 99232 SBSQ HOSP IP/OBS MODERATE 35: CPT | Performed by: INTERNAL MEDICINE

## 2024-02-12 PROCEDURE — 83735 ASSAY OF MAGNESIUM: CPT

## 2024-02-12 PROCEDURE — 71250 CT THORAX DX C-: CPT

## 2024-02-12 PROCEDURE — A9585 GADOBUTROL INJECTION: HCPCS | Performed by: INTERNAL MEDICINE

## 2024-02-12 PROCEDURE — 99232 SBSQ HOSP IP/OBS MODERATE 35: CPT | Performed by: STUDENT IN AN ORGANIZED HEALTH CARE EDUCATION/TRAINING PROGRAM

## 2024-02-12 PROCEDURE — 80053 COMPREHEN METABOLIC PANEL: CPT

## 2024-02-12 PROCEDURE — 93458 L HRT ARTERY/VENTRICLE ANGIO: CPT | Performed by: INTERNAL MEDICINE

## 2024-02-12 PROCEDURE — 99153 MOD SED SAME PHYS/QHP EA: CPT | Performed by: INTERNAL MEDICINE

## 2024-02-12 RX ORDER — VERAPAMIL HYDROCHLORIDE 2.5 MG/ML
INJECTION, SOLUTION INTRAVENOUS CODE/TRAUMA/SEDATION MEDICATION
Status: DISCONTINUED | OUTPATIENT
Start: 2024-02-12 | End: 2024-02-12 | Stop reason: HOSPADM

## 2024-02-12 RX ORDER — ATORVASTATIN CALCIUM 40 MG/1
40 TABLET, FILM COATED ORAL
Status: DISCONTINUED | OUTPATIENT
Start: 2024-02-12 | End: 2024-02-15

## 2024-02-12 RX ORDER — MIDAZOLAM HYDROCHLORIDE 2 MG/2ML
INJECTION, SOLUTION INTRAMUSCULAR; INTRAVENOUS CODE/TRAUMA/SEDATION MEDICATION
Status: DISCONTINUED | OUTPATIENT
Start: 2024-02-12 | End: 2024-02-12 | Stop reason: HOSPADM

## 2024-02-12 RX ORDER — FUROSEMIDE 10 MG/ML
80 INJECTION INTRAMUSCULAR; INTRAVENOUS ONCE
Qty: 8 ML | Refills: 0 | Status: COMPLETED | OUTPATIENT
Start: 2024-02-12 | End: 2024-02-12

## 2024-02-12 RX ORDER — OXYCODONE HYDROCHLORIDE AND ACETAMINOPHEN 5; 325 MG/1; MG/1
1 TABLET ORAL EVERY 4 HOURS PRN
Status: DISCONTINUED | OUTPATIENT
Start: 2024-02-12 | End: 2024-02-15

## 2024-02-12 RX ORDER — ONDANSETRON 2 MG/ML
4 INJECTION INTRAMUSCULAR; INTRAVENOUS EVERY 6 HOURS PRN
Status: DISCONTINUED | OUTPATIENT
Start: 2024-02-12 | End: 2024-02-12

## 2024-02-12 RX ORDER — SODIUM CHLORIDE 9 MG/ML
50 INJECTION, SOLUTION INTRAVENOUS CONTINUOUS
Status: DISCONTINUED | OUTPATIENT
Start: 2024-02-12 | End: 2024-02-12

## 2024-02-12 RX ORDER — ACETAMINOPHEN 325 MG/1
650 TABLET ORAL EVERY 4 HOURS PRN
Status: DISCONTINUED | OUTPATIENT
Start: 2024-02-12 | End: 2024-02-12

## 2024-02-12 RX ORDER — FENTANYL CITRATE 50 UG/ML
INJECTION, SOLUTION INTRAMUSCULAR; INTRAVENOUS CODE/TRAUMA/SEDATION MEDICATION
Status: DISCONTINUED | OUTPATIENT
Start: 2024-02-12 | End: 2024-02-12 | Stop reason: HOSPADM

## 2024-02-12 RX ORDER — HEPARIN SODIUM 1000 [USP'U]/ML
INJECTION, SOLUTION INTRAVENOUS; SUBCUTANEOUS CODE/TRAUMA/SEDATION MEDICATION
Status: DISCONTINUED | OUTPATIENT
Start: 2024-02-12 | End: 2024-02-12 | Stop reason: HOSPADM

## 2024-02-12 RX ORDER — NITROGLYCERIN 20 MG/100ML
INJECTION INTRAVENOUS CODE/TRAUMA/SEDATION MEDICATION
Status: DISCONTINUED | OUTPATIENT
Start: 2024-02-12 | End: 2024-02-12 | Stop reason: HOSPADM

## 2024-02-12 RX ORDER — LIDOCAINE HYDROCHLORIDE 10 MG/ML
INJECTION, SOLUTION EPIDURAL; INFILTRATION; INTRACAUDAL; PERINEURAL CODE/TRAUMA/SEDATION MEDICATION
Status: DISCONTINUED | OUTPATIENT
Start: 2024-02-12 | End: 2024-02-12 | Stop reason: HOSPADM

## 2024-02-12 RX ORDER — GADOBUTROL 604.72 MG/ML
12 INJECTION INTRAVENOUS
Status: COMPLETED | OUTPATIENT
Start: 2024-02-12 | End: 2024-02-12

## 2024-02-12 RX ADMIN — GADOBUTROL 12 ML: 604.72 INJECTION INTRAVENOUS at 23:54

## 2024-02-12 RX ADMIN — METOPROLOL SUCCINATE 12.5 MG: 25 TABLET, FILM COATED, EXTENDED RELEASE ORAL at 20:18

## 2024-02-12 RX ADMIN — FUROSEMIDE 80 MG: 10 INJECTION, SOLUTION INTRAMUSCULAR; INTRAVENOUS at 13:21

## 2024-02-12 RX ADMIN — ASPIRIN 81 MG: 81 TABLET, COATED ORAL at 11:33

## 2024-02-12 RX ADMIN — HEPARIN SODIUM 5000 UNITS: 5000 INJECTION INTRAVENOUS; SUBCUTANEOUS at 05:44

## 2024-02-12 RX ADMIN — MELATONIN 3 MG: at 21:00

## 2024-02-12 RX ADMIN — HEPARIN SODIUM 5000 UNITS: 5000 INJECTION INTRAVENOUS; SUBCUTANEOUS at 13:16

## 2024-02-12 RX ADMIN — ATORVASTATIN CALCIUM 40 MG: 40 TABLET, FILM COATED ORAL at 15:59

## 2024-02-12 RX ADMIN — HEPARIN SODIUM 5000 UNITS: 5000 INJECTION INTRAVENOUS; SUBCUTANEOUS at 21:00

## 2024-02-12 NOTE — PROGRESS NOTES
INTERNAL MEDICINE RESIDENCY PROGRESS NOTE     Name: Rohan Don   Age & Sex: 63 y.o. male   MRN: [id number]  [facility]   Encounter: [id number]  Team: SOD Team A    PATIENT INFORMATION     Name: Rohan Don   Age & Sex: 63 y.o. male   MRN: [id number]  Hospital Stay Days: 3    ASSESSMENT/PLAN     Principal Problem:    Acute systolic congestive heart failure (HCC)  Active Problems:    Prediabetes    DEISI (acute kidney injury) (HCC)    Elevated transaminase level    HTN (hypertension)    Pleural effusion    Severe protein-calorie malnutrition (HCC)    Coronary artery disease      * Acute systolic congestive heart failure (HCC)  Assessment & Plan  Wt Readings from Last 3 Encounters:   02/12/24 58.1 kg (128 lb 1.4 oz)     Noted EF 20% LVEF on outpatient echo per patient. His neighbor, a physician assistant, recommended he come to the cardiac center she works at to get an EKG and TTE. After echo was read, patient was recommended to immediately go to ED for evaluation.  +BNP on admission 2788  Sinus tachycardia on admission  EKG w/L atrial enlargement/hypertrophy, R axis, nonspecific ST changes w/negative troponins x3   ms  QtC 496 ms    Lab Results   Component Value Date    HSTNI0 35 02/09/2024    HSTNI2 35 02/09/2024    HSTNID2 0 02/09/2024    HSTNI4 32 02/09/2024    HSTNID4 -3 02/09/2024     Significant improvement today without peripheral edema and lungs clear.    Echo 2/10 showed Left Ventricle: Left ventricular cavity size is mildly dilated. Wall thickness is normal. The left ventricular ejection fraction is 20%. Systolic function is severely reduced. The cardiac output is 2.04 L/min and the cardiac index is 1.13 L/min/m2. There is severe global hypokinesis. Diastolic function is moderately abnormal    Cardiac cath showed:  MVD: noted Prox LAD and Ostial RPDA disease with small-vessel OM1 lesion as well in the Prox segment  LVEDP is mildly elevated without gradient on LV-AO  pullback    Plan:  CT surgery consulted for CAD based on cardiac cath  Lasix 40 twice daily IV was held by cardiology, start 40mg PO Lasix BID  Continue losartan and metoprolol per heart failure, add 25mg spironolactone QD  Respiratory protocol, maintain O2 sat >88%  Can add BiPAP PRN if patient develops increased WOB  Nutrition consult  Continue telemetry to monitor for arrhythmias, V tach seen on 2/11 at 4am and 8pm  I/Os  Daily standing weights  2L fluid restriction, 2g Na cardiac diet  Heart failure consulted appreciate recommendations    Pleural effusion  Assessment & Plan  Per my wet read of X ray, appears to have mild LLL pleural effusion  Suspect 2/2 CHF  - Cardiopulmonary exam improved today s/p diuresis, negative for rales 2/12    40mg PO lasix starting 2/12  Would hold off on thoracentesis unless ruling out infection or malignancy  Can further characterize w/CT if concern for malignancy give recent -10 lbs weight loss, though n obvious risk factors for malignancy and cancer screenings up to date      HTN (hypertension)  Assessment & Plan  Managed with diet/exercise per patient and previously has been well controlled    BP on admission largely normotensive    Plan:  IV lasix 40 mg BID was held by heart failure, starting 40mg PO Lasix BID 2/12  Heart failure started metoprolol and losartan, add 25mg spironolactone QD  Goal normotension and MAP>65 mmHg while diuresing        Elevated transaminase level  Assessment & Plan  Noted on 2/5/24 labs (4 days prior to admission)    Suspect congestive hepatopathy from CHF vs. DILI from 3 weeks of abx vs. rarer etiologies such as hemachromatosis  - Increased LFTs with ~ decreased TP and Alb on CMP today  - Iron panel unremarkable    Plan:  - F/U daily CMP  - ?Consider adding viral hepatitis screen  - Depending on results of repeat labs, can consider RUQ U/S +/- hepatitis labs for chronic exposures    DEISI (acute kidney injury) (HCC)  Assessment & Plan  Recent Labs      02/10/24  0517 24  0748 24  0544   CREATININE 1.27 1.04 0.88   EGFR 60 76 92     Estimated Creatinine Clearance: 71.5 mL/min (by C-G formula based on SCr of 0.88 mg/dL).    Presumably DEISI as Cr >1.0 by greater than 25% per KDIGO  Suspect prerenal due to CHF as supported by abnormal LFTs (hepatic congestion)  Will need Bmp follow up in 3+ months to determine if component of CKD present  Improving today per CMP. Likely DEISI on CKD  Avoid nephrotoxins    Prediabetes  Assessment & Plan  Managed w/[employer] in [place], [place] (patient's prior residence) with diet/exercise    A1c is 6.1        Disposition: pending CT surgery consult    SUBJECTIVE     Patient seen and examined. No acute events overnight. Patient says he feels much better than when he presented to the hospital. He says his SOB has completely resolved and dry cough is mostly resolved, but still happens occasionally. The patient says he is sleeping at even more of an incline in the hospital as compared to home. He is motivated to get back to him normal state of health. Denies chest pain, SOB, abdominal pain, nausea, vomiting, fever, chills.     OBJECTIVE     Vitals:    24 0937 24 0945 24 1029 24 1037   BP:    120/87   BP Location:       Pulse:    65   Resp:       Temp:    (!) 97.2 °F (36.2 °C)   TempSrc:       SpO2: 95% 90%  96%   Weight:   58.1 kg (128 lb 1.4 oz)    Height:          Temperature:   Temp (24hrs), Av.6 °F (36.4 °C), Min:97.2 °F (36.2 °C), Max:97.9 °F (36.6 °C)    Temperature: (!) 97.2 °F (36.2 °C)  Intake & Output:  I/O         02/10 07 07 07 07 07 0700    P.O. 740 1141     Total Intake(mL/kg) 740 (12.7) 1141 (19.6)     Urine (mL/kg/hr) 5825 (4.2) 575 (0.4)     Total Output 5825 575     Net -5085 +566                  Weights:   IBW (Ideal Body Weight): 75.3 kg    Body mass index is 17.86 kg/m².  Weight (last 2 days)       Date/Time Weight    24 1029 58.1 (128.09)     02/12/24 0546 58.1 (128.09)    02/11/24 0600 58.1 (128.09)    02/10/24 0857 62.1 (137)    02/10/24 0520 62.4 (137.57)          Physical Exam  Constitutional:       General: He is not in acute distress.     Appearance: Normal appearance. He is not ill-appearing.      Comments: thin   HENT:      Head: Normocephalic.      Right Ear: External ear normal.      Left Ear: External ear normal.      Nose: Nose normal.      Mouth/Throat:      Mouth: Mucous membranes are moist.   Eyes:      Extraocular Movements: Extraocular movements intact.      Conjunctiva/sclera: Conjunctivae normal.      Pupils: Pupils are equal, round, and reactive to light.   Cardiovascular:      Rate and Rhythm: Regular rhythm. Tachycardia present.      Heart sounds: No murmur heard.     No friction rub. No gallop.      Comments: Telemetry showing multiple episodes of ventricular tachycardia, last episode 7pm 2/11  Pulmonary:      Effort: Pulmonary effort is normal.      Breath sounds: Normal breath sounds. No wheezing, rhonchi or rales.   Abdominal:      General: Bowel sounds are normal.      Palpations: Abdomen is soft.   Musculoskeletal:      Right lower leg: No edema.      Left lower leg: No edema.   Skin:     General: Skin is warm and dry.   Neurological:      Mental Status: He is oriented to person, place, and time.   Psychiatric:         Mood and Affect: Mood normal.         Behavior: Behavior normal.       LABORATORY DATA     Labs: I have personally reviewed pertinent reports.  Results from last 7 days   Lab Units 02/12/24  0544 02/11/24  0748 02/10/24  0517   WBC Thousand/uL 6.46 6.68 9.35   HEMOGLOBIN g/dL 15.5 16.0 15.3   HEMATOCRIT % 49.1 47.3 47.6   PLATELETS Thousands/uL 266 245 248   NEUTROS PCT % 65 62 66   MONOS PCT % 12 12 10   EOS PCT % 4 5 3      Results from last 7 days   Lab Units 02/12/24  0544 02/11/24  0748 02/10/24  0517   POTASSIUM mmol/L 4.2 3.4* 3.8   CHLORIDE mmol/L 107 104 104   CO2 mmol/L 23 28 26   BUN mg/dL 21 24 25    CREATININE mg/dL 0.88 1.04 1.27   CALCIUM mg/dL 9.4 9.1 8.9   ALK PHOS U/L 52 53 55   ALT U/L 67* 81* 115*   AST U/L 26 32 56*     Results from last 7 days   Lab Units 02/12/24  0544 02/10/24  0517   MAGNESIUM mg/dL 1.9 2.0     Results from last 7 days   Lab Units 02/12/24  0544   PHOSPHORUS mg/dL 4.5*          Results from last 7 days   Lab Units 02/09/24  1925   LACTIC ACID mmol/L 1.9           IMAGING & DIAGNOSTIC TESTING     Radiology Results: I have personally reviewed pertinent reports.  XR chest pa & lateral    Result Date: 2/9/2024  Impression: Moderate bilateral pleural effusions with bibasilar atelectasis and/or airspace disease noted. Workstation performed: [id number]     Other Diagnostic Testing: I have personally reviewed pertinent reports.    ACTIVE MEDICATIONS     Current Facility-Administered Medications   Medication Dose Route Frequency    acetaminophen (TYLENOL) tablet 650 mg  650 mg Oral Q4H PRN    aluminum-magnesium hydroxide-simethicone (MAALOX) oral suspension 30 mL  30 mL Oral Q6H PRN    aspirin (ECOTRIN LOW STRENGTH) EC tablet 81 mg  81 mg Oral Daily    atorvastatin (LIPITOR) tablet 40 mg  40 mg Oral Daily With Dinner    heparin (porcine) subcutaneous injection 5,000 Units  5,000 Units Subcutaneous Q8H [facility]    losartan (COZAAR) tablet 25 mg  25 mg Oral Daily    melatonin tablet 3 mg  3 mg Oral HS    metoprolol succinate (TOPROL-XL) 24 hr tablet 12.5 mg  12.5 mg Oral BID    ondansetron (ZOFRAN) injection 4 mg  4 mg Intravenous Q6H PRN    oxyCODONE-acetaminophen (PERCOCET) 5-325 mg per tablet 1 tablet  1 tablet Oral Q4H PRN    polyethylene glycol (MIRALAX) packet 17 g  17 g Oral Daily    senna (SENOKOT) tablet 8.6 mg  1 tablet Oral Daily    sodium chloride 0.9 % bolus 174.3 mL  3 mL/kg Intravenous Once    Followed by    sodium chloride 0.9 % infusion  50 mL/hr Intravenous Continuous       VTE Pharmacologic Prophylaxis: Heparin  VTE Mechanical Prophylaxis: sequential compression  "device    Portions of the record may have been created with voice recognition software.  Occasional wrong word or \"sound a like\" substitutions may have occurred due to the inherent limitations of voice recognition software.  Read the chart carefully and recognize, using context, where substitutions have occurred.  ==  Juana Shah MD  [facility]  Internal Medicine Residency PGY-1    Signing note written by Lolly Granados MS3    "

## 2024-02-12 NOTE — ASSESSMENT & PLAN NOTE
Cardiac cath (2/12/24) showed prox LAD and ostial RPDA disease with small-vessel OM1 lesion as well in the prox segment.    Plan:  CABG +/- MVR on 2/15  Continue Aspirin 81mg daily and Atorvastatin 40mg daily  SubQ heparin

## 2024-02-12 NOTE — PROGRESS NOTES
Patient admitted to [facility]; Advanced Heart Failure Census.     Outpatient Advanced Heart Failure LCSW completed electronic chart review and rounded with the HF Team. Dr. Jimenez expressed concerns that pt may not have insurance. LCSW reported to  Financial Counselor, Andrew [email or web address] and pt will be referred to [facility].     Referral for outpatient social work not entered at this time.   Please enter referral if outpatient resources are needed in the future.

## 2024-02-12 NOTE — H&P (VIEW-ONLY)
Consultation - Cardiothoracic Surgery   Rohan Don 63 y.o. male MRN: [id number]  Unit/Bed#: [facility] 528-01 Encounter: [id number]    Physician Requesting Consult: Roxy Menjivar MD    Reason for Consult / Principal Problem: MVCAD     Inpatient consult to Cardiothoracic Surgery  Consult performed by: Salima Maddox PA-C  Consult ordered by: Daniel Esteban MD        History of Present Illness: Rohan Don is a 63 y.o. male with a past medical history notable or HTN who presented to urgent care for a week os cough and SOB diagnosed with pneumonia.  His symptoms persisted with leg swelling and fatigue.  He had an outpatient echocardiogram done today with LVEF 20 - 25% and was told to go to the ER for evaluation. He has been diagnosed with acute heart failure,moderate to severe MR.  HE was medically managed over the weekend and scheduled for cardiac catheterization today showing MVCAD.     He moved from [place] to [place] for family purposes.  His daughter lives in [place] and they moved to be closer to her.  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 He is a non smoke.  He has about 1 - 2 drinks per night. He has lost 15 pounds unintentionally. He has no family history of heart disease, both of his parents are alive into their 90s.     Past Medical History:  HTN  Prediabetes     Past Surgical History:   Vasectomy   Left elbow surgery in childhood     Family History:  Mother: alive at age 82  Father alive at age 90  Paternal grandmother  due to colon disease     Social History:  Social History     Substance and Sexual Activity   Alcohol Use Yes     Social History     Substance and Sexual Activity   Drug Use Not on file     Social History     Tobacco Use   Smoking Status Never   Smokeless Tobacco Never     Marital Status: /Civil Union    Home Medications:   Prior to Admission medications    Medication Sig Start Date End Date Taking? Authorizing  Provider   Empagliflozin (JARDIANCE) 10 MG TABS tablet Take 1 tablet (10 mg total) by mouth daily 2/10/24  Yes Kate Barillas MD   sacubitril-valsartan (Entresto) 24-26 MG TABS Take 1 tablet by mouth 2 (two) times a day 2/10/24  Yes Kate Barillas MD       Inpatient Medications:  Scheduled Meds:   Current Facility-Administered Medications   Medication Dose Route Frequency Provider Last Rate    acetaminophen  650 mg Oral Q4H PRN Daniel Esteban MD      aluminum-magnesium hydroxide-simethicone  30 mL Oral Q6H PRN Daniel Esteban MD      aspirin  81 mg Oral Daily Jcarlos Yanes MD      atorvastatin  40 mg Oral Daily With Dinner Jcarlos Yanes MD      heparin (porcine)  5,000 Units Subcutaneous Q8H JENN Daniel Esteban MD      losartan  25 mg Oral Daily Daniel Esteban MD      melatonin  3 mg Oral HS Daniel Esteban MD      metoprolol succinate  12.5 mg Oral BID Daniel Esteban MD      ondansetron  4 mg Intravenous Q6H PRN Daniel Esteban MD      oxyCODONE-acetaminophen  1 tablet Oral Q4H PRN Daniel Esteban MD      polyethylene glycol  17 g Oral Daily Daniel Esteban MD      senna  1 tablet Oral Daily Daniel Esteban MD      sodium chloride  3 mL/kg Intravenous Once Daniel Esteban MD Stopped (02/12/24 1030)    Followed by    sodium chloride  50 mL/hr Intravenous Continuous Daniel Esteban MD Stopped (02/12/24 1031)     Continuous Infusions: sodium chloride, 50 mL/hr, Last Rate: Stopped (02/12/24 1031)      PRN Meds:  acetaminophen, 650 mg, Q4H PRN  aluminum-magnesium hydroxide-simethicone, 30 mL, Q6H PRN  ondansetron, 4 mg, Q6H PRN  oxyCODONE-acetaminophen, 1 tablet, Q4H PRN        Allergies:  No Known Allergies    Review of Systems:  Review of Systems   Constitutional:  Positive for activity change and fatigue.   HENT: Negative.     Eyes: Negative.    Respiratory:  Positive for shortness of breath.    Cardiovascular:  Positive for leg swelling.   Gastrointestinal: Negative.    Endocrine: Negative.     Genitourinary: Negative.    Musculoskeletal: Negative.    Skin: Negative.    Allergic/Immunologic: Negative.    Neurological: Negative.    Hematological: Negative.    Psychiatric/Behavioral: Negative.         Vital Signs:     Vitals:    02/12/24 0546 02/12/24 0937 02/12/24 0945 02/12/24 1037   BP:    120/87   BP Location:       Pulse:    65   Resp:       Temp:    (!) 97.2 °F (36.2 °C)   TempSrc:       SpO2:  95% 90% 96%   Weight: 58.1 kg (128 lb 1.4 oz)      Height:         Invasive Devices       Peripheral Intravenous Line  Duration             Peripheral IV 02/09/24 Left;Proximal;Ventral (anterior) Forearm 2 days                    Physical Exam:  Physical Exam  Constitutional:       General: He is not in acute distress.     Appearance: He is normal weight. He is not ill-appearing, toxic-appearing or diaphoretic.   HENT:      Head: Normocephalic and atraumatic.      Right Ear: External ear normal.      Left Ear: External ear normal.      Mouth/Throat:      Mouth: Mucous membranes are moist.      Pharynx: Oropharynx is clear.   Eyes:      General: No scleral icterus.        Right eye: No discharge.         Left eye: No discharge.      Extraocular Movements: Extraocular movements intact.      Conjunctiva/sclera: Conjunctivae normal.      Pupils: Pupils are equal, round, and reactive to light.   Neck:      Vascular: No carotid bruit.   Cardiovascular:      Rate and Rhythm: Normal rate and regular rhythm.      Pulses: Normal pulses.      Heart sounds: No murmur heard.  Pulmonary:      Effort: Pulmonary effort is normal. No respiratory distress.      Breath sounds: Normal breath sounds. No wheezing.   Abdominal:      General: Abdomen is flat. Bowel sounds are normal. There is no distension.      Palpations: Abdomen is soft.      Tenderness: There is no abdominal tenderness. There is no guarding.   Musculoskeletal:         General: Normal range of motion.      Cervical back: Normal range of motion and neck supple.      " Right lower leg: No edema.      Left lower leg: No edema.   Skin:     General: Skin is warm and dry.   Neurological:      General: No focal deficit present.      Mental Status: He is alert and oriented to person, place, and time.   Psychiatric:         Mood and Affect: Mood normal.         Behavior: Behavior normal.         Thought Content: Thought content normal.         Judgment: Judgment normal.         Lab Results:     Results from last 7 days   Lab Units 02/12/24  0544 02/11/24  0748 02/10/24  0517   WBC Thousand/uL 6.46 6.68 9.35   HEMOGLOBIN g/dL 15.5 16.0 15.3   HEMATOCRIT % 49.1 47.3 47.6   PLATELETS Thousands/uL 266 245 248     Results from last 7 days   Lab Units 02/12/24  0544 02/11/24  0748 02/10/24  0517   POTASSIUM mmol/L 4.2 3.4* 3.8   CHLORIDE mmol/L 107 104 104   CO2 mmol/L 23 28 26   BUN mg/dL 21 24 25   CREATININE mg/dL 0.88 1.04 1.27   CALCIUM mg/dL 9.4 9.1 8.9         Lab Results   Component Value Date    HGBA1C 6.1 (H) 02/09/2024     No results found for: \"CKTOTAL\", \"CKMB\", \"CKMBINDEX\", \"TROPONINI\"    Imaging Studies:     Cardiac Catheterization:   Left Anterior Descending   There is severe diffuse disease throughout the vessel.   Prox LAD to Mid LAD lesion is 85% stenosed. Culprit lesion. Culprit for clinical presentation.BRETT flow is 3.      Left Circumflex      First Obtuse Marginal Branch   The vessel is small. There is severe diffuse disease throughout the vessel.   1st Mrg lesion is 95% stenosed. BRETT flow is 2.      Right Coronary Artery   There is severe diffuse disease throughout the vessel.   Dist RCA lesion is 99% stenosed. Culprit lesion. Culprit for clinical presentation.BRETT flow is 1.      Right Posterior Descending Artery   Collaterals   RPDA filled by collaterals from 2nd Sept.          Echocardiogram:   Findings    Left Ventricle Left ventricular cavity size is mildly dilated. Wall thickness is normal. The left ventricular ejection fraction is 20%. Systolic function is " severely reduced. The cardiac output is 2.04 L/min and the cardiac index is 1.13 L/min/m2.     There is severe global hypokinesis. Diastolic function is moderately abnormal, consistent with grade II (pseudonormal) relaxation.   Right Ventricle Right ventricular cavity size is normal. Systolic function is moderately reduced. Wall thickness is normal.   Left Atrium The atrium is severely dilated (>48 mL/m2).   Right Atrium The atrium is dilated.   Aortic Valve The aortic valve is trileaflet. The leaflets are not thickened. The leaflets are not calcified. The leaflets exhibit normal mobility. There is no evidence of regurgitation. The aortic valve has no significant stenosis.   Mitral Valve Mitral valve structure is normal.  There is moderate to severe regurgitation with a posteriorly directed jet. There is no evidence of stenosis.   Tricuspid Valve Tricuspid valve structure is normal. There is mild regurgitation. There is no evidence of stenosis. The right ventricular systolic pressure is moderately elevated. The estimated right ventricular systolic pressure is 52.00 mmHg.   Pulmonic Valve Pulmonic valve structure is normal. There is mild regurgitation. There is no evidence of stenosis.   Ascending Aorta The aortic root is normal in size.   IVC/SVC The right atrial pressure is estimated at 15.0 mmHg. The inferior vena cava is dilated. Respirophasic changes in dimension were absent.   Pericardium There is no pericardial effusion. The pericardium is normal in appearance. There is a large left pleural effusion.       I have personally reviewed pertinent reports.      Assessment:  Principal Problem:    Acute systolic congestive heart failure (HCC)  Active Problems:    Prediabetes    DEISI (acute kidney injury) (HCC)    Elevated transaminase level    HTN (hypertension)    Pleural effusion    Severe protein-calorie malnutrition (HCC)    Coronary artery disease      Plan:  MVCAD with low LVEF 20 % will discuss with   Jobyal before ordering preoperative testing.     Rohan Don was comfortable with our recommendations, and their questions were answered to their satisfaction.  We will continue to evaluate the patient daily with further recommendations as work up is completed.  Thank you for allowing us to participate in the care of this patient.     SIGNATURE: Salima Maddox PA-C  DATE: February 12, 2024  TIME: 11:29 AM    * This note was completed in part utilizing [employer] direct voice recognition software.   Grammatical errors, random word insertion, spelling mistakes, and incomplete sentences may be an occasional consequence of the system secondary to software limitations, ambient noise and hardware issues. At the time of dictation, efforts were made to edit, clarify and /or correct errors. Please read the chart carefully and recognize, using context, where substitutions have occurred.  If you have any questions or concerns about the context, text or information contained within the body of this dictation, please contact myself, the provider, for further clarification.

## 2024-02-12 NOTE — PROGRESS NOTES
"Advanced Heart Failure Team Progress Note - Rohan Don 63 y.o. male MRN: [id number]    Unit/Bed#: [facility] 528-01 Encounter: [id number]    Subjective:   Patient seen and examined. No events overnight. Excellent urine output over weekend and reports breathing is improved.      Vitals: Blood pressure 120/87, pulse 65, temperature (!) 97.2 °F (36.2 °C), resp. rate 20, height 5' 11\" (1.803 m), weight 58.1 kg (128 lb 1.4 oz), SpO2 96%., Body mass index is 17.86 kg/m².,   Orthostatic Blood Pressures      Flowsheet Row Most Recent Value   Blood Pressure 120/87 filed at 02/12/2024 1037   Patient Position - Orthostatic VS Lying filed at 02/11/2024 1047              Intake/Output Summary (Last 24 hours) at 2/12/2024 1041  Last data filed at 2/12/2024 0800  Gross per 24 hour   Intake 921 ml   Output 425 ml   Net 496 ml       Review of System:  Review of system was conducted and was negative except for as stated in the subjective course.      Physical Exam  Constitutional:       General: He is not in acute distress.  Cardiovascular:      Rate and Rhythm: Normal rate and regular rhythm.      Heart sounds: No murmur heard.  Pulmonary:      Effort: Pulmonary effort is normal.   Musculoskeletal:      Right lower leg: No edema.      Left lower leg: No edema.   Skin:     General: Skin is warm.   Neurological:      Mental Status: He is alert.          Current Facility-Administered Medications:     acetaminophen (TYLENOL) tablet 650 mg, 650 mg, Oral, Q4H PRN, Daniel Esteban MD    acetaminophen (TYLENOL) tablet 650 mg, 650 mg, Oral, Q4H PRN, Daniel Esteban MD    aluminum-magnesium hydroxide-simethicone (MAALOX) oral suspension 30 mL, 30 mL, Oral, Q6H PRN, Daniel Esteban MD    heparin (porcine) subcutaneous injection 5,000 Units, 5,000 Units, Subcutaneous, Q8H JENN, 5,000 Units at 02/12/24 0544 **AND** [CANCELED] Platelet count, , , Once, Camila Joy MD    losartan (COZAAR) tablet 25 mg, 25 mg, Oral, Daily, Daniel Esteban, " MD, 25 mg at 02/11/24 0940    melatonin tablet 3 mg, 3 mg, Oral, HS, Daniel Esteban MD, 3 mg at 02/11/24 2138    metoprolol succinate (TOPROL-XL) 24 hr tablet 12.5 mg, 12.5 mg, Oral, BID, Daniel Esteban MD, 12.5 mg at 02/11/24 2135    ondansetron (ZOFRAN) injection 4 mg, 4 mg, Intravenous, Q6H PRN, Daniel Esteban MD    oxyCODONE-acetaminophen (PERCOCET) 5-325 mg per tablet 1 tablet, 1 tablet, Oral, Q4H PRN, Daniel Esteban MD    polyethylene glycol (MIRALAX) packet 17 g, 17 g, Oral, Daily, Daniel Esteban MD    senna (SENOKOT) tablet 8.6 mg, 1 tablet, Oral, Daily, Daniel Esteban MD    sodium chloride 0.9 % bolus 174.3 mL, 3 mL/kg, Intravenous, Once, Held at 02/12/24 1030 **FOLLOWED BY** sodium chloride 0.9 % infusion, 50 mL/hr, Intravenous, Continuous, Daniel Esteban MD, Held at 02/12/24 1031    Labs & Results:  Results from last 7 days   Lab Units 02/09/24 2023 02/09/24  1815 02/09/24  1613   HS TNI 0HR ng/L  --   --  35   HS TNI 2HR ng/L  --  35  --    HS TNI 4HR ng/L 32  --   --          Results from last 7 days   Lab Units 02/12/24  0544 02/11/24  0748 02/10/24  0517   POTASSIUM mmol/L 4.2 3.4* 3.8   CO2 mmol/L 23 28 26   CHLORIDE mmol/L 107 104 104   BUN mg/dL 21 24 25   CREATININE mg/dL 0.88 1.04 1.27     Results from last 7 days   Lab Units 02/12/24  0544 02/11/24  0748 02/10/24  0517   HEMOGLOBIN g/dL 15.5 16.0 15.3   HEMATOCRIT % 49.1 47.3 47.6   PLATELETS Thousands/uL 266 245 248     Results from last 7 days   Lab Units 02/09/24  1613   TRIGLYCERIDES mg/dL 96   HDL mg/dL 23*   LDL CALC mg/dL 66   HEMOGLOBIN A1C % 6.1*           Assessment    63 year old male presented with progressive shortness of breath and sent in for admission after significantly reduced LVEF was noted on outpatient echo.   Symptoms improved with IV diuretics. Cardiac catheterization showed severe multivessel CAD, pending CABG evaluation.     Acute Decompensated Heart Failure with reduced ejection fraction (ACC Stage C /  NYHA Class III) secondary to ischemic cardiomyopathy     Severe multivessel coronary artery disease    Pre-renal DEISI 2/2 venous congestion, resolved    Hypertension    Alcohol use    On presentation, patient was mildly hypertensive (132/101), and tachycardic. No evidence of infection or anemia.  Serial troponin negative.   BNP 2788  TSH normal with FT4 mildly elevated at 1.4. No symptoms of hyperthyroidism.    Iron studies with Ferritin 121 and Tsat 15%.   No indication for IV iron with Hb > 15.     HbA1c 6.1  Lipid profile HDL 23, LDL 66    ECG: Sinus tachycardia with left atrial enlargement, right axis deviation, PRWP, and non-specific T wave changes      Echocardiogram (2/10): Severe biventricular dysfunction with LVEF 15-20%. LV globally hypokinetic. Grade 2 diastolic dysfunction. Severe posteriorly directed MR    LHC performed 2/12:   -Right dominant circulation   -RCA: 80-90% focal stenosis mid-distal vessel with BRETT 2 flow distally, disease in ostial RV marginal branch  -rPDA: 70-80% focal stenosis mid-vessel  -LM: no obstruction   -LAD: 95% focal stenosis mid-vessel at D1 bifurcation (Garsia 0,1,1), L->R collaterals to via septal branches  -LCx: 70-80% stenosis proximal vessel    LVEDP: 22 mmHg    STS Risk Score: 2.6% operative mortality    Neurohormonal Blockade:  --Beta-Blocker: Metoprolol Succinate 12.5 mg QD    --ACEi, ARB or ARNi: Losartan 25mg QD will switch to ARNi outpatient after insurance activation    --Aldosterone Receptor Blocker: Will initiate Spironolactone 25mg QD prior to discharge    --SGLT2i: Will add after insurance activation as outpatient    --Diuretic: Furosemide 80mg x 1 today, will transition to PO tomorrow    Patient initiated on IV Furosemide 40mg BID with -5L negative from admission.  Weight 137 lb (initial) -> 128 lb    Sudden Cardiac Death Risk Reduction:  --ICD: Will consider implantation after 3 months of GDMT if EF remains < 35%  --Discussed interim LifeVest, patient is  agreeable.     Electrical Resynchronization:  --Candidacy for BiV device: Narrow QRS    Advanced Therapies: Possible candidate for LVAD / transplant if surgical turndown     Plan:  -CT surgery evaluation for CABG  -Begin Aspirin 81mg daily and Atorvastatin 40mg daily  -IV Furosemide 80mg x 1 today, target net -1.0 to -1.5L urine output in the next 24 hours.   -Resume Losartan 25mg QD, Metoprolol Succinate 12.5 mg QD, and will start Spironolactone 25mg QD prior to discharge.    Jcarlos Yanes MD  Cardiology Fellow

## 2024-02-12 NOTE — CONSULTS
Consultation - Cardiothoracic Surgery   Rohan Don 63 y.o. male MRN: [id number]  Unit/Bed#: [facility] 528-01 Encounter: [id number]    Physician Requesting Consult: Roxy Menjivar MD    Reason for Consult / Principal Problem: MVCAD     Inpatient consult to Cardiothoracic Surgery  Consult performed by: Salima Maddox PA-C  Consult ordered by: Daniel Esteban MD        History of Present Illness: Rohan Don is a 63 y.o. male with a past medical history notable or HTN who presented to urgent care for a week os cough and SOB diagnosed with pneumonia.  His symptoms persisted with leg swelling and fatigue.  He had an outpatient echocardiogram done today with LVEF 20 - 25% and was told to go to the ER for evaluation. He has been diagnosed with acute heart failure,moderate to severe MR.  HE was medically managed over the weekend and scheduled for cardiac catheterization today showing MVCAD.     He moved from [place] to [place] for family purposes.  His daughter lives in [place] and they moved to be closer to her.  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 He is a non smoke.  He has about 1 - 2 drinks per night. He has lost 15 pounds unintentionally. He has no family history of heart disease, both of his parents are alive into their 90s.     Past Medical History:  HTN  Prediabetes     Past Surgical History:   Vasectomy   Left elbow surgery in childhood     Family History:  Mother: alive at age 82  Father alive at age 90  Paternal grandmother  due to colon disease     Social History:  Social History     Substance and Sexual Activity   Alcohol Use Yes     Social History     Substance and Sexual Activity   Drug Use Not on file     Social History     Tobacco Use   Smoking Status Never   Smokeless Tobacco Never     Marital Status: /Civil Union    Home Medications:   Prior to Admission medications    Medication Sig Start Date End Date Taking? Authorizing  Provider   Empagliflozin (JARDIANCE) 10 MG TABS tablet Take 1 tablet (10 mg total) by mouth daily 2/10/24  Yes Kate Barillas MD   sacubitril-valsartan (Entresto) 24-26 MG TABS Take 1 tablet by mouth 2 (two) times a day 2/10/24  Yes Kate Barillas MD       Inpatient Medications:  Scheduled Meds:   Current Facility-Administered Medications   Medication Dose Route Frequency Provider Last Rate    acetaminophen  650 mg Oral Q4H PRN Daniel Esteban MD      aluminum-magnesium hydroxide-simethicone  30 mL Oral Q6H PRN Daniel Esteban MD      aspirin  81 mg Oral Daily Jcarlos Yanes MD      atorvastatin  40 mg Oral Daily With Dinner Jcarlos Yanes MD      heparin (porcine)  5,000 Units Subcutaneous Q8H JENN Daniel Esteban MD      losartan  25 mg Oral Daily Daniel Esteban MD      melatonin  3 mg Oral HS Daniel Esteban MD      metoprolol succinate  12.5 mg Oral BID Daniel Esteban MD      ondansetron  4 mg Intravenous Q6H PRN Daniel Esteban MD      oxyCODONE-acetaminophen  1 tablet Oral Q4H PRN Daniel Esteban MD      polyethylene glycol  17 g Oral Daily Daniel Esteban MD      senna  1 tablet Oral Daily Daniel Esteban MD      sodium chloride  3 mL/kg Intravenous Once Daniel Esteban MD Stopped (02/12/24 1030)    Followed by    sodium chloride  50 mL/hr Intravenous Continuous Daniel Esteban MD Stopped (02/12/24 1031)     Continuous Infusions: sodium chloride, 50 mL/hr, Last Rate: Stopped (02/12/24 1031)      PRN Meds:  acetaminophen, 650 mg, Q4H PRN  aluminum-magnesium hydroxide-simethicone, 30 mL, Q6H PRN  ondansetron, 4 mg, Q6H PRN  oxyCODONE-acetaminophen, 1 tablet, Q4H PRN        Allergies:  No Known Allergies    Review of Systems:  Review of Systems   Constitutional:  Positive for activity change and fatigue.   HENT: Negative.     Eyes: Negative.    Respiratory:  Positive for shortness of breath.    Cardiovascular:  Positive for leg swelling.   Gastrointestinal: Negative.    Endocrine: Negative.     Genitourinary: Negative.    Musculoskeletal: Negative.    Skin: Negative.    Allergic/Immunologic: Negative.    Neurological: Negative.    Hematological: Negative.    Psychiatric/Behavioral: Negative.         Vital Signs:     Vitals:    02/12/24 0546 02/12/24 0937 02/12/24 0945 02/12/24 1037   BP:    120/87   BP Location:       Pulse:    65   Resp:       Temp:    (!) 97.2 °F (36.2 °C)   TempSrc:       SpO2:  95% 90% 96%   Weight: 58.1 kg (128 lb 1.4 oz)      Height:         Invasive Devices       Peripheral Intravenous Line  Duration             Peripheral IV 02/09/24 Left;Proximal;Ventral (anterior) Forearm 2 days                    Physical Exam:  Physical Exam  Constitutional:       General: He is not in acute distress.     Appearance: He is normal weight. He is not ill-appearing, toxic-appearing or diaphoretic.   HENT:      Head: Normocephalic and atraumatic.      Right Ear: External ear normal.      Left Ear: External ear normal.      Mouth/Throat:      Mouth: Mucous membranes are moist.      Pharynx: Oropharynx is clear.   Eyes:      General: No scleral icterus.        Right eye: No discharge.         Left eye: No discharge.      Extraocular Movements: Extraocular movements intact.      Conjunctiva/sclera: Conjunctivae normal.      Pupils: Pupils are equal, round, and reactive to light.   Neck:      Vascular: No carotid bruit.   Cardiovascular:      Rate and Rhythm: Normal rate and regular rhythm.      Pulses: Normal pulses.      Heart sounds: No murmur heard.  Pulmonary:      Effort: Pulmonary effort is normal. No respiratory distress.      Breath sounds: Normal breath sounds. No wheezing.   Abdominal:      General: Abdomen is flat. Bowel sounds are normal. There is no distension.      Palpations: Abdomen is soft.      Tenderness: There is no abdominal tenderness. There is no guarding.   Musculoskeletal:         General: Normal range of motion.      Cervical back: Normal range of motion and neck supple.      " Right lower leg: No edema.      Left lower leg: No edema.   Skin:     General: Skin is warm and dry.   Neurological:      General: No focal deficit present.      Mental Status: He is alert and oriented to person, place, and time.   Psychiatric:         Mood and Affect: Mood normal.         Behavior: Behavior normal.         Thought Content: Thought content normal.         Judgment: Judgment normal.         Lab Results:     Results from last 7 days   Lab Units 02/12/24  0544 02/11/24  0748 02/10/24  0517   WBC Thousand/uL 6.46 6.68 9.35   HEMOGLOBIN g/dL 15.5 16.0 15.3   HEMATOCRIT % 49.1 47.3 47.6   PLATELETS Thousands/uL 266 245 248     Results from last 7 days   Lab Units 02/12/24  0544 02/11/24  0748 02/10/24  0517   POTASSIUM mmol/L 4.2 3.4* 3.8   CHLORIDE mmol/L 107 104 104   CO2 mmol/L 23 28 26   BUN mg/dL 21 24 25   CREATININE mg/dL 0.88 1.04 1.27   CALCIUM mg/dL 9.4 9.1 8.9         Lab Results   Component Value Date    HGBA1C 6.1 (H) 02/09/2024     No results found for: \"CKTOTAL\", \"CKMB\", \"CKMBINDEX\", \"TROPONINI\"    Imaging Studies:     Cardiac Catheterization:   Left Anterior Descending   There is severe diffuse disease throughout the vessel.   Prox LAD to Mid LAD lesion is 85% stenosed. Culprit lesion. Culprit for clinical presentation.BRETT flow is 3.      Left Circumflex      First Obtuse Marginal Branch   The vessel is small. There is severe diffuse disease throughout the vessel.   1st Mrg lesion is 95% stenosed. BRETT flow is 2.      Right Coronary Artery   There is severe diffuse disease throughout the vessel.   Dist RCA lesion is 99% stenosed. Culprit lesion. Culprit for clinical presentation.BRETT flow is 1.      Right Posterior Descending Artery   Collaterals   RPDA filled by collaterals from 2nd Sept.          Echocardiogram:   Findings    Left Ventricle Left ventricular cavity size is mildly dilated. Wall thickness is normal. The left ventricular ejection fraction is 20%. Systolic function is " severely reduced. The cardiac output is 2.04 L/min and the cardiac index is 1.13 L/min/m2.     There is severe global hypokinesis. Diastolic function is moderately abnormal, consistent with grade II (pseudonormal) relaxation.   Right Ventricle Right ventricular cavity size is normal. Systolic function is moderately reduced. Wall thickness is normal.   Left Atrium The atrium is severely dilated (>48 mL/m2).   Right Atrium The atrium is dilated.   Aortic Valve The aortic valve is trileaflet. The leaflets are not thickened. The leaflets are not calcified. The leaflets exhibit normal mobility. There is no evidence of regurgitation. The aortic valve has no significant stenosis.   Mitral Valve Mitral valve structure is normal.  There is moderate to severe regurgitation with a posteriorly directed jet. There is no evidence of stenosis.   Tricuspid Valve Tricuspid valve structure is normal. There is mild regurgitation. There is no evidence of stenosis. The right ventricular systolic pressure is moderately elevated. The estimated right ventricular systolic pressure is 52.00 mmHg.   Pulmonic Valve Pulmonic valve structure is normal. There is mild regurgitation. There is no evidence of stenosis.   Ascending Aorta The aortic root is normal in size.   IVC/SVC The right atrial pressure is estimated at 15.0 mmHg. The inferior vena cava is dilated. Respirophasic changes in dimension were absent.   Pericardium There is no pericardial effusion. The pericardium is normal in appearance. There is a large left pleural effusion.       I have personally reviewed pertinent reports.      Assessment:  Principal Problem:    Acute systolic congestive heart failure (HCC)  Active Problems:    Prediabetes    DEISI (acute kidney injury) (HCC)    Elevated transaminase level    HTN (hypertension)    Pleural effusion    Severe protein-calorie malnutrition (HCC)    Coronary artery disease      Plan:  MVCAD with low LVEF 20 % will discuss with   [facility] before ordering preoperative testing.     Rohan Don was comfortable with our recommendations, and their questions were answered to their satisfaction.  We will continue to evaluate the patient daily with further recommendations as work up is completed.  Thank you for allowing us to participate in the care of this patient.     SIGNATURE: Salima Maddox PA-C  DATE: February 12, 2024  TIME: 11:29 AM    * This note was completed in part utilizing [employer] direct voice recognition software.   Grammatical errors, random word insertion, spelling mistakes, and incomplete sentences may be an occasional consequence of the system secondary to software limitations, ambient noise and hardware issues. At the time of dictation, efforts were made to edit, clarify and /or correct errors. Please read the chart carefully and recognize, using context, where substitutions have occurred.  If you have any questions or concerns about the context, text or information contained within the body of this dictation, please contact myself, the provider, for further clarification.

## 2024-02-13 ENCOUNTER — HOME HEALTH ADMISSION (OUTPATIENT)
Dept: HOME HEALTH SERVICES | Facility: HOME HEALTHCARE | Age: 64
End: 2024-02-13
Payer: COMMERCIAL

## 2024-02-13 PROBLEM — N20.0 RENAL CALCULI: Status: ACTIVE | Noted: 2024-02-13

## 2024-02-13 PROBLEM — R91.1 NODULE OF UPPER LOBE OF RIGHT LUNG: Status: ACTIVE | Noted: 2024-02-13

## 2024-02-13 LAB
ALBUMIN SERPL BCP-MCNC: 3.9 G/DL (ref 3.5–5)
ALP SERPL-CCNC: 60 U/L (ref 34–104)
ALT SERPL W P-5'-P-CCNC: 73 U/L (ref 7–52)
ANION GAP SERPL CALCULATED.3IONS-SCNC: 8 MMOL/L
AST SERPL W P-5'-P-CCNC: 35 U/L (ref 13–39)
BILIRUB SERPL-MCNC: 0.92 MG/DL (ref 0.2–1)
BUN SERPL-MCNC: 25 MG/DL (ref 5–25)
CALCIUM SERPL-MCNC: 9.7 MG/DL (ref 8.4–10.2)
CHLORIDE SERPL-SCNC: 99 MMOL/L (ref 96–108)
CO2 SERPL-SCNC: 32 MMOL/L (ref 21–32)
CREAT SERPL-MCNC: 1.15 MG/DL (ref 0.6–1.3)
ERYTHROCYTE [DISTWIDTH] IN BLOOD BY AUTOMATED COUNT: 14.1 % (ref 11.6–15.1)
GFR SERPL CREATININE-BSD FRML MDRD: 67 ML/MIN/1.73SQ M
GLUCOSE SERPL-MCNC: 123 MG/DL (ref 65–140)
HCT VFR BLD AUTO: 52.5 % (ref 36.5–49.3)
HGB BLD-MCNC: 17.4 G/DL (ref 12–17)
MCH RBC QN AUTO: 29.8 PG (ref 26.8–34.3)
MCHC RBC AUTO-ENTMCNC: 33.1 G/DL (ref 31.4–37.4)
MCV RBC AUTO: 90 FL (ref 82–98)
PLATELET # BLD AUTO: 280 THOUSANDS/UL (ref 149–390)
PMV BLD AUTO: 9.5 FL (ref 8.9–12.7)
POTASSIUM SERPL-SCNC: 3.9 MMOL/L (ref 3.5–5.3)
PROT SERPL-MCNC: 6.9 G/DL (ref 6.4–8.4)
RBC # BLD AUTO: 5.83 MILLION/UL (ref 3.88–5.62)
SODIUM SERPL-SCNC: 139 MMOL/L (ref 135–147)
WBC # BLD AUTO: 6.75 THOUSAND/UL (ref 4.31–10.16)

## 2024-02-13 PROCEDURE — 99232 SBSQ HOSP IP/OBS MODERATE 35: CPT | Performed by: STUDENT IN AN ORGANIZED HEALTH CARE EDUCATION/TRAINING PROGRAM

## 2024-02-13 PROCEDURE — 80053 COMPREHEN METABOLIC PANEL: CPT

## 2024-02-13 PROCEDURE — 85027 COMPLETE CBC AUTOMATED: CPT

## 2024-02-13 PROCEDURE — 99232 SBSQ HOSP IP/OBS MODERATE 35: CPT | Performed by: INTERNAL MEDICINE

## 2024-02-13 PROCEDURE — 99223 1ST HOSP IP/OBS HIGH 75: CPT | Performed by: STUDENT IN AN ORGANIZED HEALTH CARE EDUCATION/TRAINING PROGRAM

## 2024-02-13 RX ORDER — FUROSEMIDE 40 MG/1
40 TABLET ORAL DAILY
Status: DISCONTINUED | OUTPATIENT
Start: 2024-02-13 | End: 2024-02-15

## 2024-02-13 RX ORDER — METOPROLOL SUCCINATE 25 MG/1
25 TABLET, EXTENDED RELEASE ORAL 2 TIMES DAILY
Status: DISCONTINUED | OUTPATIENT
Start: 2024-02-13 | End: 2024-02-15

## 2024-02-13 RX ADMIN — METOPROLOL SUCCINATE 25 MG: 25 TABLET, EXTENDED RELEASE ORAL at 20:07

## 2024-02-13 RX ADMIN — ATORVASTATIN CALCIUM 40 MG: 40 TABLET, FILM COATED ORAL at 15:30

## 2024-02-13 RX ADMIN — LOSARTAN POTASSIUM 25 MG: 25 TABLET, FILM COATED ORAL at 08:24

## 2024-02-13 RX ADMIN — FUROSEMIDE 40 MG: 40 TABLET ORAL at 09:23

## 2024-02-13 RX ADMIN — METOPROLOL SUCCINATE 12.5 MG: 25 TABLET, FILM COATED, EXTENDED RELEASE ORAL at 08:24

## 2024-02-13 RX ADMIN — HEPARIN SODIUM 5000 UNITS: 5000 INJECTION INTRAVENOUS; SUBCUTANEOUS at 05:06

## 2024-02-13 RX ADMIN — ASPIRIN 81 MG: 81 TABLET, COATED ORAL at 08:24

## 2024-02-13 RX ADMIN — HEPARIN SODIUM 5000 UNITS: 5000 INJECTION INTRAVENOUS; SUBCUTANEOUS at 14:19

## 2024-02-13 RX ADMIN — MELATONIN 3 MG: at 20:07

## 2024-02-13 RX ADMIN — HEPARIN SODIUM 5000 UNITS: 5000 INJECTION INTRAVENOUS; SUBCUTANEOUS at 20:07

## 2024-02-13 NOTE — CASE MANAGEMENT
Case Management Assessment    Patient name Rohan Don  Location [facility] 528/[facility] 528-01 MRN [id number]  : 1960 Date 2024       Current Admission Date: 2024  Current Admission Diagnosis:Acute systolic congestive heart failure (HCC)   Patient Active Problem List    Diagnosis Date Noted    Nodule of upper lobe of right lung 2024    Renal calculi 2024    Coronary artery disease 2024    Severe protein-calorie malnutrition (HCC) 2024    Acute systolic congestive heart failure (HCC) 2024    Prediabetes 2024    DEISI (acute kidney injury) (HCC) 2024    Elevated transaminase level 2024    Pleural effusion 2024    HTN (hypertension) 2019    Vitamin D deficiency 2014      LOS (days): 4  Geometric Mean LOS (GMLOS) (days):   Days to GMLOS:     OBJECTIVE:  Risk of Unplanned Readmission Score: 9.78   Current admission status: Inpatient    Preferred Pharmacy:   [id number] - [facility] [address] [address]  Phone: [phone] Fax: [phone]    Primary Care Provider: No primary care provider on file.    Primary Insurance: [employer] PENDING  Pt has no active healthcare insurance nor any active prescription medication insurance.    ASSESSMENT:  Active Health Care Proxies    There are no active Health Care Proxies on file.    Advance Directives  Does patient have a Health Care POA?: No  Does patient have Advance Directives?: No  Primary Contact: pt's wife Teresita Don / phone# [phone]    Patient Information  Admitted from:: Home  Mental Status: Alert  Assessment information provided by:: Patient  Primary Caregiver: Self  Support Systems: Spouse/significant other  Home entry access options. Select all that apply.: Stairs  Number of steps to enter home.: 4  Do the steps have railings?: Yes  Type of Current Residence: [facility]  Living Arrangements: Lives w/ Spouse/significant other  Is patient a  ?: Yes    Activities of Daily Living Prior to Admission  Functional Status: Independent  Completes ADLs independently?: Yes  Ambulates independently?: Yes  Does patient use assisted devices?: No  Does patient currently own DME?: No  Does the patient have a history of Short-Term Rehab?: No  Does patient have a history of HHC?: No    Patient Information Continued  Income Source: Unknown  Does patient have prescription coverage?: No  Does patient receive dialysis treatments?: No  Does patient have a history of substance abuse?: No  Does patient have a history of Mental Health Diagnosis?: No    Means of Transportation  Means of Transport to Appts:: Drives Self    Social Determinants of Health (SDOH)      Flowsheet Row Most Recent Value   Housing Stability    In the last 12 months, was there a time when you were not able to pay the mortgage or rent on time? N   In the last 12 months, how many places have you lived? 1   In the last 12 months, was there a time when you did not have a steady place to sleep or slept in a shelter (including now)? N   Transportation Needs    In the past 12 months, has lack of transportation kept you from medical appointments or from getting medications? no   In the past 12 months, has lack of transportation kept you from meetings, work, or from getting things needed for daily living? No   Food Insecurity    Within the past 12 months, you worried that your food would run out before you got the money to buy more. Never true   Within the past 12 months, the food you bought just didn't last and you didn't have money to get more. Never true   Utilities    In the past 12 months has the electric, gas, oil, or water company threatened to shut off services in your home? No     Additional Comments: CM reviewed d/c planning process including the following: identifying help at home, patient preference for d/c planning needs, availability of treatment team to discuss questions or concerns patient  and/or family may have regarding understanding medications and recognizing signs and symptoms once discharged. CM also encouraged patient to follow up with all recommended appointments after discharge. Patient advised of importance for patient and family to participate in managing patient’s medical well being. Patient/caregiver received discharge checklist. Content reviewed. Patient/caregiver encouraged to participate in discharge plan of care prior to discharge home.

## 2024-02-13 NOTE — PROGRESS NOTES
INTERNAL MEDICINE RESIDENCY PROGRESS NOTE     Name: Rohan Don   Age & Sex: 63 y.o. male   MRN: [id number]  [facility]   Encounter: [id number]  Team: SOD Team A    PATIENT INFORMATION     Name: Rohan Don   Age & Sex: 63 y.o. male   MRN: [id number]  Hospital Stay Days: 4    ASSESSMENT/PLAN     Principal Problem:    Acute systolic congestive heart failure (HCC)  Active Problems:    Coronary artery disease    Prediabetes    DEISI (acute kidney injury) (HCC)    Elevated transaminase level    HTN (hypertension)    Pleural effusion    Severe protein-calorie malnutrition (HCC)    Nodule of upper lobe of right lung    Renal calculi      * Acute systolic congestive heart failure (HCC)  Assessment & Plan  Wt Readings from Last 3 Encounters:   02/13/24 55.7 kg (122 lb 12.7 oz)     Noted EF 20% LVEF on outpatient echo per patient. His neighbor, a physician assistant, recommended he come to the cardiac center she works at to get an EKG and TTE. After echo was read, patient was recommended to immediately go to ED for evaluation.  +BNP on admission 2788  Sinus tachycardia on admission  EKG w/L atrial enlargement/hypertrophy, R axis, nonspecific ST changes w/negative troponins x3   ms  QtC 496 ms    Lab Results   Component Value Date    HSTNI0 35 02/09/2024    HSTNI2 35 02/09/2024    HSTNID2 0 02/09/2024    HSTNI4 32 02/09/2024    HSTNID4 -3 02/09/2024     Echo 2/10 showed Left Ventricle: Left ventricular cavity size is mildly dilated. Wall thickness is normal. The left ventricular ejection fraction is 20%. Systolic function is severely reduced. The cardiac output is 2.04 L/min and the cardiac index is 1.13 L/min/m2. There is severe global hypokinesis. Diastolic function is moderately abnormal    Plan:  Heart failure following; appreciate recs  Goal output: net -1.0 to -1.5 mL  Transitioned to 40 mg PO Lasix daily  Continue to monitor I/Os and weights  Currently on Losartan 25 mg daily and Metoprolol 25  "mg BID  Will start Spironolactone 25mg QD prior to discharge  Respiratory protocol, maintain O2 sat >88%  Can add BiPAP PRN if patient develops increased WOB  2L fluid restriction, 2g Na cardiac diet    Coronary artery disease  Assessment & Plan  Cardiac cath (2/12/24) showed Prox LAD and Ostial RPDA disease with small-vessel OM1 lesion as well in the Prox segment. LVEDP is mildly elevated without gradient on LV-AO pullback    Plan:  CT surgery consulted for CABG eval  Preop eval with cardiac MRI to assess myocardial viability - read pending  Proposed surgery would be CABG x2-3 with LAD, PDA, and possibly OM targets, as well as likely mitral valve ring annuloplasty.   Continue Aspirin 81mg daily and Atorvastatin 40mg daily  SubQ heparin    Renal calculi  Assessment & Plan  CT chest/abd/pelvis (2/12) obtained for CABG pre-op eval revealed an \"bilateral mid renal subcentimeter hyperdense foci, with features suggesting small calyceal diverticula, although calcifications or hyperdense lesions cannot be entirely excluded.\"    Patient denies flank pain, hematuria, dysuria, or any other urinary symptoms.    Plan:  Consider renal ultrasound may be useful for further evaluation.  OP follow up    Nodule of upper lobe of right lung  Assessment & Plan  CT chest/abd/pelvis (2/12) obtained for CABG pre-op eval revealed an \"irregular non-mass-like density in the right upper lobe measuring 1.4 cm, with morphology suggesting an infectious/inflammatory etiology or scarring. However, malignancy cannot be excluded.\"    No smoking history. 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    Plan:  Pulmonology consulted; appreciate recs  Consider OP PET/CT versus 3-month follow-up chest CT    Severe protein-calorie malnutrition (HCC)  Assessment & Plan  Malnutrition Findings:   Adult Malnutrition type: Chronic illness  Adult Degree of Malnutrition: Other severe protein calorie malnutrition  Malnutrition " Characteristics: Fat loss, Muscle loss, Inadequate energy, Weight loss, Fluid accumulation            360 Statement: Other severe protein calorie malnutrition related to decreased appetite as evidenced by severe fat and muscle loss, 17.9% weight loss X 5 months. Treated with nutritional supplements    BMI Findings:  Adult BMI Classifications: Underweight < 18.5        Body mass index is 17.13 kg/m².     Plan:  Nutrition consulted  Nutrition to add 1 Van Glucerna at breakfast, Berry Magic Cup at lunch and van Ensure Plus High Protein at dinner as compromise to not increase BG levels but still provide adequate kcal/pro intake.     Pleural effusion  Assessment & Plan  CT chest/abd/pelvis (2/12/24) showed moderate bilateral pleural effusions and bibasilar atelectasis.  Likely 2/2 CHF    Plan:  Refer to congestive heart failure A&P  Continue lasix  Heart failure following      HTN (hypertension)  Assessment & Plan  Managed with diet/exercise per patient and previously has been well controlled    BP on admission largely normotensive    Plan:  Currently on Losartan 25 mg daily and Metoprolol 25 mg BID  Will start Spironolactone 25mg QD prior to discharge  Heart failure following; appreciate recs  Goal normotension and MAP>65 mmHg while diuresing    Elevated transaminase level  Assessment & Plan  Noted on 2/5/24 labs (4 days prior to admission)    Suspect congestive hepatopathy from CHF vs. DILI from 3 weeks of abx vs. rarer etiologies such as hemachromatosis  - Increased LFTs with ~ decreased TP and Alb on CMP today  - Iron panel unremarkable    Plan:  F/U daily CMP  Consider adding viral hepatitis screen  Depending on results of repeat labs, can consider RUQ U/S +/- hepatitis labs for chronic exposures    DEISI (acute kidney injury) (HCC)  Assessment & Plan  Recent Labs     02/11/24  0748 02/12/24  0544 02/13/24  0505   CREATININE 1.04 0.88 1.15   EGFR 76 92 67     Estimated Creatinine Clearance: 52.5 mL/min (by C-G formula  based on SCr of 1.15 mg/dL).    Presumably DEISI as Cr >1.0 by greater than 25% per KDIGO  Suspect prerenal due to CHF as supported by abnormal LFTs (hepatic congestion)    Plan:  Continue to monitor kidney function  Avoid nephrotoxins as able  OP BMP follow up in 3+ months to determine if component of CKD present    Prediabetes  Assessment & Plan  Managed w/[employer] in [place], [place] (patient's prior residence) with diet/exercise    A1c is 6.1        Disposition: Continue inpatient management      SUBJECTIVE     Patient seen and examined. No acute events overnight. Patient denies SOB, CP, palpitations or any other symptoms at this time at rest or with ambulation.    OBJECTIVE     Vitals:    24 0246 24 0532 24 0652 24 1103   BP: 105/74  104/73 117/78   BP Location:   Left arm Right arm   Pulse: 92  95 101   Resp: 17   17   Temp: 98.1 °F (36.7 °C)  (!) 97.4 °F (36.3 °C) (!) 97 °F (36.1 °C)   TempSrc:    Oral   SpO2: 95%  93% 93%   Weight:  55.7 kg (122 lb 12.7 oz)     Height:          Temperature:   Temp (24hrs), Av.5 °F (36.4 °C), Min:97 °F (36.1 °C), Max:98.1 °F (36.7 °C)    Temperature: (!) 97 °F (36.1 °C)  Intake & Output:  I/O          07 0700  0701   0700  07 0700    P.O. 1141 1320 360    Total Intake(mL/kg) 1141 (19.6) 1320 (23.7) 360 (6.5)    Urine (mL/kg/hr) 575 (0.4) 3150 (2.4) 150 (0.6)    Total Output 575 3150 150    Net +566 -1830 +210                 Weights:   IBW (Ideal Body Weight): 75.3 kg    Body mass index is 17.13 kg/m².  Weight (last 2 days)       Date/Time Weight    24 0532 55.7 (122.8)    24 1029 58.1 (128.09)    24 0546 58.1 (128.09)    24 0600 58.1 (128.09)          Physical Exam  Vitals and nursing note reviewed.   Constitutional:       General: He is not in acute distress.     Appearance: He is well-developed and underweight.   HENT:      Head: Normocephalic and atraumatic.   Eyes:      Conjunctiva/sclera:  Conjunctivae normal.   Cardiovascular:      Rate and Rhythm: Normal rate and regular rhythm.      Heart sounds: No murmur heard.  Pulmonary:      Effort: Pulmonary effort is normal. No respiratory distress.      Breath sounds: Normal breath sounds.   Abdominal:      Palpations: Abdomen is soft.      Tenderness: There is no abdominal tenderness.   Musculoskeletal:         General: No swelling.      Cervical back: Neck supple.   Skin:     General: Skin is warm and dry.      Capillary Refill: Capillary refill takes less than 2 seconds.   Neurological:      General: No focal deficit present.      Mental Status: He is alert and oriented to person, place, and time. Mental status is at baseline.   Psychiatric:         Mood and Affect: Mood normal.       LABORATORY DATA     Labs: I have personally reviewed pertinent reports.  Results from last 7 days   Lab Units 02/13/24  0505 02/12/24  0544 02/11/24  0748 02/10/24  0517   WBC Thousand/uL 6.75 6.46 6.68 9.35   HEMOGLOBIN g/dL 17.4* 15.5 16.0 15.3   HEMATOCRIT % 52.5* 49.1 47.3 47.6   PLATELETS Thousands/uL 280 266 245 248   NEUTROS PCT %  --  65 62 66   MONOS PCT %  --  12 12 10   EOS PCT %  --  4 5 3      Results from last 7 days   Lab Units 02/13/24  0505 02/12/24  0544 02/11/24  0748   POTASSIUM mmol/L 3.9 4.2 3.4*   CHLORIDE mmol/L 99 107 104   CO2 mmol/L 32 23 28   BUN mg/dL 25 21 24   CREATININE mg/dL 1.15 0.88 1.04   CALCIUM mg/dL 9.7 9.4 9.1   ALK PHOS U/L 60 52 53   ALT U/L 73* 67* 81*   AST U/L 35 26 32     Results from last 7 days   Lab Units 02/12/24  0544 02/10/24  0517   MAGNESIUM mg/dL 1.9 2.0     Results from last 7 days   Lab Units 02/12/24  0544   PHOSPHORUS mg/dL 4.5*          Results from last 7 days   Lab Units 02/09/24  1925   LACTIC ACID mmol/L 1.9           IMAGING & DIAGNOSTIC TESTING     Radiology Results: I have personally reviewed pertinent reports.  CT chest abdomen pelvis wo contrast    Result Date: 2/13/2024  Impression: 1. Moderate bilateral  pleural effusions and bibasilar atelectasis. 2. Irregular non-mass-like density in the right upper lobe measuring 1.4 cm, with morphology suggesting an infectious/inflammatory etiology or scarring. However, malignancy cannot be excluded. PET/CT versus 3-month follow-up chest CT is recommended. 3. Bilateral mid renal subcentimeter hyperdense foci, with features suggesting small calyceal diverticula, although calcifications or hyperdense lesions cannot be entirely excluded. Renal ultrasound may be useful for further evaluation. The study was marked in EPIC for immediate notification. Workstation performed: [id number]     XR chest pa & lateral    Result Date: 2/9/2024  Impression: Moderate bilateral pleural effusions with bibasilar atelectasis and/or airspace disease noted. Workstation performed: [id number]     Other Diagnostic Testing: I have personally reviewed pertinent reports.    ACTIVE MEDICATIONS     Current Facility-Administered Medications   Medication Dose Route Frequency    acetaminophen (TYLENOL) tablet 650 mg  650 mg Oral Q4H PRN    aluminum-magnesium hydroxide-simethicone (MAALOX) oral suspension 30 mL  30 mL Oral Q6H PRN    aspirin (ECOTRIN LOW STRENGTH) EC tablet 81 mg  81 mg Oral Daily    atorvastatin (LIPITOR) tablet 40 mg  40 mg Oral Daily With Dinner    furosemide (LASIX) tablet 40 mg  40 mg Oral Daily    heparin (porcine) subcutaneous injection 5,000 Units  5,000 Units Subcutaneous Q8H JENN    losartan (COZAAR) tablet 25 mg  25 mg Oral Daily    melatonin tablet 3 mg  3 mg Oral HS    metoprolol succinate (TOPROL-XL) 24 hr tablet 25 mg  25 mg Oral BID    ondansetron (ZOFRAN) injection 4 mg  4 mg Intravenous Q6H PRN    oxyCODONE-acetaminophen (PERCOCET) 5-325 mg per tablet 1 tablet  1 tablet Oral Q4H PRN    polyethylene glycol (MIRALAX) packet 17 g  17 g Oral Daily    senna (SENOKOT) tablet 8.6 mg  1 tablet Oral Daily       VTE Pharmacologic Prophylaxis: Heparin  VTE Mechanical Prophylaxis: sequential  "compression device    Portions of the record may have been created with voice recognition software.  Occasional wrong word or \"sound a like\" substitutions may have occurred due to the inherent limitations of voice recognition software.  Read the chart carefully and recognize, using context, where substitutions have occurred.  ==  Juana Shah MD  [facility]  Internal Medicine Residency PGY-1      "

## 2024-02-13 NOTE — CASE MANAGEMENT
Case Management Note    Patient name Rohan Don  Location [facility] 528/[facility] 528-01 MRN [id number]  : 1960 Date 2024       Current Admission Date: 2024  Current Admission Diagnosis:Acute systolic congestive heart failure (HCC)      LOS (days): 4  Geometric Mean LOS (GMLOS) (days):   Days to GMLOS:     OBJECTIVE:  Risk of Unplanned Readmission Score: 9.78   Current admission status: Inpatient   Primary Insurance: [employer] PENDING    DISCHARGE DETAILS:  Discharge planning discussed with:: Patient  Freedom of Choice: Yes  Were Treatment Team discharge recommendations reviewed with patient/caregiver?: Yes  Did patient/caregiver verbalize understanding of patient care needs?: Yes  Were patient/caregiver advised of the risks associated with not following Treatment Team discharge recommendations?: Yes    Requested Home Health Care         Is the patient interested in HHC at discharge?: Yes  Home Health Discipline requested:: Nursing  Home Health Agency Name:: [facility]  Home Health Follow-Up Provider:: Referring Provider  Home Health Services Needed:: Post-Op Care and Assessment  Homebound Criteria Met:: Requires the Assistance of Another Person for Safe Ambulation or to Leave the Home  Supporting Clincal Findings:: Limited Endurance    Treatment Team Recommendation: Home with Home Health Care    Additional Comments: Pt has mvCAD and will undergo CABG surgery; date of procedure is TBD. Due to this anticipated surgery, the pt is recommended to have in-home post-op skilled nursing care via a VNA for his aftercare plan. CM spoke to pt about this aftercare recommendation. Pt is agreeable w/ recommendation. CM provided pt w/ freedom of choice for [facility]NA referrals. [facility] CM made AIDIN referral to same. CM to follow.

## 2024-02-13 NOTE — ASSESSMENT & PLAN NOTE
"CT chest/abd/pelvis (2/12) obtained for CABG pre-op eval revealed an \"irregular non-mass-like density in the right upper lobe measuring 1.4 cm, with morphology suggesting an infectious/inflammatory etiology or scarring. However, malignancy cannot be excluded.\" No smoking history. 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    Plan:  Pulmonology consulted; will follow up as outpatient and repeat imaging.   "

## 2024-02-13 NOTE — ASSESSMENT & PLAN NOTE
Malnutrition Findings:   Adult Malnutrition type: Chronic illness  Adult Degree of Malnutrition: Other severe protein calorie malnutrition  Malnutrition Characteristics: Fat loss, Muscle loss, Inadequate energy, Weight loss, Fluid accumulation            360 Statement: Other severe protein calorie malnutrition related to decreased appetite as evidenced by severe fat and muscle loss, 17.9% weight loss X 5 months. Treated with nutritional supplements    BMI Findings:  Adult BMI Classifications: Underweight < 18.5        Body mass index is 17.13 kg/m².     Plan:  Nutrition consulted - added 1 Van Glucerna at breakfast, Berry Magic Cup at lunch and van Ensure Plus High Protein at dinner as compromise to not increase BG levels but still provide adequate kcal/pro intake.

## 2024-02-13 NOTE — PROGRESS NOTES
Patient:    MRN:  [id number]    Aidin Request ID:  [id number]    Level of care reserved:  Home Health Agency    Partner Reserved:  [facility], [place] [address] [phone]    Clinical needs requested:    Geography searched:  [id number]    Start of Service:    Request sent:  1:04pm EST on 2/13/2024 by Hair Alvarenga    Partner reserved:  1:09pm EST on 2/13/2024 by Hair Alvarenga    Choice list shared:  1:09pm EST on 2/13/2024 by Hair Alvarenga

## 2024-02-13 NOTE — PROGRESS NOTES
"Advanced Heart Failure Team Progress Note - Rohan Don 63 y.o. male MRN: [id number]    Unit/Bed#: [facility] 528-01 Encounter: [id number]    Subjective:   Patient seen and examined. No events overnight. Reports breathing continues to improve and feels more at baseline. Able to ambulate without shortness of breath. No chest pain.       Vitals: Blood pressure 117/78, pulse 101, temperature (!) 97 °F (36.1 °C), temperature source Oral, resp. rate 17, height 5' 11\" (1.803 m), weight 55.7 kg (122 lb 12.7 oz), SpO2 93%., Body mass index is 17.13 kg/m².,   Orthostatic Blood Pressures      Flowsheet Row Most Recent Value   Blood Pressure 117/78 filed at 02/13/2024 1103   Patient Position - Orthostatic VS Lying filed at 02/13/2024 1103              Intake/Output Summary (Last 24 hours) at 2/13/2024 1238  Last data filed at 2/13/2024 1001  Gross per 24 hour   Intake 1440 ml   Output 3300 ml   Net -1860 ml       Review of System:  Review of system was conducted and was negative except for as stated in the subjective course.      Physical Exam  Constitutional:       General: He is not in acute distress.  Cardiovascular:      Rate and Rhythm: Normal rate and regular rhythm.      Heart sounds: No murmur heard.  Pulmonary:      Effort: Pulmonary effort is normal.   Musculoskeletal:      Right lower leg: No edema.      Left lower leg: No edema.   Skin:     General: Skin is warm.   Neurological:      Mental Status: He is alert.          Current Facility-Administered Medications:     acetaminophen (TYLENOL) tablet 650 mg, 650 mg, Oral, Q4H PRN, Daniel Esteban MD    aluminum-magnesium hydroxide-simethicone (MAALOX) oral suspension 30 mL, 30 mL, Oral, Q6H PRN, Daniel Esteban MD    aspirin (ECOTRIN LOW STRENGTH) EC tablet 81 mg, 81 mg, Oral, Daily, Jcarlos Yanes MD, 81 mg at 02/13/24 0824    atorvastatin (LIPITOR) tablet 40 mg, 40 mg, Oral, Daily With Dinner, Jcarlos Yanes MD, 40 mg at 02/12/24 1559    furosemide (LASIX) tablet 40 mg, 40 " mg, Oral, Daily, Jcarlos Yanes MD, 40 mg at 02/13/24 0923    heparin (porcine) subcutaneous injection 5,000 Units, 5,000 Units, Subcutaneous, Q8H JENN, 5,000 Units at 02/13/24 0506 **AND** [CANCELED] Platelet count, , , Once, Camila Joy MD    losartan (COZAAR) tablet 25 mg, 25 mg, Oral, Daily, Daniel Esteban MD, 25 mg at 02/13/24 0824    melatonin tablet 3 mg, 3 mg, Oral, HS, Daniel Esteban MD, 3 mg at 02/12/24 2100    metoprolol succinate (TOPROL-XL) 24 hr tablet 25 mg, 25 mg, Oral, BID, Jcarlos Yanes MD    ondansetron (ZOFRAN) injection 4 mg, 4 mg, Intravenous, Q6H PRN, Daniel Esteban MD    oxyCODONE-acetaminophen (PERCOCET) 5-325 mg per tablet 1 tablet, 1 tablet, Oral, Q4H PRN, Daniel Esteban MD    polyethylene glycol (MIRALAX) packet 17 g, 17 g, Oral, Daily, Daniel Esteban MD    senna (SENOKOT) tablet 8.6 mg, 1 tablet, Oral, Daily, Daniel Esteban MD    Labs & Results:  Results from last 7 days   Lab Units 02/09/24  2023 02/09/24  1815 02/09/24  1613   HS TNI 0HR ng/L  --   --  35   HS TNI 2HR ng/L  --  35  --    HS TNI 4HR ng/L 32  --   --          Results from last 7 days   Lab Units 02/13/24  0505 02/12/24  0544 02/11/24  0748   POTASSIUM mmol/L 3.9 4.2 3.4*   CO2 mmol/L 32 23 28   CHLORIDE mmol/L 99 107 104   BUN mg/dL 25 21 24   CREATININE mg/dL 1.15 0.88 1.04     Results from last 7 days   Lab Units 02/13/24  0505 02/12/24  0544 02/11/24  0748   HEMOGLOBIN g/dL 17.4* 15.5 16.0   HEMATOCRIT % 52.5* 49.1 47.3   PLATELETS Thousands/uL 280 266 245     Results from last 7 days   Lab Units 02/09/24  1613   TRIGLYCERIDES mg/dL 96   HDL mg/dL 23*   LDL CALC mg/dL 66   HEMOGLOBIN A1C % 6.1*           Assessment    63 year old male presented with progressive shortness of breath and sent in for admission after significantly reduced LVEF was noted on outpatient echo.   Symptoms improved with IV diuretics. Cardiac catheterization showed severe multivessel CAD, pending CABG evaluation.     Acute  Decompensated Heart Failure with reduced ejection fraction (ACC Stage C / NYHA Class III) secondary to ischemic cardiomyopathy     Severe multivessel coronary artery disease    Pre-renal DEISI 2/2 venous congestion, resolved    Hypertension    Alcohol use    On presentation, patient was mildly hypertensive (132/101), and tachycardic. No evidence of infection or anemia.  Serial troponin negative.   BNP 2788  TSH normal with FT4 mildly elevated at 1.4. No symptoms of hyperthyroidism.    Iron studies with Ferritin 121 and Tsat 15%.   No indication for IV iron with Hb > 15.     HbA1c 6.1  Lipid profile HDL 23, LDL 66    ECG: Sinus tachycardia with left atrial enlargement, right axis deviation, PRWP, and non-specific T wave changes      Echocardiogram (2/10): Severe biventricular dysfunction with LVEF 15-20%. LV globally hypokinetic. Grade 2 diastolic dysfunction. Severe posteriorly directed MR    LHC performed 2/12:   -Right dominant circulation   -Focal obstructive disease in RCA, rPDA, mid-LAD, and pLCx  LVEDP: 22 mmHg    STS Risk Score: 2.6% operative mortality    Cardiac MRI reviewed, showing subendocardial LGE uptake 30-40% in the basal-mid inferior-inferoseptal wall suggestive of moderate viability. Pending official read.    Neurohormonal Blockade:  --Beta-Blocker: Metoprolol Succinate 25 mg BID    --ACEi, ARB or ARNi: Losartan 25mg QD for now, will switch to ARNi outpatient after insurance activation    --Aldosterone Receptor Blocker: Will initiate Spironolactone 25mg QD prior to discharge    --SGLT2i: Will add after insurance activation as outpatient    --Diuretic: Furosemide PO 40mg Daily  .  Weight 137 lb (initial) -> 128 lb -> 122 lb today    Sudden Cardiac Death Risk Reduction:  --ICD: Will consider implantation after 3 months of GDMT if EF remains < 35%  --Discussed interim LifeVest, patient is agreeable.     Electrical Resynchronization:  --Candidacy for BiV device: Narrow QRS    Advanced Therapies: Possible  candidate for LVAD / transplant if surgical turndown     Plan:  -Continue CT surgery evaluations for possible CABG  -Aspirin 81mg daily  -Atorvastatin 40mg daily  -Switch to PO furosemide 40mg daily   -Continue GDMT as above    Jcarlos Yanes MD  Cardiology Fellow

## 2024-02-13 NOTE — ASSESSMENT & PLAN NOTE
"CT chest/abd/pelvis (2/12) obtained for CABG pre-op eval revealed an \"bilateral mid renal subcentimeter hyperdense foci, with features suggesting small calyceal diverticula, although calcifications or hyperdense lesions cannot be entirely excluded.\" Patient denies flank pain, hematuria, dysuria, or any other urinary symptoms.    Plan:  Consider renal ultrasound may be useful for further evaluation.  Recommend outpatient follow up  "

## 2024-02-13 NOTE — CONSULTS
PULMONOLOGY CONSULT NOTE     Name: Rohan Don   Age & Sex: 63 y.o. male   MRN: [id number]  Unit/Bed#: [facility] 528-01   Encounter: [id number]        Reason for consultation: pulmonary nodule    Requesting physician: Roxy Menjivar    Assessment and Plan:    Pulmonary nodule  HFrEF EF20% due to ischemic cardiomyopathy  CAD with multivessel disease  malnutrition    - incidental finding of pulmonary nodules. Concern for possible malignancy. Patient endores of 10 lbs weight loss since last year. No cough. No smoking.   - he needs a pulmonary follow up at outpatient to complete workup for the pulmonary nodule. We will arrange upon follow up  - on room air. He has no other underline pulmonary disease. He is optimized for surgery on pulmonary standpoint.     History of Present Illness   HPI:  Rohan Don is a 63 y.o. male with PMHx of hypertension, was initially admited to our hospital due to a new finding of acute decompensated heart failure with EF of 20%. He complained about cough, dyspnea on excertion, for about few weeks before presentation. He was initially treated with abx by urgent care but symptoms persisted. He was eventually evaluated by cardiology and was referred to [facility] due to a new findings of EF 20% likely due to ischemic cardiomyopathy. Throughout the course he underwent cardiac cath and found to have multivessel disease. He was in the process of evaluating CABG. A CT chest was done as part of the workup showing multiple lung nodules on the RUL and RLL. We were consulted for pulmonary nodule.    Patient is a never smoker. He worked at [employer] before and might be exposed to chemicals when he was young. He otherwise has no other pulmonary disease in the past. He has no cough, no night sweats, and no hemoptysis. His dyspnea on excertion gradually improved while in the hospital. He did report aboutn 10 lbs weight loss due to stress recently when moving to [place] since 2023. He has no prior CT scan to  compare.    Review of systems:  12 point review of systems was completed and was otherwise negative except as listed in HPI.      Historical Information   History reviewed. No pertinent past medical history.  Past Surgical History:   Procedure Laterality Date    CARDIAC CATHETERIZATION N/A 2/12/2024    Procedure: Cardiac catheterization;  Surgeon: Shanon Pal DO;  Location: [facility];  Service: Cardiology    CARDIAC CATHETERIZATION Left 2/12/2024    Procedure: Cardiac Left Heart Cath;  Surgeon: Shanon Pal DO;  Location: [facility];  Service: Cardiology    CARDIAC CATHETERIZATION N/A 2/12/2024    Procedure: Cardiac Coronary Angiogram;  Surgeon: Shanon Pal DO;  Location: [facility];  Service: Cardiology     History reviewed. No pertinent family history.    Occupational History: worked for [employer] in the past    Social History: never smoker  Social History     Tobacco Use   Smoking Status Never   Smokeless Tobacco Never         Meds/Allergies   Current Facility-Administered Medications   Medication Dose Route Frequency    acetaminophen (TYLENOL) tablet 650 mg  650 mg Oral Q4H PRN    aluminum-magnesium hydroxide-simethicone (MAALOX) oral suspension 30 mL  30 mL Oral Q6H PRN    aspirin (ECOTRIN LOW STRENGTH) EC tablet 81 mg  81 mg Oral Daily    atorvastatin (LIPITOR) tablet 40 mg  40 mg Oral Daily With Dinner    furosemide (LASIX) tablet 40 mg  40 mg Oral Daily    heparin (porcine) subcutaneous injection 5,000 Units  5,000 Units Subcutaneous Q8H JENN    losartan (COZAAR) tablet 25 mg  25 mg Oral Daily    melatonin tablet 3 mg  3 mg Oral HS    metoprolol succinate (TOPROL-XL) 24 hr tablet 25 mg  25 mg Oral BID    ondansetron (ZOFRAN) injection 4 mg  4 mg Intravenous Q6H PRN    oxyCODONE-acetaminophen (PERCOCET) 5-325 mg per tablet 1 tablet  1 tablet Oral Q4H PRN    polyethylene glycol (MIRALAX) packet 17 g  17 g Oral Daily    senna (SENOKOT) tablet 8.6 mg  1 tablet Oral  "Daily     No medications prior to admission.     No Known Allergies    Vitals: Blood pressure 104/73, pulse 95, temperature (!) 97.4 °F (36.3 °C), resp. rate 17, height 5' 11\" (1.803 m), weight 55.7 kg (122 lb 12.7 oz), SpO2 93%., Room air Body mass index is 17.13 kg/m².      Intake/Output Summary (Last 24 hours) at 2/13/2024 1056  Last data filed at 2/13/2024 1001  Gross per 24 hour   Intake 1680 ml   Output 3300 ml   Net -1620 ml       Physical Exam  Constitutional:       Appearance: He is not ill-appearing.   HENT:      Head: Normocephalic.   Cardiovascular:      Rate and Rhythm: Normal rate and regular rhythm.   Pulmonary:      Breath sounds: No decreased breath sounds, wheezing or rhonchi.   Chest:      Chest wall: No mass.   Abdominal:      Palpations: Abdomen is soft.   Musculoskeletal:         General: Normal range of motion.      Right lower leg: No edema.      Left lower leg: No edema.   Skin:     General: Skin is warm.      Capillary Refill: Capillary refill takes less than 2 seconds.   Neurological:      Mental Status: He is alert and oriented to person, place, and time.         Labs: I have personally reviewed pertinent lab results.  Laboratory and Diagnostics  Results from last 7 days   Lab Units 02/13/24  0505 02/12/24  0544 02/11/24  0748 02/10/24  0517 02/09/24  1613   WBC Thousand/uL 6.75 6.46 6.68 9.35 9.56   HEMOGLOBIN g/dL 17.4* 15.5 16.0 15.3 16.7   HEMATOCRIT % 52.5* 49.1 47.3 47.6 50.6*   PLATELETS Thousands/uL 280 266 245 248 267   NEUTROS PCT %  --  65 62 66 73   MONOS PCT %  --  12 12 10 9   EOS PCT %  --  4 5 3 1     Results from last 7 days   Lab Units 02/13/24  0505 02/12/24  0544 02/11/24  0748 02/10/24  0517 02/09/24  1613   SODIUM mmol/L 139 139 139 137 136   POTASSIUM mmol/L 3.9 4.2 3.4* 3.8 4.3   CHLORIDE mmol/L 99 107 104 104 104   CO2 mmol/L 32 23 28 26 25   ANION GAP mmol/L 8 9 7 7 7   BUN mg/dL 25 21 24 25 25   CREATININE mg/dL 1.15 0.88 1.04 1.27 1.38*   CALCIUM mg/dL 9.7 9.4 " 9.1 8.9 9.2   GLUCOSE RANDOM mg/dL 123 114 104 113 117   ALT U/L 73* 67* 81* 115*  --    AST U/L 35 26 32 56*  --    ALK PHOS U/L 60 52 53 55  --    ALBUMIN g/dL 3.9 3.4* 3.3* 3.4*  --    TOTAL BILIRUBIN mg/dL 0.92 0.89 0.91 1.02*  --      Results from last 7 days   Lab Units 02/12/24  0544 02/10/24  0517   MAGNESIUM mg/dL 1.9 2.0   PHOSPHORUS mg/dL 4.5*  --                Results from last 7 days   Lab Units 02/09/24  1925   LACTIC ACID mmol/L 1.9     Results from last 7 days   Lab Units 02/09/24  1613   FERRITIN ng/mL 121                         Imaging and other studies: I have personally reviewed pertinent films in PACS  CT chest abdomen pelvis wo contrast    Result Date: 2/13/2024  Impression: 1. Moderate bilateral pleural effusions and bibasilar atelectasis. 2. Irregular non-mass-like density in the right upper lobe measuring 1.4 cm, with morphology suggesting an infectious/inflammatory etiology or scarring. However, malignancy cannot be excluded. PET/CT versus 3-month follow-up chest CT is recommended. 3. Bilateral mid renal subcentimeter hyperdense foci, with features suggesting small calyceal diverticula, although calcifications or hyperdense lesions cannot be entirely excluded. Renal ultrasound may be useful for further evaluation. The study was marked in EPIC for immediate notification. Workstation performed: [id number]     XR chest pa & lateral    Result Date: 2/9/2024  Impression: Moderate bilateral pleural effusions with bibasilar atelectasis and/or airspace disease noted. Workstation performed: [id number]       Code Status: Level 1 - Full Code      Radha Chinchilla MD  Pulmonary/Critical Care Fellow  [facility]

## 2024-02-14 ENCOUNTER — APPOINTMENT (INPATIENT)
Dept: NON INVASIVE DIAGNOSTICS | Facility: HOSPITAL | Age: 64
DRG: 163 | End: 2024-02-14
Payer: COMMERCIAL

## 2024-02-14 ENCOUNTER — ANESTHESIA EVENT (INPATIENT)
Dept: PERIOP | Facility: HOSPITAL | Age: 64
DRG: 163 | End: 2024-02-14
Payer: COMMERCIAL

## 2024-02-14 ENCOUNTER — PREP FOR PROCEDURE (OUTPATIENT)
Dept: CARDIAC SURGERY | Facility: CLINIC | Age: 64
End: 2024-02-14

## 2024-02-14 DIAGNOSIS — I25.10 DISEASE OF CARDIOVASCULAR SYSTEM: Primary | ICD-10-CM

## 2024-02-14 DIAGNOSIS — I50.21 ACUTE SYSTOLIC HF (HEART FAILURE) (HCC): ICD-10-CM

## 2024-02-14 LAB
ABO GROUP BLD: NORMAL
ABO GROUP BLD: NORMAL
ALBUMIN SERPL BCP-MCNC: 3.6 G/DL (ref 3.5–5)
ALP SERPL-CCNC: 63 U/L (ref 34–104)
ALT SERPL W P-5'-P-CCNC: 79 U/L (ref 7–52)
ANION GAP SERPL CALCULATED.3IONS-SCNC: 11 MMOL/L
AST SERPL W P-5'-P-CCNC: 51 U/L (ref 13–39)
BACTERIA BLD CULT: NORMAL
BACTERIA BLD CULT: NORMAL
BILIRUB SERPL-MCNC: 0.64 MG/DL (ref 0.2–1)
BLD GP AB SCN SERPL QL: NEGATIVE
BUN SERPL-MCNC: 29 MG/DL (ref 5–25)
CALCIUM SERPL-MCNC: 9.6 MG/DL (ref 8.4–10.2)
CHLORIDE SERPL-SCNC: 102 MMOL/L (ref 96–108)
CO2 SERPL-SCNC: 24 MMOL/L (ref 21–32)
CREAT SERPL-MCNC: 0.96 MG/DL (ref 0.6–1.3)
ERYTHROCYTE [DISTWIDTH] IN BLOOD BY AUTOMATED COUNT: 13.6 % (ref 11.6–15.1)
GFR SERPL CREATININE-BSD FRML MDRD: 84 ML/MIN/1.73SQ M
GLUCOSE SERPL-MCNC: 107 MG/DL (ref 65–140)
GLUCOSE SERPL-MCNC: 114 MG/DL (ref 65–140)
GLUCOSE SERPL-MCNC: 140 MG/DL (ref 65–140)
GLUCOSE SERPL-MCNC: 142 MG/DL (ref 65–140)
HCT VFR BLD AUTO: 51.1 % (ref 36.5–49.3)
HGB BLD-MCNC: 16.5 G/DL (ref 12–17)
MCH RBC QN AUTO: 28.9 PG (ref 26.8–34.3)
MCHC RBC AUTO-ENTMCNC: 32.3 G/DL (ref 31.4–37.4)
MCV RBC AUTO: 90 FL (ref 82–98)
PLATELET # BLD AUTO: 274 THOUSANDS/UL (ref 149–390)
PMV BLD AUTO: 9.7 FL (ref 8.9–12.7)
POTASSIUM SERPL-SCNC: 3.7 MMOL/L (ref 3.5–5.3)
PROT SERPL-MCNC: 6.4 G/DL (ref 6.4–8.4)
RBC # BLD AUTO: 5.71 MILLION/UL (ref 3.88–5.62)
RH BLD: NEGATIVE
RH BLD: NEGATIVE
SODIUM SERPL-SCNC: 137 MMOL/L (ref 135–147)
SPECIMEN EXPIRATION DATE: NORMAL
WBC # BLD AUTO: 6.19 THOUSAND/UL (ref 4.31–10.16)

## 2024-02-14 PROCEDURE — 93971 EXTREMITY STUDY: CPT

## 2024-02-14 PROCEDURE — 99233 SBSQ HOSP IP/OBS HIGH 50: CPT | Performed by: THORACIC SURGERY (CARDIOTHORACIC VASCULAR SURGERY)

## 2024-02-14 PROCEDURE — 93880 EXTRACRANIAL BILAT STUDY: CPT | Performed by: SURGERY

## 2024-02-14 PROCEDURE — 86920 COMPATIBILITY TEST SPIN: CPT

## 2024-02-14 PROCEDURE — 86900 BLOOD TYPING SEROLOGIC ABO: CPT | Performed by: PHYSICIAN ASSISTANT

## 2024-02-14 PROCEDURE — 99232 SBSQ HOSP IP/OBS MODERATE 35: CPT | Performed by: STUDENT IN AN ORGANIZED HEALTH CARE EDUCATION/TRAINING PROGRAM

## 2024-02-14 PROCEDURE — 80053 COMPREHEN METABOLIC PANEL: CPT

## 2024-02-14 PROCEDURE — 85027 COMPLETE CBC AUTOMATED: CPT

## 2024-02-14 PROCEDURE — 82948 REAGENT STRIP/BLOOD GLUCOSE: CPT

## 2024-02-14 PROCEDURE — 87081 CULTURE SCREEN ONLY: CPT | Performed by: PHYSICIAN ASSISTANT

## 2024-02-14 PROCEDURE — 93880 EXTRACRANIAL BILAT STUDY: CPT

## 2024-02-14 PROCEDURE — 86901 BLOOD TYPING SEROLOGIC RH(D): CPT | Performed by: PHYSICIAN ASSISTANT

## 2024-02-14 PROCEDURE — 99232 SBSQ HOSP IP/OBS MODERATE 35: CPT | Performed by: INTERNAL MEDICINE

## 2024-02-14 PROCEDURE — 86850 RBC ANTIBODY SCREEN: CPT | Performed by: PHYSICIAN ASSISTANT

## 2024-02-14 RX ORDER — INSULIN LISPRO 100 [IU]/ML
1-5 INJECTION, SOLUTION INTRAVENOUS; SUBCUTANEOUS
Status: DISCONTINUED | OUTPATIENT
Start: 2024-02-14 | End: 2024-02-15

## 2024-02-14 RX ORDER — SPIRONOLACTONE 25 MG/1
25 TABLET ORAL DAILY
Status: DISCONTINUED | OUTPATIENT
Start: 2024-02-14 | End: 2024-02-15

## 2024-02-14 RX ORDER — POTASSIUM CHLORIDE 20 MEQ/1
20 TABLET, EXTENDED RELEASE ORAL ONCE
Status: COMPLETED | OUTPATIENT
Start: 2024-02-14 | End: 2024-02-14

## 2024-02-14 RX ORDER — PHENYLEPHRINE HCL IN 0.9% NACL 1 MG/10 ML
200-2000 SYRINGE (ML) INTRAVENOUS ONCE
Status: CANCELLED | OUTPATIENT
Start: 2024-02-15

## 2024-02-14 RX ORDER — POTASSIUM CHLORIDE 20 MEQ/1
40 TABLET, EXTENDED RELEASE ORAL ONCE
Status: COMPLETED | OUTPATIENT
Start: 2024-02-14 | End: 2024-02-14

## 2024-02-14 RX ORDER — CHLORHEXIDINE GLUCONATE ORAL RINSE 1.2 MG/ML
15 SOLUTION DENTAL EVERY 12 HOURS SCHEDULED
Status: DISCONTINUED | OUTPATIENT
Start: 2024-02-14 | End: 2024-02-15 | Stop reason: HOSPADM

## 2024-02-14 RX ADMIN — METOPROLOL SUCCINATE 25 MG: 25 TABLET, EXTENDED RELEASE ORAL at 08:33

## 2024-02-14 RX ADMIN — POTASSIUM CHLORIDE 40 MEQ: 1500 TABLET, EXTENDED RELEASE ORAL at 08:34

## 2024-02-14 RX ADMIN — SPIRONOLACTONE 25 MG: 25 TABLET ORAL at 10:14

## 2024-02-14 RX ADMIN — HEPARIN SODIUM 5000 UNITS: 5000 INJECTION INTRAVENOUS; SUBCUTANEOUS at 21:16

## 2024-02-14 RX ADMIN — ASPIRIN 81 MG: 81 TABLET, COATED ORAL at 08:34

## 2024-02-14 RX ADMIN — MELATONIN 3 MG: at 21:17

## 2024-02-14 RX ADMIN — CHLORHEXIDINE GLUCONATE 15 ML: 1.2 SOLUTION ORAL at 21:16

## 2024-02-14 RX ADMIN — POTASSIUM CHLORIDE 20 MEQ: 1500 TABLET, EXTENDED RELEASE ORAL at 10:14

## 2024-02-14 RX ADMIN — METOPROLOL SUCCINATE 25 MG: 25 TABLET, EXTENDED RELEASE ORAL at 21:16

## 2024-02-14 RX ADMIN — POLYETHYLENE GLYCOL 3350 17 G: 17 POWDER, FOR SOLUTION ORAL at 08:34

## 2024-02-14 RX ADMIN — ATORVASTATIN CALCIUM 40 MG: 40 TABLET, FILM COATED ORAL at 16:28

## 2024-02-14 RX ADMIN — HEPARIN SODIUM 5000 UNITS: 5000 INJECTION INTRAVENOUS; SUBCUTANEOUS at 06:10

## 2024-02-14 RX ADMIN — HEPARIN SODIUM 5000 UNITS: 5000 INJECTION INTRAVENOUS; SUBCUTANEOUS at 14:27

## 2024-02-14 RX ADMIN — MUPIROCIN 1 APPLICATION: 20 OINTMENT TOPICAL at 21:17

## 2024-02-14 RX ADMIN — LOSARTAN POTASSIUM 25 MG: 25 TABLET, FILM COATED ORAL at 08:33

## 2024-02-14 RX ADMIN — SENNOSIDES 8.6 MG: 8.6 TABLET, FILM COATED ORAL at 08:34

## 2024-02-14 NOTE — PROGRESS NOTES
"    INTERNAL MEDICINE RESIDENCY PROGRESS NOTE     Name: Rohan Don   Age & Sex: 63 y.o. male   MRN: [id number]  Unit/Bed#: [facility] 528-01   Encounter: [id number]  Team: SOD Team A    PATIENT INFORMATION     Name: Rohan Don   Age & Sex: 63 y.o. male   MRN: [id number]  Hospital Stay Days: 5    ASSESSMENT/PLAN     Principal Problem:    Acute systolic congestive heart failure (HCC)  Active Problems:    Coronary artery disease    Prediabetes    DEISI (acute kidney injury) (HCC)    Elevated transaminase level    HTN (hypertension)    Pleural effusion    Severe protein-calorie malnutrition (HCC)    Nodule of upper lobe of right lung    Renal calculi      * Acute systolic congestive heart failure (HCC)  Assessment & Plan  Echo (2/10/24) found LVEF of 20%. +BNP on admission 2788.     Plan:  Heart failure following; appreciate recs  Euvolemic  Continue 40 mg PO Lasix daily  Continue to monitor I/Os and weights  Continue Losartan 25 mg daily and Metoprolol 25 mg BID  Will start Spironolactone 25mg QD prior to discharge  Respiratory protocol, maintain O2 sat >88%  Cardiac diet    Coronary artery disease  Assessment & Plan  Cardiac cath (2/12/24) showed prox LAD and ostial RPDA disease with small-vessel OM1 lesion as well in the prox segment.    Plan:  CT surgery consulted for CABG eval - Will discuss possibility of CABG, LIMA to LAD +/- MVR with patient today  Continue Aspirin 81mg daily and Atorvastatin 40mg daily  SubQ heparin    Renal calculi  Assessment & Plan  CT chest/abd/pelvis (2/12) obtained for CABG pre-op eval revealed an \"bilateral mid renal subcentimeter hyperdense foci, with features suggesting small calyceal diverticula, although calcifications or hyperdense lesions cannot be entirely excluded.\" Patient denies flank pain, hematuria, dysuria, or any other urinary symptoms.    Plan:  Consider renal ultrasound may be useful for further evaluation.  Recommend outpatient follow up    Nodule of upper lobe of right " "lung  Assessment & Plan  CT chest/abd/pelvis (2/12) obtained for CABG pre-op eval revealed an \"irregular non-mass-like density in the right upper lobe measuring 1.4 cm, with morphology suggesting an infectious/inflammatory etiology or scarring. However, malignancy cannot be excluded.\" No smoking history. 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    Plan:  Pulmonology consulted; will follow up as outpatient and repeat imaging.     Severe protein-calorie malnutrition (HCC)  Assessment & Plan  Malnutrition Findings:   Adult Malnutrition type: Chronic illness  Adult Degree of Malnutrition: Other severe protein calorie malnutrition  Malnutrition Characteristics: Fat loss, Muscle loss, Inadequate energy, Weight loss, Fluid accumulation            360 Statement: Other severe protein calorie malnutrition related to decreased appetite as evidenced by severe fat and muscle loss, 17.9% weight loss X 5 months. Treated with nutritional supplements    BMI Findings:  Adult BMI Classifications: Underweight < 18.5        Body mass index is 17.13 kg/m².     Plan:  Nutrition consulted - added 1 Van Glucerna at breakfast, Berry Magic Cup at lunch and van Ensure Plus High Protein at dinner as compromise to not increase BG levels but still provide adequate kcal/pro intake.     Pleural effusion  Assessment & Plan  CT chest/abd/pelvis (2/12/24) showed moderate bilateral pleural effusions and bibasilar atelectasis. Likely 2/2 CHF    Plan:  Refer to congestive CHF A&P      HTN (hypertension)  Assessment & Plan  Managed with diet/exercise per patient and previously has been well controlled  BP on admission largely normotensive    Plan:  Currently on Losartan 25 mg daily and Metoprolol 25 mg BID  Will start Spironolactone 25mg QD prior to discharge  Heart failure following; appreciate recs  Goal normotension and MAP>65 mmHg while diuresing    Elevated transaminase level  Assessment & Plan  Noted on 2/5/24 " labs (4 days prior to admission). Suspect congestive hepatopathy from CHF vs. DILI from 3 weeks of abx vs. rarer etiologies such as hemachromatosis    Plan:  F/U daily CMP  Consider adding viral hepatitis screen  Depending on results of repeat labs, can consider RUQ U/S +/- hepatitis labs for chronic exposures    DEISI (acute kidney injury) (HCC)  Assessment & Plan  Recent Labs     24  0544 24  0505 24  0534   CREATININE 0.88 1.15 0.96   EGFR 92 67 84     Estimated Creatinine Clearance: 63.3 mL/min (by C-G formula based on SCr of 0.96 mg/dL).  Presumably DEISI as Cr >1.0 by greater than 25% per KDIGO  Suspect prerenal due to CHF as supported by abnormal LFTs (hepatic congestion)    Plan:  Continue to monitor kidney function  Avoid nephrotoxins as able  Outpatient BMP follow up in 3+ months to determine if component of CKD present    Prediabetes  Assessment & Plan  Managed w/[employer] in [place], [place] (patient's prior residence) with diet/exercise    A1c is 6.1        Disposition: Continue inpatient management    SUBJECTIVE     Patient seen and examined. No acute events overnight. Patient with no complaints this morning.    OBJECTIVE     Vitals:    24 0240 24 0600 24 0721 24 1018   BP: 101/67  101/67 104/69   BP Location: Right arm  Right arm Right arm   Pulse: 91  96 98   Resp: 18  16 16   Temp: 97.7 °F (36.5 °C)  97.7 °F (36.5 °C) 97.7 °F (36.5 °C)   TempSrc: Oral  Oral Oral   SpO2: 94%  95% 95%   Weight:  56.1 kg (123 lb 10.9 oz)     Height:          Temperature:   Temp (24hrs), Av.7 °F (36.5 °C), Min:97.6 °F (36.4 °C), Max:97.9 °F (36.6 °C)    Temperature: 97.7 °F (36.5 °C)  Intake & Output:  I/O          07 0700  0701   07 0701  02/15 0700    P.O. 1320 1632     Total Intake(mL/kg) 1320 (23.7) 1632 (29.1)     Urine (mL/kg/hr) 3150 (2.4) 1675 (1.2)     Total Output 3150 1675     Net -1830                  Weights:   IBW (Ideal Body Weight):  75.3 kg    Body mass index is 17.25 kg/m².  Weight (last 2 days)       Date/Time Weight    02/14/24 0600 56.1 (123.68)    02/13/24 0532 55.7 (122.8)    02/12/24 1029 58.1 (128.09)    02/12/24 0546 58.1 (128.09)          Physical Exam  Vitals and nursing note reviewed.   Constitutional:       General: He is not in acute distress.     Appearance: He is well-developed and underweight.   HENT:      Head: Normocephalic and atraumatic.   Eyes:      Conjunctiva/sclera: Conjunctivae normal.   Cardiovascular:      Rate and Rhythm: Normal rate and regular rhythm.      Heart sounds: No murmur heard.  Pulmonary:      Effort: Pulmonary effort is normal. No respiratory distress.      Breath sounds: Normal breath sounds.   Abdominal:      Palpations: Abdomen is soft.      Tenderness: There is no abdominal tenderness.   Musculoskeletal:         General: No swelling.      Cervical back: Neck supple.      Right lower leg: No edema.      Left lower leg: No edema.   Skin:     General: Skin is warm and dry.      Capillary Refill: Capillary refill takes less than 2 seconds.   Neurological:      General: No focal deficit present.      Mental Status: He is alert and oriented to person, place, and time. Mental status is at baseline.   Psychiatric:         Mood and Affect: Mood normal.       LABORATORY DATA     Labs: I have personally reviewed pertinent reports.  Results from last 7 days   Lab Units 02/14/24  0534 02/13/24  0505 02/12/24  0544 02/11/24  0748 02/10/24  0517   WBC Thousand/uL 6.19 6.75 6.46 6.68 9.35   HEMOGLOBIN g/dL 16.5 17.4* 15.5 16.0 15.3   HEMATOCRIT % 51.1* 52.5* 49.1 47.3 47.6   PLATELETS Thousands/uL 274 280 266 245 248   NEUTROS PCT %  --   --  65 62 66   MONOS PCT %  --   --  12 12 10   EOS PCT %  --   --  4 5 3      Results from last 7 days   Lab Units 02/14/24  0534 02/13/24  0505 02/12/24  0544   POTASSIUM mmol/L 3.7 3.9 4.2   CHLORIDE mmol/L 102 99 107   CO2 mmol/L 24 32 23   BUN mg/dL 29* 25 21   CREATININE  mg/dL 0.96 1.15 0.88   CALCIUM mg/dL 9.6 9.7 9.4   ALK PHOS U/L 63 60 52   ALT U/L 79* 73* 67*   AST U/L 51* 35 26     Results from last 7 days   Lab Units 02/12/24  0544 02/10/24  0517   MAGNESIUM mg/dL 1.9 2.0     Results from last 7 days   Lab Units 02/12/24  0544   PHOSPHORUS mg/dL 4.5*          Results from last 7 days   Lab Units 02/09/24  1925   LACTIC ACID mmol/L 1.9           IMAGING & DIAGNOSTIC TESTING     Radiology Results: I have personally reviewed pertinent reports.  MRI cardiac  w wo contrast    Result Date: 2/13/2024  Impression: Impression: 1. Severely dilated left ventricle with severely reduced left ventricular systolic function. 2. Moderately dilated right ventricle with moderately reduced right ventricular systolic function. 3. Likely mild mitral regurgitation 4. Bilateral pleural effusions. 5. Subendocardial scarring of the entire inferoseptal, inferior, and inferolateral walls suggestive of moderate myocardial viability in these territories.  The remaining myocardial segments appear completely viable. Workstation performed: [id number]     CT chest abdomen pelvis wo contrast    Result Date: 2/13/2024  Impression: 1. Moderate bilateral pleural effusions and bibasilar atelectasis. 2. Irregular non-mass-like density in the right upper lobe measuring 1.4 cm, with morphology suggesting an infectious/inflammatory etiology or scarring. However, malignancy cannot be excluded. PET/CT versus 3-month follow-up chest CT is recommended. 3. Bilateral mid renal subcentimeter hyperdense foci, with features suggesting small calyceal diverticula, although calcifications or hyperdense lesions cannot be entirely excluded. Renal ultrasound may be useful for further evaluation. The study was marked in EPIC for immediate notification. Workstation performed: [id number]     XR chest pa & lateral    Result Date: 2/9/2024  Impression: Moderate bilateral pleural effusions with bibasilar atelectasis and/or airspace disease  "noted. Workstation performed: [id number]     Other Diagnostic Testing: I have personally reviewed pertinent reports.    ACTIVE MEDICATIONS     Current Facility-Administered Medications   Medication Dose Route Frequency    acetaminophen (TYLENOL) tablet 650 mg  650 mg Oral Q4H PRN    aluminum-magnesium hydroxide-simethicone (MAALOX) oral suspension 30 mL  30 mL Oral Q6H PRN    aspirin (ECOTRIN LOW STRENGTH) EC tablet 81 mg  81 mg Oral Daily    atorvastatin (LIPITOR) tablet 40 mg  40 mg Oral Daily With Dinner    furosemide (LASIX) tablet 40 mg  40 mg Oral Daily    heparin (porcine) subcutaneous injection 5,000 Units  5,000 Units Subcutaneous Q8H JENN    insulin lispro (HumaLOG) 100 units/mL subcutaneous injection 1-5 Units  1-5 Units Subcutaneous TID AC    insulin lispro (HumaLOG) 100 units/mL subcutaneous injection 1-5 Units  1-5 Units Subcutaneous HS    losartan (COZAAR) tablet 25 mg  25 mg Oral Daily    melatonin tablet 3 mg  3 mg Oral HS    metoprolol succinate (TOPROL-XL) 24 hr tablet 25 mg  25 mg Oral BID    ondansetron (ZOFRAN) injection 4 mg  4 mg Intravenous Q6H PRN    oxyCODONE-acetaminophen (PERCOCET) 5-325 mg per tablet 1 tablet  1 tablet Oral Q4H PRN    polyethylene glycol (MIRALAX) packet 17 g  17 g Oral Daily    senna (SENOKOT) tablet 8.6 mg  1 tablet Oral Daily    spironolactone (ALDACTONE) tablet 25 mg  25 mg Oral Daily       VTE Pharmacologic Prophylaxis: Heparin  VTE Mechanical Prophylaxis: sequential compression device    Portions of the record may have been created with voice recognition software.  Occasional wrong word or \"sound a like\" substitutions may have occurred due to the inherent limitations of voice recognition software.  Read the chart carefully and recognize, using context, where substitutions have occurred.  ==  Juana Shah MD  [facility]  Internal Medicine Residency PGY-1  "

## 2024-02-14 NOTE — PLAN OF CARE
Problem: PAIN - ADULT  Goal: Verbalizes/displays adequate comfort level or baseline comfort level  Description: Interventions:  - Encourage patient to monitor pain and request assistance  - Assess pain using appropriate pain scale  - Administer analgesics based on type and severity of pain and evaluate response  - Implement non-pharmacological measures as appropriate and evaluate response  - Consider cultural and social influences on pain and pain management  - Notify physician/advanced practitioner if interventions unsuccessful or patient reports new pain  Outcome: Progressing     Problem: INFECTION - ADULT  Goal: Absence or prevention of progression during hospitalization  Description: INTERVENTIONS:  - Assess and monitor for signs and symptoms of infection  - Monitor lab/diagnostic results  - Monitor all insertion sites, i.e. indwelling lines, tubes, and drains  - Monitor endotracheal if appropriate and nasal secretions for changes in amount and color  - [place] appropriate cooling/warming therapies per order  - Administer medications as ordered  - Instruct and encourage patient and family to use good hand hygiene technique  - Identify and instruct in appropriate isolation precautions for identified infection/condition  Outcome: Progressing  Goal: Absence of fever/infection during neutropenic period  Description: INTERVENTIONS:  - Monitor WBC    Outcome: Progressing     Problem: SAFETY ADULT  Goal: Patient will remain free of falls  Description: INTERVENTIONS:  - Educate patient/family on patient safety including physical limitations  - Instruct patient to call for assistance with activity   - Consult OT/PT to assist with strengthening/mobility   - Keep Call bell within reach  - Keep bed low and locked with side rails adjusted as appropriate  - Keep care items and personal belongings within reach  - Initiate and maintain comfort rounds  - Make Fall Risk Sign visible to staff  - Offer Toileting every  Hours,  in advance of need  - Initiate/Maintain alarm  - Obtain necessary fall risk management equipment:   - Apply yellow socks and bracelet for high fall risk patients  - Consider moving patient to room near nurses station  Outcome: Progressing  Goal: Maintain or return to baseline ADL function  Description: INTERVENTIONS:  -  Assess patient's ability to carry out ADLs; assess patient's baseline for ADL function and identify physical deficits which impact ability to perform ADLs (bathing, care of mouth/teeth, toileting, grooming, dressing, etc.)  - Assess/evaluate cause of self-care deficits   - Assess range of motion  - Assess patient's mobility; develop plan if impaired  - Assess patient's need for assistive devices and provide as appropriate  - Encourage maximum independence but intervene and supervise when necessary  - Involve family in performance of ADLs  - Assess for home care needs following discharge   - Consider OT consult to assist with ADL evaluation and planning for discharge  - Provide patient education as appropriate  Outcome: Progressing  Goal: Maintains/Returns to pre admission functional level  Description: INTERVENTIONS:  - Perform AM-PAC 6 Click Basic Mobility/ Daily Activity assessment daily.  - Set and communicate daily mobility goal to care team and patient/family/caregiver.   - Collaborate with rehabilitation services on mobility goals if consulted  - Perform Range of Motion  times a day.  - Reposition patient every  hours.  - Dangle patient  times a day  - Stand patient  times a day  - Ambulate patient  times a day  - Out of bed to chair  times a day   - Out of bed for meals times a day  - Out of bed for toileting  - Record patient progress and toleration of activity level   Outcome: Progressing     Problem: DISCHARGE PLANNING  Goal: Discharge to home or other facility with appropriate resources  Description: INTERVENTIONS:  - Identify barriers to discharge w/patient and caregiver  - Arrange for  needed discharge resources and transportation as appropriate  - Identify discharge learning needs (meds, wound care, etc.)  - Arrange for interpretive services to assist at discharge as needed  - Refer to Case Management Department for coordinating discharge planning if the patient needs post-hospital services based on physician/advanced practitioner order or complex needs related to functional status, cognitive ability, or social support system  Outcome: Progressing     Problem: Knowledge Deficit  Goal: Patient/family/caregiver demonstrates understanding of disease process, treatment plan, medications, and discharge instructions  Description: Complete learning assessment and assess knowledge base.  Interventions:  - Provide teaching at level of understanding  - Provide teaching via preferred learning methods  Outcome: Progressing     Problem: Nutrition/Hydration-ADULT  Goal: Nutrient/Hydration intake appropriate for improving, restoring or maintaining nutritional needs  Description: Monitor and assess patient's nutrition/hydration status for malnutrition. Collaborate with interdisciplinary team and initiate plan and interventions as ordered.  Monitor patient's weight and dietary intake as ordered or per policy. Utilize nutrition screening tool and intervene as necessary. Determine patient's food preferences and provide high-protein, high-caloric foods as appropriate.     INTERVENTIONS:  - Monitor oral intake, urinary output, labs, and treatment plans  - Assess nutrition and hydration status and recommend course of action  - Evaluate amount of meals eaten  - Assist patient with eating if necessary   - Allow adequate time for meals  - Recommend/ encourage appropriate diets, oral nutritional supplements, and vitamin/mineral supplements  - Order, calculate, and assess calorie counts as needed  - Recommend, monitor, and adjust tube feedings and TPN/PPN based on assessed needs  - Assess need for intravenous fluids  -  Provide specific nutrition/hydration education as appropriate  - Include patient/family/caregiver in decisions related to nutrition  Outcome: Progressing

## 2024-02-14 NOTE — PROGRESS NOTES
Progress Note - Cardiothoracic Surgery   Rohan Don 63 y.o. male MRN: [id number]  Unit/Bed#: [facility] 528-01 Encounter: [id number]      24 Hour Events: Doing well no complaints    Medications:   Scheduled Meds:  Current Facility-Administered Medications   Medication Dose Route Frequency Provider Last Rate    acetaminophen  650 mg Oral Q4H PRN Daniel Esteban MD      aluminum-magnesium hydroxide-simethicone  30 mL Oral Q6H PRN Daniel Esteban MD      aspirin  81 mg Oral Daily Jcarlos Yanes MD      atorvastatin  40 mg Oral Daily With Dinner Jcarlos Yanes MD      furosemide  40 mg Oral Daily Jcarlos Yanes MD      heparin (porcine)  5,000 Units Subcutaneous Q8H JENN Daniel Esteban MD      insulin lispro  1-5 Units Subcutaneous TID AC Chapito Rincon MD      insulin lispro  1-5 Units Subcutaneous HS Chapito Rincon MD      losartan  25 mg Oral Daily Daniel Esteban MD      melatonin  3 mg Oral HS Daniel Esteban MD      metoprolol succinate  25 mg Oral BID Jcarlos Yanes MD      ondansetron  4 mg Intravenous Q6H PRN Daniel Esteban MD      oxyCODONE-acetaminophen  1 tablet Oral Q4H PRN Daniel Esteban MD      polyethylene glycol  17 g Oral Daily Daniel Esteban MD      potassium chloride  40 mEq Oral Once Juana Shah MD      Followed by    potassium chloride  20 mEq Oral Once Juana Shah MD      senna  1 tablet Oral Daily Daniel Esteban MD       Continuous Infusions:     Results:   Results from last 7 days   Lab Units 02/14/24  0534 02/13/24  0505 02/12/24  0544   WBC Thousand/uL 6.19 6.75 6.46   HEMOGLOBIN g/dL 16.5 17.4* 15.5   HEMATOCRIT % 51.1* 52.5* 49.1   PLATELETS Thousands/uL 274 280 266     Results from last 7 days   Lab Units 02/14/24  0534 02/13/24  0505 02/12/24  0544   POTASSIUM mmol/L 3.7 3.9 4.2   CHLORIDE mmol/L 102 99 107   CO2 mmol/L 24 32 23   BUN mg/dL 29* 25 21   CREATININE mg/dL 0.96 1.15 0.88   CALCIUM mg/dL 9.6 9.7 9.4           Studies:     Cardiac Catheterization:   Left Anterior  Descending   There is severe diffuse disease throughout the vessel.   Prox LAD to Mid LAD lesion is 85% stenosed. Culprit lesion. Culprit for clinical presentation.BRETT flow is 3.      Left Circumflex      First Obtuse Marginal Branch   The vessel is small. There is severe diffuse disease throughout the vessel.   1st Mrg lesion is 95% stenosed. BRETT flow is 2.      Right Coronary Artery   There is severe diffuse disease throughout the vessel.   Dist RCA lesion is 99% stenosed. Culprit lesion. Culprit for clinical presentation.BRETT flow is 1.      Right Posterior Descending Artery   Collaterals   RPDA filled by collaterals from 2nd Sept.            Echocardiogram:   Findings          Left Ventricle Left ventricular cavity size is mildly dilated. Wall thickness is normal. The left ventricular ejection fraction is 20%. Systolic function is severely reduced. The cardiac output is 2.04 L/min and the cardiac index is 1.13 L/min/m2.     There is severe global hypokinesis. Diastolic function is moderately abnormal, consistent with grade II (pseudonormal) relaxation.   Right Ventricle Right ventricular cavity size is normal. Systolic function is moderately reduced. Wall thickness is normal.   Left Atrium The atrium is severely dilated (>48 mL/m2).   Right Atrium The atrium is dilated.   Aortic Valve The aortic valve is trileaflet. The leaflets are not thickened. The leaflets are not calcified. The leaflets exhibit normal mobility. There is no evidence of regurgitation. The aortic valve has no significant stenosis.   Mitral Valve Mitral valve structure is normal.  There is moderate to severe regurgitation with a posteriorly directed jet. There is no evidence of stenosis.   Tricuspid Valve Tricuspid valve structure is normal. There is mild regurgitation. There is no evidence of stenosis. The right ventricular systolic pressure is moderately elevated. The estimated right ventricular systolic pressure is 52.00 mmHg.   Pulmonic  Valve Pulmonic valve structure is normal. There is mild regurgitation. There is no evidence of stenosis.   Ascending Aorta The aortic root is normal in size.   IVC/SVC The right atrial pressure is estimated at 15.0 mmHg. The inferior vena cava is dilated. Respirophasic changes in dimension were absent.   Pericardium There is no pericardial effusion. The pericardium is normal in appearance. There is a large left pleural effusion.        Cardiac MRI:  IMPRESSION:  Impression:  1. Severely dilated left ventricle with severely reduced left ventricular systolic function.  2. Moderately dilated right ventricle with moderately reduced right ventricular systolic function.  3. Likely mild mitral regurgitation  4. Bilateral pleural effusions.  5. Subendocardial scarring of the entire inferoseptal, inferior, and inferolateral walls suggestive of moderate myocardial viability in these territories.  The remaining myocardial segments appear completely viable.       Carotid artery duplex:   pending    Greater saphenous vein mapping: pending    CT C/A/P:  FINDINGS:     CHEST     LUNGS: There is a 1.4 cm irregular non-mass-like density in the right upper lobe (series 302 image 45) bibasilar atelectasis. No tracheal or endobronchial lesion.     PLEURA: Moderate bilateral pleural effusions.     HEART/GREAT VESSELS: Mild aortic aortic and moderate coronary artery calcification. Mild cardiomegaly. No thoracic aortic aneurysm.     MEDIASTINUM AND STEFANIE: Unremarkable.     CHEST WALL AND LOWER NECK: Unremarkable.     ABDOMEN     LIVER/BILIARY TREE: Subcentimeter hypoattenuating lesion(s), too small to characterize but statistically likely benign, which do not require follow-up (ACR White Paper 2017). No suspicious mass. Normal hepatic contours. No biliary dilation.     GALLBLADDER: No calcified gallstones. No pericholecystic inflammatory change.     SPLEEN: Unremarkable.     PANCREAS: Unremarkable.     ADRENAL GLANDS: Unremarkable.      KIDNEYS/URETERS: Left renal lower pole cyst. Left mid renal cortical calcification versus hyperdense or enhancing focus. Right anterior mid renal hyperdense versus enhancing focus. These findings may represent calcifications, hyperdense lesions or   calyceal diverticula, especially on the right where there is a thin hyperdense connection between this focus and the renal collecting system, which is enhancing due to residual contrast from recent cardiac catheterization. No hydronephrosis.     STOMACH AND BOWEL: Unremarkable.     APPENDIX: Surgically absent.     ABDOMINOPELVIC CAVITY: No ascites. No pneumoperitoneum. No lymphadenopathy.     VESSELS: Atherosclerosis without abdominal aortic aneurysm.     PELVIS     REPRODUCTIVE ORGANS: Unremarkable for patient's age.     URINARY BLADDER: Unremarkable.     ABDOMINAL WALL/INGUINAL REGIONS: Unremarkable.     BONES: No acute fracture or suspicious osseous lesion. Pseudoarticulation between the left anterior first and second ribs.     IMPRESSION:     1. Moderate bilateral pleural effusions and bibasilar atelectasis.     2. Irregular non-mass-like density in the right upper lobe measuring 1.4 cm, with morphology suggesting an infectious/inflammatory etiology or scarring. However, malignancy cannot be excluded. PET/CT versus 3-month follow-up chest CT is recommended.     3. Bilateral mid renal subcentimeter hyperdense foci, with features suggesting small calyceal diverticula, although calcifications or hyperdense lesions cannot be entirely excluded. Renal ultrasound may be useful for further evaluation.    Vitals:   Vitals:    02/13/24 2326 02/14/24 0240 02/14/24 0600 02/14/24 0721   BP: 106/73 101/67  101/67   BP Location: Right arm Right arm     Pulse: (!) 106 91  96   Resp: 16 18     Temp: 97.6 °F (36.4 °C) 97.7 °F (36.5 °C)  97.7 °F (36.5 °C)   TempSrc: Oral Oral     SpO2: 95% 94%  95%   Weight:   56.1 kg (123 lb 10.9 oz)    Height:           Physical Exam:    General:  No acute distress, Alert, and Normal appearance  HEENT/NECK:  Normocephalic. Atraumatic.    Cardiac: Regular rate and rhythm  Pulmonary:  Breath sounds clear bilaterally  Neuro: Alert and oriented X 3, Sensation is grossly intact, and No focal deficits        Assessment:    Severe coronary artery disease; Ongoing CABG workup  Moderate to Severe MR, MVR workup    Plan:  CT C/A/P shows access for IABP.   Cardiac MRI shows Subendocardial scarring of the entire inferoseptal, inferior, and inferolateral walls suggestive of moderate myocardial viability in these territories.  The remaining myocardial segments appear completely viable. Will discuss with attending possibility of CABG, LIMA to LAD  +/- MVR, MR appears mild on cMRI  Dr Shaw to speak with patient today       SIGNATURE: Araceli Perera PA-C  DATE: February 14, 2024  TIME: 7:42 AM    * This note was completed in part utilizing [employer] direct voice recognition software.   Grammatical errors, random word insertion, spelling mistakes, and incomplete sentences may be an occasional consequence of the system secondary to software limitations, ambient noise and hardware issues. At the time of dictation, efforts were made to edit, clarify and /or correct errors. Please read the chart carefully and recognize, using context, where substitutions have occurred.  If you have any questions or concerns about the context, text or information contained within the body of this dictation, please contact myself, the provider, for further clarification.

## 2024-02-14 NOTE — ANESTHESIA PREPROCEDURE EVALUATION
Procedure:  CORONARY ARTERY BYPASS GRAFT (CABG) X 2 VESSELS, POSSIBLE MITRAL RING (Chest)  INSERTION INTRA BALLOON PUMP AORTIC (IABP) (Chest)    Relevant Problems   CARDIO   (+) Acute systolic congestive heart failure (HCC)   (+) Coronary artery disease   (+) HTN (hypertension)      /RENAL   (+) DEISI (acute kidney injury) (HCC)   (+) Renal calculi      PULMONARY   (+) Pleural effusion      LHC: pLAD 85, OM1 95, dRCA 99    TTE: LV EF 20 (global HK), RV moderate systolic dysfunction, mod-sev MR, mild TR (RVSP 52 mmHg)    Cr 0.96, hgb 16.5, plt 274  A1c 6.1    B/L ICA < 50       Anesthesia Plan  ASA Score- 4     Anesthesia Type- general with ASA Monitors.         Additional Monitors: pulmonary artery catheter, central venous line and arterial line.    Airway Plan: ETT.    Comment:   General anesthesia, endotracheal Intubation. Standard ASA monitors. Large bore peripheral IV. Pre-induction arterial line. CVP (Triple Lumen) and Cordis with PAC. JUAN for perioperative monitoring and diagnosis. Post-op ICU/vent. Risks and benefits discussed with patient; patient consented and agrees to proceed.  Possible IABP  CCO swan.       Plan Factors-    Chart reviewed.   Existing labs reviewed.                   Induction- intravenous.    Postoperative Plan-     Informed Consent- Anesthetic plan and risks discussed with patient.

## 2024-02-14 NOTE — PROGRESS NOTES
"Advanced Heart Failure Team Progress Note - Rohan Don 63 y.o. male MRN: [id number]    Unit/Bed#: [facility] 528-01 Encounter: [id number]    Subjective:   Patient seen and examined. No events overnight. Reports breathing continues to improve and feels more at baseline. Able to ambulate without shortness of breath. No chest pain.       Vitals: Blood pressure 104/69, pulse 98, temperature 97.7 °F (36.5 °C), temperature source Oral, resp. rate 16, height 5' 11\" (1.803 m), weight 56.1 kg (123 lb 10.9 oz), SpO2 95%., Body mass index is 17.25 kg/m².,   Orthostatic Blood Pressures      Flowsheet Row Most Recent Value   Blood Pressure 104/69 filed at 02/14/2024 1018   Patient Position - Orthostatic VS Lying filed at 02/14/2024 1018              Intake/Output Summary (Last 24 hours) at 2/14/2024 1244  Last data filed at 2/14/2024 0847  Gross per 24 hour   Intake 1382 ml   Output 1525 ml   Net -143 ml       Review of System:  Review of system was conducted and was negative except for as stated in the subjective course.      Physical Exam  Constitutional:       General: He is not in acute distress.  Cardiovascular:      Rate and Rhythm: Normal rate and regular rhythm.      Heart sounds: No murmur heard.  Pulmonary:      Effort: Pulmonary effort is normal.   Musculoskeletal:      Right lower leg: No edema.      Left lower leg: No edema.   Skin:     General: Skin is warm.   Neurological:      Mental Status: He is alert.          Current Facility-Administered Medications:     acetaminophen (TYLENOL) tablet 650 mg, 650 mg, Oral, Q4H PRN, Daniel Esteban MD    aluminum-magnesium hydroxide-simethicone (MAALOX) oral suspension 30 mL, 30 mL, Oral, Q6H PRN, Daniel Esteban MD    aspirin (ECOTRIN LOW STRENGTH) EC tablet 81 mg, 81 mg, Oral, Daily, Jcarlos Yanes MD, 81 mg at 02/14/24 0834    atorvastatin (LIPITOR) tablet 40 mg, 40 mg, Oral, Daily With Dinner, Jcarlos Yanes MD, 40 mg at 02/13/24 1530    furosemide (LASIX) tablet 40 mg, 40 mg, " Oral, Daily, Jcarlos Yanes MD, 40 mg at 02/13/24 0923    heparin (porcine) subcutaneous injection 5,000 Units, 5,000 Units, Subcutaneous, Q8H JENN, 5,000 Units at 02/14/24 0610 **AND** [CANCELED] Platelet count, , , Once, Camila Joy MD    insulin lispro (HumaLOG) 100 units/mL subcutaneous injection 1-5 Units, 1-5 Units, Subcutaneous, TID AC **AND** Fingerstick Glucose (POCT), , , TID AC, Chapito Rincon MD    insulin lispro (HumaLOG) 100 units/mL subcutaneous injection 1-5 Units, 1-5 Units, Subcutaneous, HS, Chapito Rincon MD    losartan (COZAAR) tablet 25 mg, 25 mg, Oral, Daily, Daniel Esteban MD, 25 mg at 02/14/24 0833    melatonin tablet 3 mg, 3 mg, Oral, HS, Daniel Esteban MD, 3 mg at 02/13/24 2007    metoprolol succinate (TOPROL-XL) 24 hr tablet 25 mg, 25 mg, Oral, BID, Jcarlos Yanes MD, 25 mg at 02/14/24 0833    ondansetron (ZOFRAN) injection 4 mg, 4 mg, Intravenous, Q6H PRN, Daniel Esteban MD    oxyCODONE-acetaminophen (PERCOCET) 5-325 mg per tablet 1 tablet, 1 tablet, Oral, Q4H PRN, Daniel Esteban MD    polyethylene glycol (MIRALAX) packet 17 g, 17 g, Oral, Daily, Daniel Esteban MD, 17 g at 02/14/24 0834    senna (SENOKOT) tablet 8.6 mg, 1 tablet, Oral, Daily, Daniel Esteban MD, 8.6 mg at 02/14/24 0834    spironolactone (ALDACTONE) tablet 25 mg, 25 mg, Oral, Daily, Jcarlos Yanes MD, 25 mg at 02/14/24 1014    Labs & Results:  Results from last 7 days   Lab Units 02/09/24 2023 02/09/24  1815 02/09/24  1613   HS TNI 0HR ng/L  --   --  35   HS TNI 2HR ng/L  --  35  --    HS TNI 4HR ng/L 32  --   --          Results from last 7 days   Lab Units 02/14/24  0534 02/13/24  0505 02/12/24  0544   POTASSIUM mmol/L 3.7 3.9 4.2   CO2 mmol/L 24 32 23   CHLORIDE mmol/L 102 99 107   BUN mg/dL 29* 25 21   CREATININE mg/dL 0.96 1.15 0.88     Results from last 7 days   Lab Units 02/14/24  0534 02/13/24  0505 02/12/24  0544   HEMOGLOBIN g/dL 16.5 17.4* 15.5   HEMATOCRIT % 51.1* 52.5* 49.1   PLATELETS  Thousands/uL 274 280 266     Results from last 7 days   Lab Units 02/09/24  1613   TRIGLYCERIDES mg/dL 96   HDL mg/dL 23*   LDL CALC mg/dL 66   HEMOGLOBIN A1C % 6.1*           Assessment    63 year old male presented with progressive shortness of breath and sent in for admission after significantly reduced LVEF was noted on outpatient echo.   Symptoms improved with IV diuretics. Cardiac catheterization showed severe multivessel CAD, pending CABG evaluation.     Acute Decompensated Heart Failure with reduced ejection fraction (ACC Stage C / NYHA Class III) secondary to ischemic cardiomyopathy     Severe multivessel coronary artery disease    Functional mitral regurgitation    Pre-renal DEISI 2/2 venous congestion, resolved    Hypertension    Alcohol use    On presentation, patient was mildly hypertensive (132/101), and tachycardic. No evidence of infection or anemia.  Serial troponin negative.   BNP 2788  TSH normal with FT4 mildly elevated at 1.4. No symptoms of hyperthyroidism.    Iron studies with Ferritin 121 and Tsat 15%.   No indication for IV iron with Hb > 15.     HbA1c 6.1  Lipid profile HDL 23, LDL 66    ECG: Sinus tachycardia with left atrial enlargement, right axis deviation, PRWP, and non-specific T wave changes      Echocardiogram (2/10): Severe biventricular dysfunction with LVEF 15-20%. LV globally hypokinetic. Grade 2 diastolic dysfunction. Severe posteriorly directed MR    LHC performed 2/12:   -Right dominant circulation   -Focal obstructive disease in RCA, rPDA, mid-LAD, and pLCx  LVEDP: 22 mmHg    STS Risk Score: 2.6% operative mortality    Cardiac MRI reviewed, showing subendocardial LGE uptake ~30% thickness in inferior-inferolateral wall, suggesting moderate viability. Other segments appear fully viable. MR severity appears mild after diuresis.     Neurohormonal Blockade:  --Beta-Blocker: Metoprolol Succinate 25 mg BID    --ACEi, ARB or ARNi: Losartan 25mg QD for now, will switch to ARNi  outpatient after insurance activation    --Aldosterone Receptor Blocker: Spironolactone 25mg daily    --SGLT2i: Will add after insurance activation as outpatient    --Diuretic: Furosemide PO 40mg Daily  .  Weight 137 lb (initial) -> 128 lb -> 122 lb    Sudden Cardiac Death Risk Reduction:  --ICD: Will consider implantation after 3 months of GDMT if EF remains < 35%  --Discussed interim LifeVest, patient is agreeable.     Electrical Resynchronization:  --Candidacy for BiV device: Narrow QRS    Advanced Therapies: Possible candidate for LVAD / transplant based on outcome of surgery    Plan:  -Continue CT surgery evaluations for possible CABG. May not require MV repair due to improvement in MR with diuresis.  -Discussed potential need for mechanical BiV support post-operatively, possibly with bridge to transplant.  -Aspirin 81mg daily  -Atorvastatin 40mg daily  -Continue PO furosemide 40mg daily   -Continue GDMT as above    Jcarlos Yanes MD  Cardiology Fellow

## 2024-02-15 ENCOUNTER — ANESTHESIA (INPATIENT)
Dept: PERIOP | Facility: HOSPITAL | Age: 64
DRG: 163 | End: 2024-02-15
Payer: COMMERCIAL

## 2024-02-15 ENCOUNTER — PATIENT OUTREACH (OUTPATIENT)
Dept: CARDIOLOGY CLINIC | Facility: CLINIC | Age: 64
End: 2024-02-15

## 2024-02-15 ENCOUNTER — APPOINTMENT (INPATIENT)
Dept: NON INVASIVE DIAGNOSTICS | Facility: HOSPITAL | Age: 64
DRG: 163 | End: 2024-02-15
Attending: THORACIC SURGERY (CARDIOTHORACIC VASCULAR SURGERY)
Payer: COMMERCIAL

## 2024-02-15 ENCOUNTER — APPOINTMENT (INPATIENT)
Dept: RADIOLOGY | Facility: HOSPITAL | Age: 64
DRG: 163 | End: 2024-02-15
Payer: COMMERCIAL

## 2024-02-15 PROBLEM — Z95.1 S/P CABG X 2: Status: ACTIVE | Noted: 2024-02-15

## 2024-02-15 PROBLEM — Z98.890 S/P MVR (MITRAL VALVE REPAIR): Status: ACTIVE | Noted: 2024-02-15

## 2024-02-15 PROBLEM — I51.9 SEVERE LEFT VENTRICULAR SYSTOLIC DYSFUNCTION: Status: ACTIVE | Noted: 2024-02-15

## 2024-02-15 PROBLEM — I25.5 ISCHEMIC CARDIOMYOPATHY: Status: ACTIVE | Noted: 2024-02-15

## 2024-02-15 LAB
ALBUMIN SERPL BCP-MCNC: 3.8 G/DL (ref 3.5–5)
ALP SERPL-CCNC: 62 U/L (ref 34–104)
ALT SERPL W P-5'-P-CCNC: 81 U/L (ref 7–52)
ANION GAP SERPL CALCULATED.3IONS-SCNC: 10 MMOL/L
ANION GAP SERPL CALCULATED.3IONS-SCNC: 9 MMOL/L
APTT PPP: 30 SECONDS (ref 23–37)
ARTERIAL PATENCY WRIST A: NO
ARTERIAL PATENCY WRIST A: NO
AST SERPL W P-5'-P-CCNC: 47 U/L (ref 13–39)
BASE EX.OXY STD BLDV CALC-SCNC: 72 % (ref 60–80)
BASE EX.OXY STD BLDV CALC-SCNC: 83.6 % (ref 60–80)
BASE EXCESS BLDA CALC-SCNC: -1 MMOL/L (ref -2–3)
BASE EXCESS BLDA CALC-SCNC: -1 MMOL/L (ref -2–3)
BASE EXCESS BLDA CALC-SCNC: -5 MMOL/L (ref -2–3)
BASE EXCESS BLDA CALC-SCNC: -6 MMOL/L (ref -2–3)
BASE EXCESS BLDA CALC-SCNC: -6 MMOL/L (ref -2–3)
BASE EXCESS BLDA CALC-SCNC: -7 MMOL/L (ref -2–3)
BASE EXCESS BLDA CALC-SCNC: -8 MMOL/L (ref -2–3)
BASE EXCESS BLDA CALC-SCNC: 3 MMOL/L (ref -2–3)
BASE EXCESS BLDV CALC-SCNC: -1.2 MMOL/L
BASE EXCESS BLDV CALC-SCNC: -3.1 MMOL/L
BILIRUB SERPL-MCNC: 0.86 MG/DL (ref 0.2–1)
BODY TEMPERATURE: 100.4 DEGREES FEHRENHEIT
BODY TEMPERATURE: 98 DEGREES FEHRENHEIT
BUN SERPL-MCNC: 21 MG/DL (ref 5–25)
BUN SERPL-MCNC: 25 MG/DL (ref 5–25)
CA-I BLD-SCNC: 1.1 MMOL/L (ref 1.12–1.32)
CA-I BLD-SCNC: 1.12 MMOL/L (ref 1.12–1.32)
CA-I BLD-SCNC: 1.12 MMOL/L (ref 1.12–1.32)
CA-I BLD-SCNC: 1.15 MMOL/L (ref 1.12–1.32)
CA-I BLD-SCNC: 1.24 MMOL/L (ref 1.12–1.32)
CA-I BLD-SCNC: 1.28 MMOL/L (ref 1.12–1.32)
CA-I BLD-SCNC: 1.33 MMOL/L (ref 1.12–1.32)
CA-I BLD-SCNC: 1.34 MMOL/L (ref 1.12–1.32)
CA-I BLD-SCNC: 1.5 MMOL/L (ref 1.12–1.32)
CA-I BLD-SCNC: 1.6 MMOL/L (ref 1.12–1.32)
CALCIUM SERPL-MCNC: 8.8 MG/DL (ref 8.4–10.2)
CALCIUM SERPL-MCNC: 9.5 MG/DL (ref 8.4–10.2)
CHLORIDE SERPL-SCNC: 105 MMOL/L (ref 96–108)
CHLORIDE SERPL-SCNC: 113 MMOL/L (ref 96–108)
CO2 SERPL-SCNC: 20 MMOL/L (ref 21–32)
CO2 SERPL-SCNC: 21 MMOL/L (ref 21–32)
CREAT SERPL-MCNC: 0.9 MG/DL (ref 0.6–1.3)
CREAT SERPL-MCNC: 0.92 MG/DL (ref 0.6–1.3)
ERYTHROCYTE [DISTWIDTH] IN BLOOD BY AUTOMATED COUNT: 13.7 % (ref 11.6–15.1)
FIBRINOGEN PPP-MCNC: 233 MG/DL (ref 207–520)
GFR SERPL CREATININE-BSD FRML MDRD: 88 ML/MIN/1.73SQ M
GFR SERPL CREATININE-BSD FRML MDRD: 91 ML/MIN/1.73SQ M
GLUCOSE SERPL-MCNC: 100 MG/DL (ref 65–140)
GLUCOSE SERPL-MCNC: 100 MG/DL (ref 65–140)
GLUCOSE SERPL-MCNC: 101 MG/DL (ref 65–140)
GLUCOSE SERPL-MCNC: 114 MG/DL (ref 65–140)
GLUCOSE SERPL-MCNC: 125 MG/DL (ref 65–140)
GLUCOSE SERPL-MCNC: 134 MG/DL (ref 65–140)
GLUCOSE SERPL-MCNC: 156 MG/DL (ref 65–140)
GLUCOSE SERPL-MCNC: 158 MG/DL (ref 65–140)
GLUCOSE SERPL-MCNC: 160 MG/DL (ref 65–140)
GLUCOSE SERPL-MCNC: 165 MG/DL (ref 65–140)
GLUCOSE SERPL-MCNC: 178 MG/DL (ref 65–140)
GLUCOSE SERPL-MCNC: 220 MG/DL (ref 65–140)
GLUCOSE SERPL-MCNC: 226 MG/DL (ref 65–140)
GLUCOSE SERPL-MCNC: 233 MG/DL (ref 65–140)
GLUCOSE SERPL-MCNC: 238 MG/DL (ref 65–140)
GLUCOSE SERPL-MCNC: 239 MG/DL (ref 65–140)
GLUCOSE SERPL-MCNC: 246 MG/DL (ref 65–140)
GLUCOSE SERPL-MCNC: 247 MG/DL (ref 65–140)
GLUCOSE SERPL-MCNC: 84 MG/DL (ref 65–140)
HCO3 BLDA-SCNC: 19.4 MMOL/L (ref 22–28)
HCO3 BLDA-SCNC: 19.8 MMOL/L (ref 22–28)
HCO3 BLDA-SCNC: 20.3 MMOL/L (ref 22–28)
HCO3 BLDA-SCNC: 20.4 MMOL/L (ref 22–28)
HCO3 BLDA-SCNC: 21.4 MMOL/L (ref 24–30)
HCO3 BLDA-SCNC: 23.1 MMOL/L (ref 24–30)
HCO3 BLDA-SCNC: 23.7 MMOL/L (ref 24–30)
HCO3 BLDA-SCNC: 24.2 MMOL/L (ref 22–28)
HCO3 BLDA-SCNC: 25.8 MMOL/L (ref 22–28)
HCO3 BLDA-SCNC: 30 MMOL/L (ref 22–28)
HCO3 BLDV-SCNC: 24.5 MMOL/L (ref 24–30)
HCO3 BLDV-SCNC: 24.6 MMOL/L (ref 24–30)
HCT VFR BLD AUTO: 39.4 % (ref 36.5–49.3)
HCT VFR BLD AUTO: 40.2 % (ref 36.5–49.3)
HCT VFR BLD AUTO: 53.1 % (ref 36.5–49.3)
HCT VFR BLD CALC: 29 % (ref 36.5–49.3)
HCT VFR BLD CALC: 33 % (ref 36.5–49.3)
HCT VFR BLD CALC: 34 % (ref 36.5–49.3)
HCT VFR BLD CALC: 35 % (ref 36.5–49.3)
HCT VFR BLD CALC: 35 % (ref 36.5–49.3)
HCT VFR BLD CALC: 43 % (ref 36.5–49.3)
HCT VFR BLD CALC: 45 % (ref 36.5–49.3)
HCT VFR BLD CALC: 49 % (ref 36.5–49.3)
HGB BLD-MCNC: 12.2 G/DL (ref 12–17)
HGB BLD-MCNC: 12.9 G/DL (ref 12–17)
HGB BLD-MCNC: 12.9 G/DL (ref 12–17)
HGB BLD-MCNC: 13 G/DL (ref 12–17)
HGB BLD-MCNC: 16.7 G/DL (ref 12–17)
HGB BLDA-MCNC: 11.2 G/DL (ref 12–17)
HGB BLDA-MCNC: 11.6 G/DL (ref 12–17)
HGB BLDA-MCNC: 11.9 G/DL (ref 12–17)
HGB BLDA-MCNC: 11.9 G/DL (ref 12–17)
HGB BLDA-MCNC: 14.6 G/DL (ref 12–17)
HGB BLDA-MCNC: 15.3 G/DL (ref 12–17)
HGB BLDA-MCNC: 16.7 G/DL (ref 12–17)
HGB BLDA-MCNC: 9.9 G/DL (ref 12–17)
HOROWITZ INDEX BLDA+IHG-RTO: 40 MM[HG]
INR PPP: 1.45 (ref 0.84–1.19)
KCT BLD-ACNC: 126 SEC (ref 89–137)
KCT BLD-ACNC: 143 SEC (ref 89–137)
KCT BLD-ACNC: 454 SEC (ref 89–137)
KCT BLD-ACNC: 498 SEC (ref 89–137)
KCT BLD-ACNC: 528 SEC (ref 89–137)
KCT BLD-ACNC: 539 SEC (ref 89–137)
KCT BLD-ACNC: 562 SEC (ref 89–137)
MCH RBC QN AUTO: 28.6 PG (ref 26.8–34.3)
MCHC RBC AUTO-ENTMCNC: 31.5 G/DL (ref 31.4–37.4)
MCV RBC AUTO: 91 FL (ref 82–98)
MRSA NOSE QL CULT: NORMAL
O2 CT BLDV-SCNC: 14.1 ML/DL
O2 CT BLDV-SCNC: 16.1 ML/DL
PCO2 BLD: 21 MMOL/L (ref 21–32)
PCO2 BLD: 21 MMOL/L (ref 21–32)
PCO2 BLD: 22 MMOL/L (ref 21–32)
PCO2 BLD: 22 MMOL/L (ref 21–32)
PCO2 BLD: 23 MMOL/L (ref 21–32)
PCO2 BLD: 25 MMOL/L (ref 21–32)
PCO2 BLD: 25 MMOL/L (ref 21–32)
PCO2 BLD: 26 MMOL/L (ref 21–32)
PCO2 BLD: 27 MMOL/L (ref 21–32)
PCO2 BLD: 32 MMOL/L (ref 21–32)
PCO2 BLD: 38.8 MM HG (ref 36–44)
PCO2 BLD: 43.3 MM HG (ref 36–44)
PCO2 BLD: 44.2 MM HG (ref 36–44)
PCO2 BLD: 45.9 MM HG (ref 36–44)
PCO2 BLD: 46.6 MM HG (ref 42–50)
PCO2 BLD: 48.6 MM HG (ref 36–44)
PCO2 BLD: 54.8 MM HG (ref 42–50)
PCO2 BLD: 54.9 MM HG (ref 36–44)
PCO2 BLD: 56.1 MM HG (ref 42–50)
PCO2 BLD: 57 MM HG (ref 42–50)
PCO2 BLD: 58.7 MM HG (ref 36–44)
PCO2 BLD: 66.3 MM HG (ref 42–50)
PCO2 BLDV: 44.6 MM HG (ref 42–50)
PCO2 BLDV: 55.5 MM HG (ref 42–50)
PEEP RESPIRATORY: 6 CM[H2O]
PH BLD: 7.15 [PH] (ref 7.3–7.4)
PH BLD: 7.19 [PH] (ref 7.3–7.4)
PH BLD: 7.23 [PH] (ref 7.35–7.45)
PH BLD: 7.23 [PH] (ref 7.35–7.45)
PH BLD: 7.23 [PH] (ref 7.3–7.4)
PH BLD: 7.27 [PH] (ref 7.35–7.45)
PH BLD: 7.27 [PH] (ref 7.3–7.4)
PH BLD: 7.28 [PH] (ref 7.35–7.45)
PH BLD: 7.32 [PH] (ref 7.35–7.45)
PH BLD: 7.32 [PH] (ref 7.35–7.45)
PH BLD: 7.34 [PH] (ref 7.3–7.4)
PH BLD: 7.36 [PH] (ref 7.35–7.45)
PH BLDV: 7.26 [PH] (ref 7.3–7.4)
PH BLDV: 7.36 [PH] (ref 7.3–7.4)
PLATELET # BLD AUTO: 151 THOUSANDS/UL (ref 149–390)
PLATELET # BLD AUTO: 206 THOUSANDS/UL (ref 149–390)
PLATELET # BLD AUTO: 281 THOUSANDS/UL (ref 149–390)
PMV BLD AUTO: 9.6 FL (ref 8.9–12.7)
PO2 BLD: 288 MM HG (ref 75–129)
PO2 BLD: 304 MM HG (ref 75–129)
PO2 BLD: 363 MM HG (ref 75–129)
PO2 BLD: 377 MM HG (ref 75–129)
PO2 BLD: 394 MM HG (ref 75–129)
PO2 BLD: 51 MM HG (ref 35–45)
PO2 BLD: 55 MM HG (ref 35–45)
PO2 BLD: 78 MM HG (ref 35–45)
PO2 BLD: >400 MM HG (ref 75–129)
PO2 BLD: >400 MM HG (ref 75–129)
PO2 BLDV: 40.8 MM HG (ref 35–45)
PO2 BLDV: 58.3 MM HG (ref 35–45)
PO2 VENOUS TEMP CORRECTED: 43.8 MM HG (ref 35–45)
PO2 VENOUS TEMP CORRECTED: 57.1 MM HG (ref 35–45)
POTASSIUM BLD-SCNC: 3.6 MMOL/L (ref 3.5–5.3)
POTASSIUM BLD-SCNC: 3.6 MMOL/L (ref 3.5–5.3)
POTASSIUM BLD-SCNC: 4.3 MMOL/L (ref 3.5–5.3)
POTASSIUM BLD-SCNC: 4.4 MMOL/L (ref 3.5–5.3)
POTASSIUM BLD-SCNC: 4.4 MMOL/L (ref 3.5–5.3)
POTASSIUM BLD-SCNC: 4.5 MMOL/L (ref 3.5–5.3)
POTASSIUM BLD-SCNC: 4.7 MMOL/L (ref 3.5–5.3)
POTASSIUM BLD-SCNC: 4.7 MMOL/L (ref 3.5–5.3)
POTASSIUM BLD-SCNC: 5.1 MMOL/L (ref 3.5–5.3)
POTASSIUM BLD-SCNC: 5.2 MMOL/L (ref 3.5–5.3)
POTASSIUM SERPL-SCNC: 3.6 MMOL/L (ref 3.5–5.3)
POTASSIUM SERPL-SCNC: 4.3 MMOL/L (ref 3.5–5.3)
POTASSIUM SERPL-SCNC: 4.5 MMOL/L (ref 3.5–5.3)
POTASSIUM SERPL-SCNC: 5.4 MMOL/L (ref 3.5–5.3)
PROT SERPL-MCNC: 6.5 G/DL (ref 6.4–8.4)
PROTHROMBIN TIME: 17.4 SECONDS (ref 11.6–14.5)
RBC # BLD AUTO: 5.83 MILLION/UL (ref 3.88–5.62)
SAO2 % BLD FROM PO2: 100 % (ref 60–85)
SAO2 % BLD FROM PO2: 78 % (ref 60–85)
SAO2 % BLD FROM PO2: 80 % (ref 60–85)
SAO2 % BLD FROM PO2: 90 % (ref 60–85)
SODIUM BLD-SCNC: 133 MMOL/L (ref 136–145)
SODIUM BLD-SCNC: 136 MMOL/L (ref 136–145)
SODIUM BLD-SCNC: 136 MMOL/L (ref 136–145)
SODIUM BLD-SCNC: 137 MMOL/L (ref 136–145)
SODIUM BLD-SCNC: 137 MMOL/L (ref 136–145)
SODIUM BLD-SCNC: 138 MMOL/L (ref 136–145)
SODIUM BLD-SCNC: 140 MMOL/L (ref 136–145)
SODIUM BLD-SCNC: 140 MMOL/L (ref 136–145)
SODIUM BLD-SCNC: 141 MMOL/L (ref 136–145)
SODIUM BLD-SCNC: 142 MMOL/L (ref 136–145)
SODIUM SERPL-SCNC: 136 MMOL/L (ref 135–147)
SODIUM SERPL-SCNC: 142 MMOL/L (ref 135–147)
SPECIMEN SOURCE: ABNORMAL
SPECIMEN SOURCE: NORMAL
VENT- AC: AC
VT SETTING VENT: 500 ML
WBC # BLD AUTO: 8.66 THOUSAND/UL (ref 4.31–10.16)

## 2024-02-15 PROCEDURE — 02HV33Z INSERTION OF INFUSION DEVICE INTO SUPERIOR VENA CAVA, PERCUTANEOUS APPROACH: ICD-10-PCS | Performed by: ANESTHESIOLOGY

## 2024-02-15 PROCEDURE — 33517 CABG ARTERY-VEIN SINGLE: CPT | Performed by: THORACIC SURGERY (CARDIOTHORACIC VASCULAR SURGERY)

## 2024-02-15 PROCEDURE — 93005 ELECTROCARDIOGRAM TRACING: CPT

## 2024-02-15 PROCEDURE — 85018 HEMOGLOBIN: CPT

## 2024-02-15 PROCEDURE — 85384 FIBRINOGEN ACTIVITY: CPT | Performed by: PHYSICIAN ASSISTANT

## 2024-02-15 PROCEDURE — 5A02210 ASSISTANCE WITH CARDIAC OUTPUT USING BALLOON PUMP, CONTINUOUS: ICD-10-PCS | Performed by: THORACIC SURGERY (CARDIOTHORACIC VASCULAR SURGERY)

## 2024-02-15 PROCEDURE — 82805 BLOOD GASES W/O2 SATURATION: CPT | Performed by: ANESTHESIOLOGY

## 2024-02-15 PROCEDURE — 33426 REPAIR OF MITRAL VALVE: CPT | Performed by: THORACIC SURGERY (CARDIOTHORACIC VASCULAR SURGERY)

## 2024-02-15 PROCEDURE — 02UG0JZ SUPPLEMENT MITRAL VALVE WITH SYNTHETIC SUBSTITUTE, OPEN APPROACH: ICD-10-PCS | Performed by: THORACIC SURGERY (CARDIOTHORACIC VASCULAR SURGERY)

## 2024-02-15 PROCEDURE — 85018 HEMOGLOBIN: CPT | Performed by: ANESTHESIOLOGY

## 2024-02-15 PROCEDURE — 33517 CABG ARTERY-VEIN SINGLE: CPT | Performed by: PHYSICIAN ASSISTANT

## 2024-02-15 PROCEDURE — 5A1221Z PERFORMANCE OF CARDIAC OUTPUT, CONTINUOUS: ICD-10-PCS | Performed by: THORACIC SURGERY (CARDIOTHORACIC VASCULAR SURGERY)

## 2024-02-15 PROCEDURE — 5A1223Z PERFORMANCE OF CARDIAC PACING, CONTINUOUS: ICD-10-PCS | Performed by: THORACIC SURGERY (CARDIOTHORACIC VASCULAR SURGERY)

## 2024-02-15 PROCEDURE — 84132 ASSAY OF SERUM POTASSIUM: CPT

## 2024-02-15 PROCEDURE — NC001 PR NO CHARGE: Performed by: PHYSICIAN ASSISTANT

## 2024-02-15 PROCEDURE — 71045 X-RAY EXAM CHEST 1 VIEW: CPT

## 2024-02-15 PROCEDURE — 33967 INSERT I-AORT PERCUT DEVICE: CPT | Performed by: PHYSICIAN ASSISTANT

## 2024-02-15 PROCEDURE — 80053 COMPREHEN METABOLIC PANEL: CPT

## 2024-02-15 PROCEDURE — 85018 HEMOGLOBIN: CPT | Performed by: PHYSICIAN ASSISTANT

## 2024-02-15 PROCEDURE — 84295 ASSAY OF SERUM SODIUM: CPT

## 2024-02-15 PROCEDURE — 80048 BASIC METABOLIC PNL TOTAL CA: CPT | Performed by: PHYSICIAN ASSISTANT

## 2024-02-15 PROCEDURE — 99223 1ST HOSP IP/OBS HIGH 75: CPT | Performed by: ANESTHESIOLOGY

## 2024-02-15 PROCEDURE — 33508 ENDOSCOPIC VEIN HARVEST: CPT | Performed by: THORACIC SURGERY (CARDIOTHORACIC VASCULAR SURGERY)

## 2024-02-15 PROCEDURE — 33426 REPAIR OF MITRAL VALVE: CPT | Performed by: PHYSICIAN ASSISTANT

## 2024-02-15 PROCEDURE — 94760 N-INVAS EAR/PLS OXIMETRY 1: CPT

## 2024-02-15 PROCEDURE — 30233N0 TRANSFUSION OF AUTOLOGOUS RED BLOOD CELLS INTO PERIPHERAL VEIN, PERCUTANEOUS APPROACH: ICD-10-PCS | Performed by: THORACIC SURGERY (CARDIOTHORACIC VASCULAR SURGERY)

## 2024-02-15 PROCEDURE — 021009W BYPASS CORONARY ARTERY, ONE ARTERY FROM AORTA WITH AUTOLOGOUS VENOUS TISSUE, OPEN APPROACH: ICD-10-PCS | Performed by: THORACIC SURGERY (CARDIOTHORACIC VASCULAR SURGERY)

## 2024-02-15 PROCEDURE — 85027 COMPLETE CBC AUTOMATED: CPT

## 2024-02-15 PROCEDURE — 85049 AUTOMATED PLATELET COUNT: CPT | Performed by: THORACIC SURGERY (CARDIOTHORACIC VASCULAR SURGERY)

## 2024-02-15 PROCEDURE — 85610 PROTHROMBIN TIME: CPT | Performed by: PHYSICIAN ASSISTANT

## 2024-02-15 PROCEDURE — 33967 INSERT I-AORT PERCUT DEVICE: CPT | Performed by: THORACIC SURGERY (CARDIOTHORACIC VASCULAR SURGERY)

## 2024-02-15 PROCEDURE — 82803 BLOOD GASES ANY COMBINATION: CPT

## 2024-02-15 PROCEDURE — 94002 VENT MGMT INPAT INIT DAY: CPT

## 2024-02-15 PROCEDURE — C1769 GUIDE WIRE: HCPCS | Performed by: THORACIC SURGERY (CARDIOTHORACIC VASCULAR SURGERY)

## 2024-02-15 PROCEDURE — 85014 HEMATOCRIT: CPT

## 2024-02-15 PROCEDURE — 85730 THROMBOPLASTIN TIME PARTIAL: CPT | Performed by: PHYSICIAN ASSISTANT

## 2024-02-15 PROCEDURE — C1725 CATH, TRANSLUMIN NON-LASER: HCPCS | Performed by: THORACIC SURGERY (CARDIOTHORACIC VASCULAR SURGERY)

## 2024-02-15 PROCEDURE — 85347 COAGULATION TIME ACTIVATED: CPT

## 2024-02-15 PROCEDURE — 85014 HEMATOCRIT: CPT | Performed by: PHYSICIAN ASSISTANT

## 2024-02-15 PROCEDURE — NC001 PR NO CHARGE: Performed by: ANESTHESIOLOGY

## 2024-02-15 PROCEDURE — 06BQ4ZZ EXCISION OF LEFT SAPHENOUS VEIN, PERCUTANEOUS ENDOSCOPIC APPROACH: ICD-10-PCS | Performed by: THORACIC SURGERY (CARDIOTHORACIC VASCULAR SURGERY)

## 2024-02-15 PROCEDURE — 33533 CABG ARTERIAL SINGLE: CPT | Performed by: THORACIC SURGERY (CARDIOTHORACIC VASCULAR SURGERY)

## 2024-02-15 PROCEDURE — 82948 REAGENT STRIP/BLOOD GLUCOSE: CPT

## 2024-02-15 PROCEDURE — 82805 BLOOD GASES W/O2 SATURATION: CPT

## 2024-02-15 PROCEDURE — A7041 WATER SEAL DRAIN CONTAINER: HCPCS | Performed by: THORACIC SURGERY (CARDIOTHORACIC VASCULAR SURGERY)

## 2024-02-15 PROCEDURE — 93355 ECHO TRANSESOPHAGEAL (TEE): CPT

## 2024-02-15 PROCEDURE — 85049 AUTOMATED PLATELET COUNT: CPT | Performed by: PHYSICIAN ASSISTANT

## 2024-02-15 PROCEDURE — 33508 ENDOSCOPIC VEIN HARVEST: CPT | Performed by: PHYSICIAN ASSISTANT

## 2024-02-15 PROCEDURE — 84132 ASSAY OF SERUM POTASSIUM: CPT | Performed by: PHYSICIAN ASSISTANT

## 2024-02-15 PROCEDURE — 02100Z9 BYPASS CORONARY ARTERY, ONE ARTERY FROM LEFT INTERNAL MAMMARY, OPEN APPROACH: ICD-10-PCS | Performed by: THORACIC SURGERY (CARDIOTHORACIC VASCULAR SURGERY)

## 2024-02-15 PROCEDURE — 33533 CABG ARTERIAL SINGLE: CPT | Performed by: PHYSICIAN ASSISTANT

## 2024-02-15 PROCEDURE — 82330 ASSAY OF CALCIUM: CPT

## 2024-02-15 PROCEDURE — 82947 ASSAY GLUCOSE BLOOD QUANT: CPT

## 2024-02-15 DEVICE — IMPLANTABLE DEVICE
Type: IMPLANTABLE DEVICE | Site: HEART | Status: FUNCTIONAL
Brand: CARPENTIER-EDWARDS PHYSIO II ANNULOPLASTY RING

## 2024-02-15 DEVICE — MARKER CORONARY BYPASS VOSS GRAFT: Type: IMPLANTABLE DEVICE | Site: AORTA | Status: FUNCTIONAL

## 2024-02-15 RX ORDER — FENTANYL CITRATE 50 UG/ML
INJECTION, SOLUTION INTRAMUSCULAR; INTRAVENOUS AS NEEDED
Status: DISCONTINUED | OUTPATIENT
Start: 2024-02-15 | End: 2024-02-15

## 2024-02-15 RX ORDER — GLYCOPYRROLATE 0.2 MG/ML
INJECTION INTRAMUSCULAR; INTRAVENOUS AS NEEDED
Status: DISCONTINUED | OUTPATIENT
Start: 2024-02-15 | End: 2024-02-15

## 2024-02-15 RX ORDER — MIDAZOLAM HYDROCHLORIDE 2 MG/2ML
INJECTION, SOLUTION INTRAMUSCULAR; INTRAVENOUS AS NEEDED
Status: DISCONTINUED | OUTPATIENT
Start: 2024-02-15 | End: 2024-02-15

## 2024-02-15 RX ORDER — POTASSIUM CHLORIDE 14.9 MG/ML
20 INJECTION INTRAVENOUS
Status: DISCONTINUED | OUTPATIENT
Start: 2024-02-15 | End: 2024-02-16

## 2024-02-15 RX ORDER — BISACODYL 10 MG
10 SUPPOSITORY, RECTAL RECTAL DAILY PRN
Status: DISCONTINUED | OUTPATIENT
Start: 2024-02-15 | End: 2024-02-21 | Stop reason: HOSPADM

## 2024-02-15 RX ORDER — HEPARIN SODIUM 5000 [USP'U]/ML
5000 INJECTION, SOLUTION INTRAVENOUS; SUBCUTANEOUS EVERY 8 HOURS SCHEDULED
Status: DISCONTINUED | OUTPATIENT
Start: 2024-02-16 | End: 2024-02-21 | Stop reason: HOSPADM

## 2024-02-15 RX ORDER — POTASSIUM CHLORIDE 29.8 MG/ML
40 INJECTION INTRAVENOUS
Status: COMPLETED | OUTPATIENT
Start: 2024-02-15 | End: 2024-02-15

## 2024-02-15 RX ORDER — MAGNESIUM SULFATE HEPTAHYDRATE 40 MG/ML
2 INJECTION, SOLUTION INTRAVENOUS ONCE
Status: COMPLETED | OUTPATIENT
Start: 2024-02-15 | End: 2024-02-15

## 2024-02-15 RX ORDER — OXYCODONE HYDROCHLORIDE 5 MG/1
5 TABLET ORAL EVERY 4 HOURS PRN
Status: DISCONTINUED | OUTPATIENT
Start: 2024-02-15 | End: 2024-02-21 | Stop reason: HOSPADM

## 2024-02-15 RX ORDER — LIDOCAINE HCL/PF 100 MG/5ML
SYRINGE (ML) INJECTION AS NEEDED
Status: DISCONTINUED | OUTPATIENT
Start: 2024-02-15 | End: 2024-02-15

## 2024-02-15 RX ORDER — HYDROMORPHONE HCL/PF 1 MG/ML
0.5 SYRINGE (ML) INJECTION EVERY 2 HOUR PRN
Status: DISCONTINUED | OUTPATIENT
Start: 2024-02-15 | End: 2024-02-17

## 2024-02-15 RX ORDER — CALCIUM CHLORIDE 100 MG/ML
INJECTION INTRAVENOUS; INTRAVENTRICULAR AS NEEDED
Status: DISCONTINUED | OUTPATIENT
Start: 2024-02-15 | End: 2024-02-15

## 2024-02-15 RX ORDER — SODIUM CHLORIDE 9 MG/ML
INJECTION, SOLUTION INTRAVENOUS CONTINUOUS PRN
Status: DISCONTINUED | OUTPATIENT
Start: 2024-02-15 | End: 2024-02-15

## 2024-02-15 RX ORDER — HEPARIN SODIUM 1000 [USP'U]/ML
INJECTION, SOLUTION INTRAVENOUS; SUBCUTANEOUS AS NEEDED
Status: DISCONTINUED | OUTPATIENT
Start: 2024-02-15 | End: 2024-02-15

## 2024-02-15 RX ORDER — MAGNESIUM HYDROXIDE 1200 MG/15ML
LIQUID ORAL AS NEEDED
Status: DISCONTINUED | OUTPATIENT
Start: 2024-02-15 | End: 2024-02-15 | Stop reason: HOSPADM

## 2024-02-15 RX ORDER — MAGNESIUM SULFATE HEPTAHYDRATE 40 MG/ML
INJECTION, SOLUTION INTRAVENOUS AS NEEDED
Status: DISCONTINUED | OUTPATIENT
Start: 2024-02-15 | End: 2024-02-15

## 2024-02-15 RX ORDER — SODIUM CHLORIDE 450 MG/100ML
20 INJECTION, SOLUTION INTRAVENOUS CONTINUOUS
Status: DISCONTINUED | OUTPATIENT
Start: 2024-02-15 | End: 2024-02-17

## 2024-02-15 RX ORDER — AMIODARONE HYDROCHLORIDE 200 MG/1
200 TABLET ORAL EVERY 8 HOURS SCHEDULED
Status: DISCONTINUED | OUTPATIENT
Start: 2024-02-15 | End: 2024-02-21 | Stop reason: HOSPADM

## 2024-02-15 RX ORDER — NEOSTIGMINE METHYLSULFATE 1 MG/ML
INJECTION INTRAVENOUS AS NEEDED
Status: DISCONTINUED | OUTPATIENT
Start: 2024-02-15 | End: 2024-02-15

## 2024-02-15 RX ORDER — POLYETHYLENE GLYCOL 3350 17 G/17G
17 POWDER, FOR SOLUTION ORAL DAILY
Status: DISCONTINUED | OUTPATIENT
Start: 2024-02-15 | End: 2024-02-21 | Stop reason: HOSPADM

## 2024-02-15 RX ORDER — ASPIRIN 325 MG
325 TABLET ORAL DAILY
Status: DISCONTINUED | OUTPATIENT
Start: 2024-02-15 | End: 2024-02-21 | Stop reason: HOSPADM

## 2024-02-15 RX ORDER — LIDOCAINE HYDROCHLORIDE 10 MG/ML
INJECTION, SOLUTION EPIDURAL; INFILTRATION; INTRACAUDAL; PERINEURAL
Status: COMPLETED | OUTPATIENT
Start: 2024-02-15 | End: 2024-02-15

## 2024-02-15 RX ORDER — ALBUMIN, HUMAN INJ 5% 5 %
SOLUTION INTRAVENOUS CONTINUOUS PRN
Status: DISCONTINUED | OUTPATIENT
Start: 2024-02-15 | End: 2024-02-15

## 2024-02-15 RX ORDER — HEPARIN SODIUM 1000 [USP'U]/ML
10000 INJECTION, SOLUTION INTRAVENOUS; SUBCUTANEOUS ONCE
Status: COMPLETED | OUTPATIENT
Start: 2024-02-15 | End: 2024-02-15

## 2024-02-15 RX ORDER — MANNITOL 250 MG/ML
INJECTION, SOLUTION INTRAVENOUS AS NEEDED
Status: DISCONTINUED | OUTPATIENT
Start: 2024-02-15 | End: 2024-02-15

## 2024-02-15 RX ORDER — MILRINONE LACTATE 0.2 MG/ML
0.13 INJECTION, SOLUTION INTRAVENOUS CONTINUOUS
Status: DISCONTINUED | OUTPATIENT
Start: 2024-02-15 | End: 2024-02-18

## 2024-02-15 RX ORDER — CEFAZOLIN SODIUM 1 G/3ML
INJECTION, POWDER, FOR SOLUTION INTRAMUSCULAR; INTRAVENOUS AS NEEDED
Status: DISCONTINUED | OUTPATIENT
Start: 2024-02-15 | End: 2024-02-15

## 2024-02-15 RX ORDER — ONDANSETRON 2 MG/ML
4 INJECTION INTRAMUSCULAR; INTRAVENOUS EVERY 6 HOURS PRN
Status: DISCONTINUED | OUTPATIENT
Start: 2024-02-15 | End: 2024-02-21 | Stop reason: HOSPADM

## 2024-02-15 RX ORDER — ACETAMINOPHEN 650 MG/1
650 SUPPOSITORY RECTAL EVERY 4 HOURS PRN
Status: DISCONTINUED | OUTPATIENT
Start: 2024-02-15 | End: 2024-02-17

## 2024-02-15 RX ORDER — SODIUM CHLORIDE, SODIUM LACTATE, POTASSIUM CHLORIDE, CALCIUM CHLORIDE 600; 310; 30; 20 MG/100ML; MG/100ML; MG/100ML; MG/100ML
INJECTION, SOLUTION INTRAVENOUS CONTINUOUS PRN
Status: DISCONTINUED | OUTPATIENT
Start: 2024-02-15 | End: 2024-02-15

## 2024-02-15 RX ORDER — DEXTROSE MONOHYDRATE 25 G/50ML
25 INJECTION, SOLUTION INTRAVENOUS ONCE
Status: COMPLETED | OUTPATIENT
Start: 2024-02-15 | End: 2024-02-15

## 2024-02-15 RX ORDER — PANTOPRAZOLE SODIUM 40 MG/1
40 TABLET, DELAYED RELEASE ORAL DAILY
Status: DISCONTINUED | OUTPATIENT
Start: 2024-02-15 | End: 2024-02-21 | Stop reason: HOSPADM

## 2024-02-15 RX ORDER — ONDANSETRON 2 MG/ML
INJECTION INTRAMUSCULAR; INTRAVENOUS AS NEEDED
Status: DISCONTINUED | OUTPATIENT
Start: 2024-02-15 | End: 2024-02-15

## 2024-02-15 RX ORDER — FENTANYL CITRATE 50 UG/ML
50 INJECTION, SOLUTION INTRAMUSCULAR; INTRAVENOUS ONCE
Status: COMPLETED | OUTPATIENT
Start: 2024-02-15 | End: 2024-02-15

## 2024-02-15 RX ORDER — CEFAZOLIN SODIUM 2 G/50ML
2000 SOLUTION INTRAVENOUS EVERY 8 HOURS
Qty: 150 ML | Refills: 0 | Status: COMPLETED | OUTPATIENT
Start: 2024-02-15 | End: 2024-02-17

## 2024-02-15 RX ORDER — SODIUM CHLORIDE, SODIUM GLUCONATE, SODIUM ACETATE, POTASSIUM CHLORIDE, MAGNESIUM CHLORIDE, SODIUM PHOSPHATE, DIBASIC, AND POTASSIUM PHOSPHATE .53; .5; .37; .037; .03; .012; .00082 G/100ML; G/100ML; G/100ML; G/100ML; G/100ML; G/100ML; G/100ML
INJECTION, SOLUTION INTRAVENOUS AS NEEDED
Status: DISCONTINUED | OUTPATIENT
Start: 2024-02-15 | End: 2024-02-15

## 2024-02-15 RX ORDER — VASOPRESSIN 20 U/ML
INJECTION PARENTERAL AS NEEDED
Status: DISCONTINUED | OUTPATIENT
Start: 2024-02-15 | End: 2024-02-15

## 2024-02-15 RX ORDER — VANCOMYCIN HYDROCHLORIDE 1 G/20ML
INJECTION, POWDER, LYOPHILIZED, FOR SOLUTION INTRAVENOUS AS NEEDED
Status: DISCONTINUED | OUTPATIENT
Start: 2024-02-15 | End: 2024-02-15 | Stop reason: HOSPADM

## 2024-02-15 RX ORDER — CEFAZOLIN SODIUM 2 G/50ML
2000 SOLUTION INTRAVENOUS ONCE
Status: DISCONTINUED | OUTPATIENT
Start: 2024-02-15 | End: 2024-02-15 | Stop reason: HOSPADM

## 2024-02-15 RX ORDER — CHLORHEXIDINE GLUCONATE ORAL RINSE 1.2 MG/ML
15 SOLUTION DENTAL 2 TIMES DAILY
Status: DISCONTINUED | OUTPATIENT
Start: 2024-02-15 | End: 2024-02-19

## 2024-02-15 RX ORDER — PROTAMINE SULFATE 10 MG/ML
INJECTION, SOLUTION INTRAVENOUS AS NEEDED
Status: DISCONTINUED | OUTPATIENT
Start: 2024-02-15 | End: 2024-02-15

## 2024-02-15 RX ORDER — CALCIUM CHLORIDE 100 MG/ML
1 INJECTION INTRAVENOUS; INTRAVENTRICULAR ONCE AS NEEDED
Status: DISCONTINUED | OUTPATIENT
Start: 2024-02-15 | End: 2024-02-16

## 2024-02-15 RX ORDER — MILRINONE LACTATE 0.2 MG/ML
INJECTION, SOLUTION INTRAVENOUS AS NEEDED
Status: DISCONTINUED | OUTPATIENT
Start: 2024-02-15 | End: 2024-02-15

## 2024-02-15 RX ORDER — ACETAMINOPHEN 325 MG/1
650 TABLET ORAL
Status: DISCONTINUED | OUTPATIENT
Start: 2024-02-15 | End: 2024-02-21 | Stop reason: HOSPADM

## 2024-02-15 RX ORDER — ROCURONIUM BROMIDE 10 MG/ML
INJECTION, SOLUTION INTRAVENOUS AS NEEDED
Status: DISCONTINUED | OUTPATIENT
Start: 2024-02-15 | End: 2024-02-15

## 2024-02-15 RX ORDER — FENTANYL CITRATE 50 UG/ML
50 INJECTION, SOLUTION INTRAMUSCULAR; INTRAVENOUS
Status: DISCONTINUED | OUTPATIENT
Start: 2024-02-15 | End: 2024-02-16

## 2024-02-15 RX ORDER — ESMOLOL HYDROCHLORIDE 10 MG/ML
INJECTION INTRAVENOUS AS NEEDED
Status: DISCONTINUED | OUTPATIENT
Start: 2024-02-15 | End: 2024-02-15

## 2024-02-15 RX ORDER — POTASSIUM CHLORIDE 14.9 MG/ML
20 INJECTION INTRAVENOUS ONCE AS NEEDED
Status: DISCONTINUED | OUTPATIENT
Start: 2024-02-15 | End: 2024-02-16

## 2024-02-15 RX ORDER — PROPOFOL 10 MG/ML
INJECTION, EMULSION INTRAVENOUS CONTINUOUS PRN
Status: DISCONTINUED | OUTPATIENT
Start: 2024-02-15 | End: 2024-02-15

## 2024-02-15 RX ORDER — DEXMEDETOMIDINE HYDROCHLORIDE 4 UG/ML
.1-.7 INJECTION, SOLUTION INTRAVENOUS
Status: DISCONTINUED | OUTPATIENT
Start: 2024-02-15 | End: 2024-02-16

## 2024-02-15 RX ORDER — HEPARIN SODIUM 1000 [USP'U]/ML
400 INJECTION, SOLUTION INTRAVENOUS; SUBCUTANEOUS ONCE
Status: DISCONTINUED | OUTPATIENT
Start: 2024-02-15 | End: 2024-02-15 | Stop reason: HOSPADM

## 2024-02-15 RX ORDER — AMINOCAPROIC ACID 250 MG/ML
INJECTION, SOLUTION INTRAVENOUS AS NEEDED
Status: DISCONTINUED | OUTPATIENT
Start: 2024-02-15 | End: 2024-02-15

## 2024-02-15 RX ORDER — EPHEDRINE SULFATE 50 MG/ML
INJECTION INTRAVENOUS AS NEEDED
Status: DISCONTINUED | OUTPATIENT
Start: 2024-02-15 | End: 2024-02-15

## 2024-02-15 RX ORDER — ATORVASTATIN CALCIUM 80 MG/1
80 TABLET, FILM COATED ORAL
Status: DISCONTINUED | OUTPATIENT
Start: 2024-02-15 | End: 2024-02-21 | Stop reason: HOSPADM

## 2024-02-15 RX ADMIN — FENTANYL CITRATE 50 MCG: 50 INJECTION INTRAMUSCULAR; INTRAVENOUS at 17:00

## 2024-02-15 RX ADMIN — EPINEPHRINE 6 MCG: 1 INJECTION, SOLUTION, CONCENTRATE INTRAVENOUS at 10:17

## 2024-02-15 RX ADMIN — EPHEDRINE SULFATE 5 MG: 50 INJECTION, SOLUTION INTRAVENOUS at 09:25

## 2024-02-15 RX ADMIN — ONDANSETRON 4 MG: 2 INJECTION INTRAMUSCULAR; INTRAVENOUS at 12:23

## 2024-02-15 RX ADMIN — FENTANYL CITRATE 50 MCG: 50 INJECTION INTRAMUSCULAR; INTRAVENOUS at 15:00

## 2024-02-15 RX ADMIN — PHENYLEPHRINE HYDROCHLORIDE 500 MCG: 10 INJECTION INTRAVENOUS at 11:18

## 2024-02-15 RX ADMIN — FENTANYL CITRATE 500 MCG: 0.05 INJECTION, SOLUTION INTRAMUSCULAR; INTRAVENOUS at 08:01

## 2024-02-15 RX ADMIN — Medication 750 ML: at 10:46

## 2024-02-15 RX ADMIN — AMIODARONE HYDROCHLORIDE 75 MG: 50 INJECTION, SOLUTION INTRAVENOUS at 11:26

## 2024-02-15 RX ADMIN — CHLORHEXIDINE GLUCONATE 15 ML: 1.2 SOLUTION ORAL at 18:01

## 2024-02-15 RX ADMIN — FENTANYL CITRATE 50 MCG: 50 INJECTION INTRAMUSCULAR; INTRAVENOUS at 23:55

## 2024-02-15 RX ADMIN — VASOPRESSIN 2 UNITS: 20 INJECTION INTRAVENOUS at 10:23

## 2024-02-15 RX ADMIN — SODIUM CHLORIDE 20 ML/HR: 0.45 INJECTION, SOLUTION INTRAVENOUS at 13:57

## 2024-02-15 RX ADMIN — HEPARIN SODIUM 10000 UNITS: 1000 INJECTION INTRAVENOUS; SUBCUTANEOUS at 10:26

## 2024-02-15 RX ADMIN — PHENYLEPHRINE HYDROCHLORIDE 500 MCG: 10 INJECTION INTRAVENOUS at 11:43

## 2024-02-15 RX ADMIN — MIDAZOLAM 1 MG: 1 INJECTION INTRAMUSCULAR; INTRAVENOUS at 07:51

## 2024-02-15 RX ADMIN — PHENYLEPHRINE HYDROCHLORIDE 500 MCG: 10 INJECTION INTRAVENOUS at 11:51

## 2024-02-15 RX ADMIN — ROCURONIUM BROMIDE 50 MG: 10 INJECTION, SOLUTION INTRAVENOUS at 10:23

## 2024-02-15 RX ADMIN — SODIUM CHLORIDE: 9 INJECTION, SOLUTION INTRAVENOUS at 08:45

## 2024-02-15 RX ADMIN — PROTAMINE SULFATE 250 MG: 10 INJECTION, SOLUTION INTRAVENOUS at 12:23

## 2024-02-15 RX ADMIN — DEXMEDETOMIDINE HYDROCHLORIDE 0.3 MCG/KG/HR: 4 INJECTION, SOLUTION INTRAVENOUS at 16:19

## 2024-02-15 RX ADMIN — PHENYLEPHRINE HYDROCHLORIDE 500 MCG: 10 INJECTION INTRAVENOUS at 11:53

## 2024-02-15 RX ADMIN — PHENYLEPHRINE HYDROCHLORIDE 500 MCG: 10 INJECTION INTRAVENOUS at 10:56

## 2024-02-15 RX ADMIN — MIDAZOLAM 4 MG: 1 INJECTION INTRAMUSCULAR; INTRAVENOUS at 08:01

## 2024-02-15 RX ADMIN — EPINEPHRINE 6 MCG: 1 INJECTION, SOLUTION, CONCENTRATE INTRAVENOUS at 09:37

## 2024-02-15 RX ADMIN — PHENYLEPHRINE HYDROCHLORIDE 500 MCG: 10 INJECTION INTRAVENOUS at 10:53

## 2024-02-15 RX ADMIN — MIDAZOLAM 4 MG: 1 INJECTION INTRAMUSCULAR; INTRAVENOUS at 10:23

## 2024-02-15 RX ADMIN — VASOPRESSIN 2 UNITS: 20 INJECTION INTRAVENOUS at 12:48

## 2024-02-15 RX ADMIN — VASOPRESSIN 2 UNITS: 20 INJECTION INTRAVENOUS at 08:40

## 2024-02-15 RX ADMIN — DEXTROSE MONOHYDRATE 25 ML: 25 INJECTION, SOLUTION INTRAVENOUS at 19:50

## 2024-02-15 RX ADMIN — VASOPRESSIN 2 UNITS: 20 INJECTION INTRAVENOUS at 09:25

## 2024-02-15 RX ADMIN — FENTANYL CITRATE 100 MCG: 0.05 INJECTION, SOLUTION INTRAMUSCULAR; INTRAVENOUS at 13:43

## 2024-02-15 RX ADMIN — CALCIUM CHLORIDE 0.5 G: 100 INJECTION INTRAVENOUS; INTRAVENTRICULAR at 12:33

## 2024-02-15 RX ADMIN — SODIUM CHLORIDE, SODIUM LACTATE, POTASSIUM CHLORIDE, AND CALCIUM CHLORIDE: .6; .31; .03; .02 INJECTION, SOLUTION INTRAVENOUS at 07:50

## 2024-02-15 RX ADMIN — MILRINONE LACTATE IN DEXTROSE 0.5 MCG/KG/MIN: 200 INJECTION, SOLUTION INTRAVENOUS at 08:00

## 2024-02-15 RX ADMIN — FENTANYL CITRATE 100 MCG: 0.05 INJECTION, SOLUTION INTRAMUSCULAR; INTRAVENOUS at 12:23

## 2024-02-15 RX ADMIN — AMINOCAPROIC ACID 5 G: 250 INJECTION, SOLUTION INTRAVENOUS at 08:45

## 2024-02-15 RX ADMIN — SODIUM CHLORIDE, SODIUM GLUCONATE, SODIUM ACETATE, POTASSIUM CHLORIDE, MAGNESIUM CHLORIDE, SODIUM PHOSPHATE, DIBASIC, AND POTASSIUM PHOSPHATE 200 ML: .53; .5; .37; .037; .03; .012; .00082 INJECTION, SOLUTION INTRAVENOUS at 10:27

## 2024-02-15 RX ADMIN — MAGNESIUM SULFATE HEPTAHYDRATE 2 G: 40 INJECTION, SOLUTION INTRAVENOUS at 12:34

## 2024-02-15 RX ADMIN — NEOSTIGMINE METHYLSULFATE 2 MG: 1 INJECTION INTRAVENOUS at 12:26

## 2024-02-15 RX ADMIN — FENTANYL CITRATE 50 MCG: 0.05 INJECTION, SOLUTION INTRAMUSCULAR; INTRAVENOUS at 13:09

## 2024-02-15 RX ADMIN — MAGNESIUM SULFATE HEPTAHYDRATE 2 G: 40 INJECTION, SOLUTION INTRAVENOUS at 13:57

## 2024-02-15 RX ADMIN — INSULIN HUMAN 10 UNITS: 100 INJECTION, SOLUTION PARENTERAL at 19:50

## 2024-02-15 RX ADMIN — HEPARIN SODIUM 22400 UNITS: 1000 INJECTION INTRAVENOUS; SUBCUTANEOUS at 09:55

## 2024-02-15 RX ADMIN — SODIUM CHLORIDE, SODIUM LACTATE, POTASSIUM CHLORIDE, CALCIUM CHLORIDE: 600; 310; 30; 20 INJECTION, SOLUTION INTRAVENOUS at 08:45

## 2024-02-15 RX ADMIN — FENTANYL CITRATE 50 MCG: 50 INJECTION INTRAMUSCULAR; INTRAVENOUS at 16:41

## 2024-02-15 RX ADMIN — MANNITOL 25 G: 12.5 INJECTION, SOLUTION INTRAVENOUS at 08:01

## 2024-02-15 RX ADMIN — MIDAZOLAM 1 MG: 1 INJECTION INTRAMUSCULAR; INTRAVENOUS at 07:47

## 2024-02-15 RX ADMIN — AMINOCAPROIC ACID 2 G/HR: 250 INJECTION, SOLUTION INTRAVENOUS at 08:45

## 2024-02-15 RX ADMIN — LIDOCAINE HYDROCHLORIDE 100 MG: 20 INJECTION INTRAVENOUS at 12:02

## 2024-02-15 RX ADMIN — NOREPINEPHRINE BITARTRATE 2 MCG/MIN: 1 SOLUTION INTRAVENOUS at 09:32

## 2024-02-15 RX ADMIN — PHENYLEPHRINE HYDROCHLORIDE 1000 MCG: 10 INJECTION INTRAVENOUS at 12:00

## 2024-02-15 RX ADMIN — VASOPRESSIN 2 UNITS: 20 INJECTION INTRAVENOUS at 10:17

## 2024-02-15 RX ADMIN — PHENYLEPHRINE HYDROCHLORIDE 500 MCG: 10 INJECTION INTRAVENOUS at 11:15

## 2024-02-15 RX ADMIN — PHENYLEPHRINE HYDROCHLORIDE 500 MCG: 10 INJECTION INTRAVENOUS at 10:37

## 2024-02-15 RX ADMIN — PHENYLEPHRINE HYDROCHLORIDE 500 MCG: 10 INJECTION INTRAVENOUS at 11:10

## 2024-02-15 RX ADMIN — Medication 12.5 MG: at 05:20

## 2024-02-15 RX ADMIN — MUPIROCIN 1 APPLICATION: 20 OINTMENT TOPICAL at 20:14

## 2024-02-15 RX ADMIN — PHENYLEPHRINE HYDROCHLORIDE 500 MCG: 10 INJECTION INTRAVENOUS at 11:49

## 2024-02-15 RX ADMIN — FENTANYL CITRATE 50 MCG: 50 INJECTION INTRAMUSCULAR; INTRAVENOUS at 20:11

## 2024-02-15 RX ADMIN — FENTANYL CITRATE 50 MCG: 50 INJECTION INTRAMUSCULAR; INTRAVENOUS at 18:00

## 2024-02-15 RX ADMIN — HEPARIN SODIUM 10000 UNITS: 1000 INJECTION INTRAVENOUS; SUBCUTANEOUS at 10:40

## 2024-02-15 RX ADMIN — SODIUM BICARBONATE 100 MEQ: 84 INJECTION, SOLUTION INTRAVENOUS at 10:48

## 2024-02-15 RX ADMIN — SODIUM CHLORIDE, SODIUM GLUCONATE, SODIUM ACETATE, POTASSIUM CHLORIDE, MAGNESIUM CHLORIDE, SODIUM PHOSPHATE, DIBASIC, AND POTASSIUM PHOSPHATE 500 ML: .53; .5; .37; .037; .03; .012; .00082 INJECTION, SOLUTION INTRAVENOUS at 12:14

## 2024-02-15 RX ADMIN — VASOPRESSIN 2 UNITS: 20 INJECTION INTRAVENOUS at 12:54

## 2024-02-15 RX ADMIN — VASOPRESSIN 0.08 UNITS/MIN: 20 INJECTION INTRAVENOUS at 14:43

## 2024-02-15 RX ADMIN — CALCIUM CHLORIDE 0.5 G: 100 INJECTION INTRAVENOUS; INTRAVENTRICULAR at 08:00

## 2024-02-15 RX ADMIN — NEOSTIGMINE METHYLSULFATE 2 MG: 1 INJECTION INTRAVENOUS at 12:56

## 2024-02-15 RX ADMIN — PHENYLEPHRINE HYDROCHLORIDE 500 MCG: 10 INJECTION INTRAVENOUS at 11:06

## 2024-02-15 RX ADMIN — CHLORHEXIDINE GLUCONATE 15 ML: 1.2 SOLUTION ORAL at 05:20

## 2024-02-15 RX ADMIN — HYDROMORPHONE HYDROCHLORIDE 0.5 MG: 1 INJECTION, SOLUTION INTRAMUSCULAR; INTRAVENOUS; SUBCUTANEOUS at 22:42

## 2024-02-15 RX ADMIN — CEFAZOLIN SODIUM 2000 MG: 2 SOLUTION INTRAVENOUS at 19:40

## 2024-02-15 RX ADMIN — SODIUM CHLORIDE, SODIUM GLUCONATE, SODIUM ACETATE, POTASSIUM CHLORIDE, MAGNESIUM CHLORIDE, SODIUM PHOSPHATE, DIBASIC, AND POTASSIUM PHOSPHATE 500 ML: .53; .5; .37; .037; .03; .012; .00082 INJECTION, SOLUTION INTRAVENOUS at 08:01

## 2024-02-15 RX ADMIN — CEFAZOLIN 2000 MG: 1 INJECTION, POWDER, FOR SOLUTION INTRAMUSCULAR; INTRAVENOUS at 12:23

## 2024-02-15 RX ADMIN — ESMOLOL HYDROCHLORIDE 40 MG: 100 INJECTION, SOLUTION INTRAVENOUS at 12:30

## 2024-02-15 RX ADMIN — PHENYLEPHRINE HYDROCHLORIDE 500 MCG: 10 INJECTION INTRAVENOUS at 11:36

## 2024-02-15 RX ADMIN — SODIUM CHLORIDE 4 UNITS/HR: 9 INJECTION, SOLUTION INTRAVENOUS at 10:04

## 2024-02-15 RX ADMIN — SODIUM CHLORIDE, SODIUM LACTATE, POTASSIUM CHLORIDE, AND CALCIUM CHLORIDE 250 ML: .6; .31; .03; .02 INJECTION, SOLUTION INTRAVENOUS at 13:58

## 2024-02-15 RX ADMIN — MUPIROCIN 1 APPLICATION: 20 OINTMENT TOPICAL at 05:22

## 2024-02-15 RX ADMIN — PROPOFOL 30 MCG/KG/MIN: 10 INJECTION, EMULSION INTRAVENOUS at 11:19

## 2024-02-15 RX ADMIN — EPINEPHRINE 2 MCG/MIN: 1 INJECTION, SOLUTION, CONCENTRATE INTRAVENOUS at 08:00

## 2024-02-15 RX ADMIN — VASOPRESSIN 2 UNITS: 20 INJECTION INTRAVENOUS at 08:42

## 2024-02-15 RX ADMIN — HYDROMORPHONE HYDROCHLORIDE 0.5 MG: 1 INJECTION, SOLUTION INTRAMUSCULAR; INTRAVENOUS; SUBCUTANEOUS at 17:20

## 2024-02-15 RX ADMIN — ALBUMIN (HUMAN): 12.5 INJECTION, SOLUTION INTRAVENOUS at 12:50

## 2024-02-15 RX ADMIN — FENTANYL CITRATE 500 MCG: 0.05 INJECTION, SOLUTION INTRAMUSCULAR; INTRAVENOUS at 09:12

## 2024-02-15 RX ADMIN — SODIUM BICARBONATE 50 MEQ: 84 INJECTION, SOLUTION INTRAVENOUS at 08:01

## 2024-02-15 RX ADMIN — PHENYLEPHRINE HYDROCHLORIDE 500 MCG: 10 INJECTION INTRAVENOUS at 11:01

## 2024-02-15 RX ADMIN — MILRINONE LACTATE IN DEXTROSE 1 MG: 200 INJECTION, SOLUTION INTRAVENOUS at 09:19

## 2024-02-15 RX ADMIN — PHENYLEPHRINE HYDROCHLORIDE 500 MCG: 10 INJECTION INTRAVENOUS at 11:21

## 2024-02-15 RX ADMIN — PHENYLEPHRINE HYDROCHLORIDE 500 MCG: 10 INJECTION INTRAVENOUS at 11:26

## 2024-02-15 RX ADMIN — NOREPINEPHRINE BITARTRATE 2 MCG/MIN: 1 SOLUTION INTRAVENOUS at 08:00

## 2024-02-15 RX ADMIN — PHENYLEPHRINE HYDROCHLORIDE 200 MCG: 10 INJECTION INTRAVENOUS at 12:31

## 2024-02-15 RX ADMIN — MAGNESIUM SULFATE HEPTAHYDRATE 2 G: 500 INJECTION, SOLUTION INTRAMUSCULAR; INTRAVENOUS at 11:43

## 2024-02-15 RX ADMIN — HYDROMORPHONE HYDROCHLORIDE 0.5 MG: 1 INJECTION, SOLUTION INTRAMUSCULAR; INTRAVENOUS; SUBCUTANEOUS at 19:39

## 2024-02-15 RX ADMIN — PHENYLEPHRINE HYDROCHLORIDE 500 MCG: 10 INJECTION INTRAVENOUS at 11:33

## 2024-02-15 RX ADMIN — AMIODARONE HYDROCHLORIDE 75 MG: 50 INJECTION, SOLUTION INTRAVENOUS at 11:11

## 2024-02-15 RX ADMIN — VASOPRESSIN 0.04 UNITS/MIN: 20 INJECTION INTRAVENOUS at 09:22

## 2024-02-15 RX ADMIN — ROCURONIUM BROMIDE 100 MG: 10 INJECTION, SOLUTION INTRAVENOUS at 08:01

## 2024-02-15 RX ADMIN — PHENYLEPHRINE HYDROCHLORIDE 500 MCG: 10 INJECTION INTRAVENOUS at 11:38

## 2024-02-15 RX ADMIN — POTASSIUM CHLORIDE 40 MEQ: 29.8 INJECTION, SOLUTION INTRAVENOUS at 14:49

## 2024-02-15 RX ADMIN — HEPARIN SODIUM 1 EACH: 5000 INJECTION INTRAVENOUS; SUBCUTANEOUS at 08:01

## 2024-02-15 RX ADMIN — CEFAZOLIN 2000 MG: 1 INJECTION, POWDER, FOR SOLUTION INTRAMUSCULAR; INTRAVENOUS at 08:35

## 2024-02-15 RX ADMIN — EPHEDRINE SULFATE 5 MG: 50 INJECTION, SOLUTION INTRAVENOUS at 10:23

## 2024-02-15 RX ADMIN — GLYCOPYRROLATE 0.4 MCG: 0.2 INJECTION, SOLUTION INTRAMUSCULAR; INTRAVENOUS at 12:56

## 2024-02-15 RX ADMIN — ALBUMIN (HUMAN): 12.5 INJECTION, SOLUTION INTRAVENOUS at 12:58

## 2024-02-15 RX ADMIN — CALCIUM CHLORIDE 1 G: 100 INJECTION INTRAVENOUS; INTRAVENTRICULAR at 12:08

## 2024-02-15 RX ADMIN — SODIUM CHLORIDE: 0.9 INJECTION, SOLUTION INTRAVENOUS at 07:45

## 2024-02-15 RX ADMIN — FENTANYL CITRATE 50 MCG: 50 INJECTION INTRAMUSCULAR; INTRAVENOUS at 14:00

## 2024-02-15 RX ADMIN — PHENYLEPHRINE HYDROCHLORIDE 500 MCG: 10 INJECTION INTRAVENOUS at 10:46

## 2024-02-15 RX ADMIN — INSULIN HUMAN 5 UNITS: 100 INJECTION, SOLUTION PARENTERAL at 11:39

## 2024-02-15 RX ADMIN — HEPARIN SODIUM 5000 UNITS: 5000 INJECTION INTRAVENOUS; SUBCUTANEOUS at 05:22

## 2024-02-15 RX ADMIN — LIDOCAINE HYDROCHLORIDE 1 ML: 10 INJECTION, SOLUTION EPIDURAL; INFILTRATION; INTRACAUDAL; PERINEURAL at 07:55

## 2024-02-15 RX ADMIN — MILRINONE LACTATE IN DEXTROSE 0.5 MCG/KG/MIN: 200 INJECTION, SOLUTION INTRAVENOUS at 14:44

## 2024-02-15 RX ADMIN — PHENYLEPHRINE HYDROCHLORIDE 500 MCG: 10 INJECTION INTRAVENOUS at 11:25

## 2024-02-15 RX ADMIN — SODIUM CHLORIDE, SODIUM LACTATE, POTASSIUM CHLORIDE, CALCIUM CHLORIDE: 600; 310; 30; 20 INJECTION, SOLUTION INTRAVENOUS at 13:11

## 2024-02-15 RX ADMIN — VASOPRESSIN 0.04 UNITS/MIN: 20 INJECTION INTRAVENOUS at 20:18

## 2024-02-15 RX ADMIN — PHENYLEPHRINE HYDROCHLORIDE 500 MCG: 10 INJECTION INTRAVENOUS at 11:48

## 2024-02-15 RX ADMIN — MILRINONE LACTATE IN DEXTROSE 1 MG: 200 INJECTION, SOLUTION INTRAVENOUS at 09:12

## 2024-02-15 NOTE — OP NOTE
OPERATIVE REPORT  PATIENT NAME: Rohan Don    :  1960  MRN: [id number]  Pt Location: [facility] OR ROOM 10    SURGERY DATE: 2/15/2024    SURGEON: AGNES Shaw MD     ASSISTANT: Judi Espinosa PA-C    ADDITIONAL ASSISTANT: None     PREOPERATIVE DIAGNOSIS: Coronary artery disease, ischemic and non-ischemic cardiomyopathy, Functional mitral regurgitation    POSTOPERATIVE DIAGNOSES: Coronary artery disease, ischemic and non-ischemic cardiomyopathy, Functional mitral regurgitation    NYHA CLASS: 4    CCS CLASS: 4    PROCEDURES:1.  Intra-aortic balloon pump placement; 2. Coronary artery bypass grafting x2 with LIMA to LAD and SVG to RCA; 3. Mitral valve repair with 32 mm Herman Physio II ring annuloplasty.     ANESTHESIA General endotracheal anesthesia with transesophageal echocardiogram guidance, Dr. Raul Huitron     CARDIOPULMONARY BYPASS TIME: 112 minutes.     CROSSCLAMP TIME: 76 minutes.     Chest tubes: x 3     Pacing wires: A x1 and V x1.     TRANSFUSIONS: None     SPECIMENS: None     ESTIMATED BLOOD LOSS: 200 mL    OPERATIVE TECHNIQUE: The patient was taken to the operating room and placed supine on the operating table. Following the satisfactory induction of general anesthesia and the placement of monitoring lines the patient was prepped and draped in the usual sterile fashion. A time-out procedure was performed.    The JUAN was reviewed.  The mitral regurgitation was severe at normal blood pressure due to posterior leaflet restriction and anterior leaflet pseudo-prolapse. The LVEF was 15-20%.  The decision was made to proceed with CABG x2 and mitral valve repair with IABP support.    The right common femoral artery was punctured with a needle and Seldinger technique was used to secure access to the vessel. The wire was passed into the aorta and confirmed by JUAN guidance.  The tract was dilated and an intra-aortic balloon pump was placed.  The IABp was positioned just below the left subclavian artery with JUAN  guidance.  The IABP was secured and IABP support was initiated.    The patient underwent median sternotomy, LIMA harvest, endoscopic left greater saphenous vein harvest, systemic heparinization and conduit preparation. The patient underwent pericardiotomy and epiaortic ultrasound was used to evaluate the ascending aorta, which was found to be free of significant atheromatous disease.  The patient underwent aortic and bicaval cannulation and an antegrade was placed. The patient was initiated on bypass and an LV vent was placed through the right superior pulmonary vein. The IABP was paused at this point. The ascending aorta was crossclamped. Antegrade del Nido cardioplegia was delivered with an excellent arrest.     The saphenous vein was anastomosed to the  right coronary artery in end-to-side fashion using running 7-0 Prolene suture. The left internal mammary artery was anastomosed to the left anterior descending in end-to-side fashion using running 7-0 Prolene suture.  The quality of both grafts was excellent.    Left atriotomy was performed and the mitral valve was exposed with a self-retaining retractor. Thorough mitral valve analysis was performed. There was functional mitral valve disease with posterior leaflet restriction and dilated annulus and dilated LV. I felt that the valve was therefore repairable. Annuloplasty sutures were placed. A 32 mm Physio II ring was selected and brought to the field. The sutures were passed through the ring. The ring was seated and the sutures were tied down. The valve was tested and found to be competent. While de-airing the heart the left atriotomy was closed with running 3-0 Prolene suture.     A total of 1 proximal anastomosis was completed on the ascending aorta in end-to-side fashion using running 5-0 Prolene suture.    The heart was extensively de-aired and the crossclamp was removed. Atrial and ventricular pacing wires were placed.  IABP support was resumed to assist  with reperfusion. Following a period of reperfusion the patient was weaned from cardiopulmonary bypass and decannulated. Protamine was administered with normalization of the ACT. Hemostasis was confirmed in all fields. Thoracostomy tubes were placed. The sternum was closed with stainless steel wires. The fascia, subdermis and skin were closed with multiple layers of running absorbable suture.    As the attending surgeon, I was present and scrubbed for all critical portions of this procedure. Sponge, needle and instrument counts were reported as correct by the nursing staff. There is no cardiac residency program at this facility and for complex procedures such as this, it is vital to have a physician assistant experienced in cardiac surgery.  The assistance of Judi Espinosa PA-C was necessary for endoscopic saphenous vein harvesting, retraction of the heart and coronary conduit with proper tissue handling techniques, and following of the suture with correct tension during construction of the proximal and distal coronary anastomoses. The assistance of Judi Espinosa PA-C was necessary during mitral valve repair for retraction of the heart with correct tissue handling techniques during exposure of the mitral valve, annular suture placement, and left atriotomy closure.    Final JUAN demonstrated improved LVEF at 30%, moderately dilated RV with moderately decreased function, no mitral regurgitation.       SIGNATURE: AGNES Shaw MD  DATE: February 15, 2024  TIME: 1:12 PM

## 2024-02-15 NOTE — INTERVAL H&P NOTE
Vitals:    02/15/24 0255   BP: 104/73   Pulse: 89   Resp: 16   Temp: 97.5 °F (36.4 °C)   SpO2: 95%         H&P reviewed. After examining the patient I find no changes in the patients condition since the H&P was completed.    Plan for CABG, Possible MV repair/replace, with IABP support.    Preoperative Beta Blocker: indicated.    Anticipated Length of Stay: Patient will be admitted on an inpatient basis with an anticipated length of stay of grater than 2 midnights.  Justification for Hospital StaY: Post surgical recovery following open heart surgery.      AGNES Shaw MD  02/15/24  7:29 AM

## 2024-02-15 NOTE — ANESTHESIA PROCEDURE NOTES
Central Line Insertion    Performed by: Raul Huitron MD  Authorized by: Raul Huitron MD    Date/Time: 2/15/2024 8:16 AM  Catheter Type:  triple lumen  Consent: Verbal consent obtained. Written consent obtained.  Risks and benefits: risks, benefits and alternatives were discussed  Consent given by: patient  Patient understanding: patient states understanding of the procedure being performed  Patient consent: the patient's understanding of the procedure matches consent given  Procedure consent: procedure consent matches procedure scheduled  Relevant documents: relevant documents present and verified  Required items: required blood products, implants, devices, and special equipment available  Patient identity confirmed: arm band  Indications: vascular access  Catheter size: 7 Fr  Patient position: Trendelenburg  Anesthesia method: ga.  Assessment: blood return through all ports and free fluid flow  Preparation: skin prepped with ChloraPrep  Skin prep agent dried: skin prep agent completely dried prior to procedure  Sterile barriers: all five maximum sterile barriers used - cap, mask, sterile gown, sterile gloves, and large sterile sheet  Hand hygiene: hand hygiene performed prior to central venous catheter insertion  sterile gel and probe cover used in ultrasound-guided central venous catheter insertionultrasound permanent image saved  Pre-procedure: landmarks identified  Vessel of Catheter Tip End: centra lvenous  Number of attempts: 1  Successful placement: yes  Post-procedure: chlorhexidine patch applied, dressing applied and line sutured  Patient tolerance: patient tolerated the procedure well with no immediate complications

## 2024-02-15 NOTE — ANESTHESIA PROCEDURE NOTES
Arterial Line Insertion    Performed by: Raul Huitron MD  Authorized by: Raul Huitron MD  Consent: Verbal consent obtained. Written consent obtained.  Risks and benefits: risks, benefits and alternatives were discussed  Consent given by: patient  Patient understanding: patient states understanding of the procedure being performed  Patient consent: the patient's understanding of the procedure matches consent given  Procedure consent: procedure consent matches procedure scheduled  Relevant documents: relevant documents present and verified  Required items: required blood products, implants, devices, and special equipment available  Patient identity confirmed: arm band  Preparation: Patient was prepped and draped in the usual sterile fashion.  Indications: multiple ABGs and hemodynamic monitoring  Orientation:  Right  Location: brachial arterylidocaine (PF) (XYLOCAINE-MPF) 1 % - Infiltration   1 mL - 2/15/2024 7:55:00 AM  Sedation:  Patient sedated: yes  Sedatives: see MAR for details  Analgesia: see MAR for details  Vitals: Vital signs were monitored during sedation.    Procedure Details:  Needle gauge: 4 Fr.  Seldinger technique: Seldinger technique used  Number of attempts: 1    Post-procedure:  Post-procedure: chlorhexidine patch applied and line sutured  Waveform: good waveform and waveform confirmed  Post-procedure CNS: normal and unchanged  Patient tolerance: patient tolerated the procedure well with no immediate complications  Comments: Pre-induction, sterile technique

## 2024-02-15 NOTE — ANESTHESIA POSTPROCEDURE EVALUATION
Post-Op Assessment Note    CV Status:  Stable        Airway: intubated     Post Op Vitals Reviewed: Yes    No anethesia notable event occurred.    Staff: Anesthesiologist   Comments: see intra-op quick note for details of ICU hand off    Reason for prolonged intubation > 24 hours:  ExtubatedReason for prolonged intubation > 48 hours:  Extubated          BP      Temp      Pulse     Resp      SpO2

## 2024-02-15 NOTE — RESPIRATORY THERAPY NOTE
Resp Therapy    02/15/24 1408   Respiratory Assessment   Resp Comments Pt brought up from OR following CABG procedure. Pt placed on vent without issues, pt currently on S(cmv) rr 16 vt 500 fio2 40% peep 6. Will continue to monitor pt per resp protocol.   Vent Information   Vent ID [id number]   Vent type Woods C3   Woods Vent Mode (S)CMV   $ Vent Charge-INITIAL Yes   Ventilator Start Yes   $ Pulse Oximetry Spot Check Charge Completed   SpO2 99 %   (S)CMV Settings   Resp Rate (BPM) 16 BPM   VT (mL) 500 mL   FIO2 (%) 40 %   PEEP (cmH2O) 6 cmH2O   I:E Ratio 1:2.8   Insp Time (%) 1 %   Flow Trigger (LPM) 3   Humidification Heat and moisture exchanger   (S)CMV Actuals   Resp Rate (BPM) 16 BPM   VT (mL) 507   MV 7.8   MAP (cmH2O) 8.7 cmH2O   Peak Pressure (cmH2O) 15 cmH2O   I:E Ratio (Obs) 1:2.8   Insp Resistance 13   Heater Temperature (Obs) 98.6 °F (37 °C)   (S)CMV Alarms   High Peak Pressure (cmH2O) 40   Low Pressure (cmH2O) 5 cm H2O   High Resp Rate (BPM) 40 BPM   Low Resp Rate (BPM) 8 BPM   High MV (L/min) 20 L/min   Low MV (L/min) 4 L/min   High VT (mL) 1000 mL   Low VT (mL) 250 mL   Apnea Time (s) 20 S   Maintenance   Alarm (pink) cable attached No   Resuscitation bag with peep valve at bedside Yes   Water bag changed No   Circuit changed No   ETT  Hi-Lo;Cuffed;Oral 8.5 mm   Placement Date/Time: 02/15/24 0720   Mask Ventilation: (c) Ventilated by mask with oral airway (2)  Preoxygenated: Yes  Technique: Direct laryngoscopy  Type: Hi-Lo;Cuffed;Oral  Tube Size: 8.5 mm  Laryngoscope: Mac  Blade Size: 3  Location: Oral  Grade...   Secured at (cm) 23   Measured from Lips   Secured Location Right   Secured by Commercial tube vences   Cuff Pressure (color) Green   HI-LO Suction  Intermittent suction   HI-LO Secretions Scant   HI-LO Intervention Patent

## 2024-02-15 NOTE — PROGRESS NOTES
Patient continues admission at [facility]; Advanced Heart Failure Census.     Outpatient Advanced Heart Failure LCSW completed electronic chart review and rounded with the HF Team. Informed Dr. Jimenez that LCSW referred pt to  Financial Counselor/CAMILA for PA MA application. Pt currently has no insurance.     Referral for OP Advanced Heart Failure Social Work can be entered after discharge from hospital setting.

## 2024-02-15 NOTE — ANESTHESIA PROCEDURE NOTES
Procedure Performed: JUAN Anesthesia  Start Time:  2/15/2024 8:25 AM        Preanesthesia Checklist    Patient identified, IV assessed, risks and benefits discussed, monitors and equipment assessed, procedure being performed at surgeon's request and anesthesia consent obtained.      Procedure    Diagnostic Indications for JUAN:  assessment of ascending aorta, assessment of surgical repair, defect repair evaluation and hemodynamic monitoring. Type of JUAN: complete JUAN with interpretation. Images Saved: ultrasound permanent image saved. Physician Requesting Echo: AGNES Shaw MD.  Location performed: OR. Intubated.  Heart visualized. Insertion of JUAN Probe:  Atraumatic. Probe Type:  Epiaortic. Modalities:  Continuous wave Doppler, color flow mapping, 3D and pulse wave Doppler.      Echocardiographic and Doppler Measurements    PREPROCEDURE    LEFT VENTRICLE:  Systolic Function: severely impaired. Ejection Fraction: 25%. Cavity size: dilated. Diastolic dimension (cm): 6.7. Posterior wall dimensions: .9.     RIGHT VENTRICLE:  Systolic Function: moderately impaired.  Cavity size mildly dilated.              AORTIC VALVE:  Leaflets: trileaflet. Leaflet motions normal and normal. Stenosis: none.     Regurgitation: none.      MITRAL VALVE:  Leaflets: normal. Leaflet Motions: restricted and posterior leaflet restricted/pseudoprolaspse of anterior leaflet. Regurgitation: severe and severe with SBP >= 130 mmHg; posteriorly directed jet with Choanda effect.          TRICUSPID VALVE:  Leaflets: normal. Leaflet Motions: normal.  Regurgitation: mild.              ASCENDING AORTA:    Dissection not present.      AORTIC ARCH:    dissection not present. Grade 3: atheroma protruding < 0.5 cm into lumen.    DESCENDING AORTA:    Dissection not present. Grade 3: atheroma protruding < 0.5 cm into lumen.    OTHER AORTIC FINDINGS:   IABP ~ 2 cm distal left subclavian artery.                OTHER ATRIAL FINDINGS:  No JOSE clot; low  flow at JOSE os.    ATRIAL SEPTUM:  Intra-atrial septal morphology: no PFO by CFD.              EPIAORTIC:  Plaque Thickness: 0-5 mm.        POSTPROCEDURE    LEFT VENTRICLE:   Systolic Function: severely depressed. Ejection Fraction: 30 %.      RIGHT VENTRICLE: Unchanged .           AORTIC VALVE: Unchanged .           MITRAL VALVE:   Leaflets: ring. Regurgitation: none.  Mean Gradient: 3.    TRICUSPID VALVE: Unchanged .                    AORTA: Unchanged .  Dissection: Dissection not present.  OTHER AORTA FINDINGS: IABP tip ~2 cm distal to left subclavian artery.

## 2024-02-15 NOTE — ANESTHESIA PROCEDURE NOTES
Introducer/Davis-Elidia    Performed by: Raul Huitron MD  Authorized by: Raul Huitron MD    Date/Time: 2/15/2024 8:15 AM  Consent: Verbal consent obtained. Written consent obtained.  Risks and benefits: risks, benefits and alternatives were discussed  Consent given by: patient  Patient understanding: patient states understanding of the procedure being performed  Patient consent: the patient's understanding of the procedure matches consent given  Procedure consent: procedure consent matches procedure scheduled  Relevant documents: relevant documents present and verified  Required items: required blood products, implants, devices, and special equipment available  Patient identity confirmed: arm band  Indications: vascular access and central pressure monitoring  Location details: right internal jugular  Catheter size: 9 Fr  Patient position: Trendelenburg  Anesthesia method: ga.  Assessment: blood return through all ports and free fluid flow  Preparation: skin prepped with ChloraPrep  Skin prep agent dried: skin prep agent completely dried prior to procedure  Sterile barriers: all five maximum sterile barriers used - cap, mask, sterile gown, sterile gloves, and large sterile sheet  Hand hygiene: hand hygiene performed prior to central venous catheter insertion  Ultrasound guidance: yes  ultrasound permanent image saved  Site selection rationale: cabg/mvr/iabp  Number of attempts: 1  Successful placement: yes  Post-procedure: line sutured and dressing applied  Patient tolerance: patient tolerated the procedure well with no immediate complications

## 2024-02-15 NOTE — PROGRESS NOTES
"[facility]  Interval Progress Note: Critical Care  Name: Rohan Don I  MRN: [id number]  Unit/Bed#: [facility] 416-01 I Date of Admission: 2/9/2024   Date of Service: 2/15/2024 I Hospital Day: 6    Interval Events:  Called to bedside by nursing for audible leak of air from patient's mouth around ET. Ventilator alarming \"disconnection on patient side.\" O2 sat stable in high 90s. With BVM, minimal chest rise noted. ET suspected to be above vocal cords. Respiratory and Dr. Lynn at bedside shortly thereafter. Airway cart at bedside. Bronchoscope advanced into ET by Dr. Lynn and ET depth noted to be well above the yandy. ET advanced under bronchoscopic guidance to 27cm at the lip. Immediate improvement in delivered TV via ventilator and audible leak of air ceased. Continued plan to wean sedation with goal of extubation.     Assessment and Plan  See above    SIGNATURE: Rodney Duncan PA-C  "

## 2024-02-15 NOTE — RESTORATIVE TECHNICIAN NOTE
Restorative Technician Note      Patient Name: Rohan Don     Restorative Tech Visit Date: 02/15/24  Note Type: Mobility  Patient Position Upon Consult: Bedside chair  Activity Performed: Ambulated  Assistive Device: Other (Comment) (none)  Patient Position at End of Consult: All needs within reach; Other (comment) (on the stretcher)

## 2024-02-15 NOTE — PROGRESS NOTES
"    INTERNAL MEDICINE RESIDENCY PROGRESS NOTE     Name: Rohan Don   Age & Sex: 63 y.o. male   MRN: [id number]  Unit/Bed#: OR POOL   Encounter: [id number]  Team: SOD Team A    PATIENT INFORMATION     Name: Rohan Don   Age & Sex: 63 y.o. male   MRN: [id number]  Hospital Stay Days: 6    ASSESSMENT/PLAN     Principal Problem:    Coronary artery disease  Active Problems:    Acute systolic congestive heart failure (HCC)    Prediabetes    DEISI (acute kidney injury) (HCC)    Elevated transaminase level    Vitamin D deficiency    HTN (hypertension)    Pleural effusion    Severe protein-calorie malnutrition (HCC)    Nodule of upper lobe of right lung    Renal calculi    S/P CABG x 2      Acute systolic congestive heart failure (HCC)  Assessment & Plan  Echo (2/10/24) found LVEF of 20%. +BNP on admission 2788.     Plan:  Heart failure following; appreciate recs  Euvolemic  Continue 40 mg PO Lasix daily  Continue to monitor I/Os and weights  Continue Losartan 25 mg daily and Metoprolol 25 mg BID  Will start Spironolactone 25mg QD prior to discharge  Respiratory protocol, maintain O2 sat >88%  Cardiac diet    * Coronary artery disease  Assessment & Plan  Cardiac cath (2/12/24) showed prox LAD and ostial RPDA disease with small-vessel OM1 lesion as well in the prox segment.    Plan:  CT surgery consulted for CABG eval - CABG +/- MVR today (2/15)  Continue Aspirin 81mg daily and Atorvastatin 40mg daily  SubQ heparin    Renal calculi  Assessment & Plan  CT chest/abd/pelvis (2/12) obtained for CABG pre-op eval revealed an \"bilateral mid renal subcentimeter hyperdense foci, with features suggesting small calyceal diverticula, although calcifications or hyperdense lesions cannot be entirely excluded.\" Patient denies flank pain, hematuria, dysuria, or any other urinary symptoms.    Plan:  Consider renal ultrasound may be useful for further evaluation.  Recommend outpatient follow up    Nodule of upper lobe of right " "lung  Assessment & Plan  CT chest/abd/pelvis (2/12) obtained for CABG pre-op eval revealed an \"irregular non-mass-like density in the right upper lobe measuring 1.4 cm, with morphology suggesting an infectious/inflammatory etiology or scarring. However, malignancy cannot be excluded.\" No smoking history. 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    Plan:  Pulmonology consulted; will follow up as outpatient and repeat imaging.     Severe protein-calorie malnutrition (HCC)  Assessment & Plan  Malnutrition Findings:   Adult Malnutrition type: Chronic illness  Adult Degree of Malnutrition: Other severe protein calorie malnutrition  Malnutrition Characteristics: Fat loss, Muscle loss, Inadequate energy, Weight loss, Fluid accumulation            360 Statement: Other severe protein calorie malnutrition related to decreased appetite as evidenced by severe fat and muscle loss, 17.9% weight loss X 5 months. Treated with nutritional supplements    BMI Findings:  Adult BMI Classifications: Underweight < 18.5        Body mass index is 17.13 kg/m².     Plan:  Nutrition consulted - added 1 Van Glucerna at breakfast, Berry Magic Cup at lunch and van Ensure Plus High Protein at dinner as compromise to not increase BG levels but still provide adequate kcal/pro intake.     Pleural effusion  Assessment & Plan  CT chest/abd/pelvis (2/12/24) showed moderate bilateral pleural effusions and bibasilar atelectasis. Likely 2/2 CHF    Plan:  Refer to congestive CHF A&P    HTN (hypertension)  Assessment & Plan  Managed with diet/exercise per patient and previously has been well controlled  BP on admission largely normotensive    Plan:  Currently on Losartan 25 mg daily and Metoprolol 25 mg BID  Will start Spironolactone 25mg QD prior to discharge  Heart failure following; appreciate recs  Goal normotension and MAP>65 mmHg while diuresing    Elevated transaminase level  Assessment & Plan  Noted on 2/5/24 labs " (4 days prior to admission). Suspect congestive hepatopathy from CHF vs. DILI from 3 weeks of abx vs. rarer etiologies such as hemachromatosis    Plan:  F/U daily CMP  Consider adding viral hepatitis screen  Depending on results of repeat labs, can consider RUQ U/S +/- hepatitis labs for chronic exposures    DEISI (acute kidney injury) (HCC)  Assessment & Plan  Recent Labs     24  0505 24  0534 02/15/24  0533   CREATININE 1.15 0.96 0.90   EGFR 67 84 91     Estimated Creatinine Clearance: 67.6 mL/min (by C-G formula based on SCr of 0.9 mg/dL).  Presumably DEISI as Cr >1.0 by greater than 25% per KDIGO  Suspect prerenal due to CHF as supported by abnormal LFTs (hepatic congestion)    Plan:  Continue to monitor kidney function  Avoid nephrotoxins as able  Outpatient BMP follow up in 3+ months to determine if component of CKD present    Prediabetes  Assessment & Plan  Managed w/[facility] in [place], [place] (patient's prior residence) with diet/exercise    A1c is 6.1        Disposition: Continue inpatient management    SUBJECTIVE     Patient seen and examined. No acute events overnight. Feeling well this morning with no complaints. Scheduled for CABG +/- MV repair today.    OBJECTIVE     Vitals:    24 2116 24 2346 02/15/24 0255 02/15/24 0553   BP: 107/73 106/74 104/73    BP Location:       Pulse: 102 94 89    Resp:  16 16    Temp:  (!) 97.3 °F (36.3 °C) 97.5 °F (36.4 °C)    TempSrc:   Oral    SpO2:  94% 95%    Weight:    56.2 kg (123 lb 14.4 oz)   Height:          Temperature:   Temp (24hrs), Av.6 °F (36.4 °C), Min:97.3 °F (36.3 °C), Max:97.8 °F (36.6 °C)    Temperature: 97.5 °F (36.4 °C)  Intake & Output:  I/O          0701   0700  0701  02/15 0700 02/15 0701   0700    P.O. 1632 1270     Total Intake(mL/kg) 1632 (29.1) 1270 (22.6)     Urine (mL/kg/hr) 1675 (1.2)      Total Output 1675      Net -43 +1270            Unmeasured Urine Occurrence  3 x           Weights:   IBW (Ideal Body  Weight): 75.3 kg    Body mass index is 17.28 kg/m².  Weight (last 2 days)       Date/Time Weight    02/15/24 0553 56.2 (123.9)    02/14/24 0600 56.1 (123.68)    02/13/24 0532 55.7 (122.8)          Physical Exam  Vitals and nursing note reviewed.   Constitutional:       General: He is not in acute distress.     Appearance: He is well-developed and underweight.   HENT:      Head: Normocephalic and atraumatic.   Eyes:      Conjunctiva/sclera: Conjunctivae normal.   Cardiovascular:      Rate and Rhythm: Normal rate and regular rhythm.      Heart sounds: No murmur heard.  Pulmonary:      Effort: Pulmonary effort is normal. No respiratory distress.      Breath sounds: Normal breath sounds.   Abdominal:      Palpations: Abdomen is soft.      Tenderness: There is no abdominal tenderness.   Musculoskeletal:         General: No swelling.      Cervical back: Neck supple.      Right lower leg: No edema.      Left lower leg: No edema.   Skin:     General: Skin is warm and dry.      Capillary Refill: Capillary refill takes less than 2 seconds.   Neurological:      General: No focal deficit present.      Mental Status: He is alert and oriented to person, place, and time. Mental status is at baseline.   Psychiatric:         Mood and Affect: Mood normal.       LABORATORY DATA     Labs: I have personally reviewed pertinent reports.  Results from last 7 days   Lab Units 02/15/24  0533 02/14/24  0534 02/13/24  0505 02/12/24  0544 02/11/24  0748 02/10/24  0517   WBC Thousand/uL 8.66 6.19 6.75 6.46 6.68 9.35   HEMOGLOBIN g/dL 16.7 16.5 17.4* 15.5 16.0 15.3   HEMATOCRIT % 53.1* 51.1* 52.5* 49.1 47.3 47.6   PLATELETS Thousands/uL 281 274 280 266 245 248   NEUTROS PCT %  --   --   --  65 62 66   MONOS PCT %  --   --   --  12 12 10   EOS PCT %  --   --   --  4 5 3      Results from last 7 days   Lab Units 02/15/24  0533 02/14/24  0534 02/13/24  0505   POTASSIUM mmol/L 4.3 3.7 3.9   CHLORIDE mmol/L 105 102 99   CO2 mmol/L 21 24 32   BUN mg/dL  25 29* 25   CREATININE mg/dL 0.90 0.96 1.15   CALCIUM mg/dL 9.5 9.6 9.7   ALK PHOS U/L 62 63 60   ALT U/L 81* 79* 73*   AST U/L 47* 51* 35     Results from last 7 days   Lab Units 02/12/24  0544 02/10/24  0517   MAGNESIUM mg/dL 1.9 2.0     Results from last 7 days   Lab Units 02/12/24  0544   PHOSPHORUS mg/dL 4.5*          Results from last 7 days   Lab Units 02/09/24  1925   LACTIC ACID mmol/L 1.9           IMAGING & DIAGNOSTIC TESTING     Radiology Results: I have personally reviewed pertinent reports.  MRI cardiac  w wo contrast    Result Date: 2/13/2024  Impression: Impression: 1. Severely dilated left ventricle with severely reduced left ventricular systolic function. 2. Moderately dilated right ventricle with moderately reduced right ventricular systolic function. 3. Likely mild mitral regurgitation 4. Bilateral pleural effusions. 5. Subendocardial scarring of the entire inferoseptal, inferior, and inferolateral walls suggestive of moderate myocardial viability in these territories.  The remaining myocardial segments appear completely viable. Workstation performed: [id number]     CT chest abdomen pelvis wo contrast    Result Date: 2/13/2024  Impression: 1. Moderate bilateral pleural effusions and bibasilar atelectasis. 2. Irregular non-mass-like density in the right upper lobe measuring 1.4 cm, with morphology suggesting an infectious/inflammatory etiology or scarring. However, malignancy cannot be excluded. PET/CT versus 3-month follow-up chest CT is recommended. 3. Bilateral mid renal subcentimeter hyperdense foci, with features suggesting small calyceal diverticula, although calcifications or hyperdense lesions cannot be entirely excluded. Renal ultrasound may be useful for further evaluation. The study was marked in EPIC for immediate notification. Workstation performed: [id number]     XR chest pa & lateral    Result Date: 2/9/2024  Impression: Moderate bilateral pleural effusions with bibasilar atelectasis  and/or airspace disease noted. Workstation performed: [id number]     Other Diagnostic Testing: I have personally reviewed pertinent reports.    ACTIVE MEDICATIONS     Current Facility-Administered Medications   Medication Dose Route Frequency    [Transfer Hold] acetaminophen (TYLENOL) tablet 650 mg  650 mg Oral Q4H PRN    [Transfer Hold] aluminum-magnesium hydroxide-simethicone (MAALOX) oral suspension 30 mL  30 mL Oral Q6H PRN    [Transfer Hold] aspirin (ECOTRIN LOW STRENGTH) EC tablet 81 mg  81 mg Oral Daily    [Transfer Hold] atorvastatin (LIPITOR) tablet 40 mg  40 mg Oral Daily With Dinner    ceFAZolin (ANCEF) IVPB (premix in dextrose) 2,000 mg 50 mL  2,000 mg Intravenous Once    chlorhexidine (PERIDEX) 0.12 % oral rinse 15 mL  15 mL Swish & Spit Q12H JENN    [Transfer Hold] furosemide (LASIX) tablet 40 mg  40 mg Oral Daily    [Transfer Hold] heparin (porcine) subcutaneous injection 5,000 Units  5,000 Units Subcutaneous Q8H JENN    [Transfer Hold] insulin lispro (HumaLOG) 100 units/mL subcutaneous injection 1-5 Units  1-5 Units Subcutaneous TID AC    [Transfer Hold] insulin lispro (HumaLOG) 100 units/mL subcutaneous injection 1-5 Units  1-5 Units Subcutaneous HS    [Transfer Hold] melatonin tablet 3 mg  3 mg Oral HS    [Transfer Hold] metoprolol succinate (TOPROL-XL) 24 hr tablet 25 mg  25 mg Oral BID    [Transfer Hold] ondansetron (ZOFRAN) injection 4 mg  4 mg Intravenous Q6H PRN    [Transfer Hold] oxyCODONE-acetaminophen (PERCOCET) 5-325 mg per tablet 1 tablet  1 tablet Oral Q4H PRN    [Transfer Hold] polyethylene glycol (MIRALAX) packet 17 g  17 g Oral Daily    [Transfer Hold] senna (SENOKOT) tablet 8.6 mg  1 tablet Oral Daily    [Transfer Hold] spironolactone (ALDACTONE) tablet 25 mg  25 mg Oral Daily       VTE Pharmacologic Prophylaxis: Heparin  VTE Mechanical Prophylaxis: sequential compression device    Portions of the record may have been created with voice recognition software.  Occasional wrong word or  "\"sound a like\" substitutions may have occurred due to the inherent limitations of voice recognition software.  Read the chart carefully and recognize, using context, where substitutions have occurred.  ==  Juana Shah MD  [facility]  Internal Medicine Residency PGY-1  "

## 2024-02-15 NOTE — CONSULTS
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   ATC tylenol, PRN oxycodone for pain  Trend neuro exam  Delirium precautions    CV:   Goals:   Cardiac Surgery Hemodynamic Monitoring goals: MAP goal >65 CI >2.2 Continue pulmonary artery catheter for hemodynamic monitoring  Continue central line due to vasoactive medications Continue arterial line for blood pressure monitoring and/or frequent blood gas draws  Volume resuscitation as needed.   Epicardial pacing wire plan : Epicardial pacing wires in place. Will pace as needed for bradycardia or cardiac output   Monitor rhythm on telemetry.        Lung:   Check STAT post-op ABG and CXR  Wean vent with spontaneous breathing trial with goal to extubate today    GI:   GI prophylaxis with PPI  Bowel regimen  Zofran PRN for nausea.     FEN:   NPO Replenish K >4.0, mag >2.0 and calcium >7.0.     :   Check STAT post-op BMP  Davis in place.   Monitor UOP with goal >0.5cc/kg/hour.  Lasix versus volume resuscitate as needed depending on hemodynamics and volume status.    ID:   Prophylactic post-op abx. Maintain normothermia. Trend temps.     Heme:   Check STAT post-op H/H and platelets.  Monitor incision site, invasive lines, and chest tube outputs for bleeding. Send coag panel if needed.    Endo:   Insulin gtt for blood sugar control.     Disposition: ICU Care        Subjective     HPI: Rohan Don is a 63 y.o. male with PMH HTN who presented to urgent care a few weeks  ago with cough and SOB at which time he was treated for pneumonia. His symptoms persisted and began to include leg swelling and fatigue. He had an outpatient echo performed which showed LVEF 20-25%. He was subsequently admitted to [facility] with acute systolic HF. He was found to have moderate to severe MR. Cardiac cath demonstrated MVCAD so CT surgery was consulted and recommended CABG with possible MVR, supported by IABP. He arrives to ICU today s/p CABG x2, MVR, and IABP placement. He arrives supported on Epi 2, Milrinone 0.5, Vaso 0.08, Levo 4.     Intra-op JUAN LVEF 20-25% pre-bypass, 30% post-bypass    ROS: ROS unable to be obtained due to patient being intubated and sedated.     History obtained from chart review due to patient being intubated and sedated.        Objective          Vitals: Invasive Monitoring      /73   Pulse 93   Resp 16   O2 Sat 98 %   O2 Device Nasal cannula   Temp (!) 95.6 °F (35.3 °C)    Arterial Line  Pony BP 99/47  Arterial Line BP  Min: 96/46  Max: 99/47   MAP 69 mmHg  Arterial Line MAP (mmHg)  Min: 67 mmHg  Max: 69 mmHg     PA Catheter   Most Recent  Min/Max in 24hrs    PAP 31/20 PAP  Min: 31/20  Max: 34/17   CVP 11 mmHg CVP (mean)  Min: 11 mmHg  Max: 11 mmHg   CI    No data recorded   SVR 1124 (dyne*sec)/cm5 SVR (dyne*sec)/cm5  Min: 868 (dyne*sec)/cm5  Max: 1124 (dyne*sec)/cm5          Physical Exam   Physical Exam  Vitals reviewed.   Skin:     General: Skin is cool and dry.      Capillary Refill: Capillary refill takes 2 to 3 seconds.   Neck:      Vascular: Central line present.   Cardiovascular:      Rate and Rhythm: Normal rate and regular rhythm.   Abdominal:      Palpations: Abdomen is soft.   Constitutional:       Appearance: He is ill-appearing.      Interventions: He is intubated and restrained.   Pulmonary:      Effort: No respiratory distress. He is intubated.   Chest:      Comments: Incisions and lines clean and dry  Genitourinary/Anorectal:  Davis present.         Diagnostic Studies      02/15/24 CXR: Lines and tubes in appropriate position. No PTX. IABP appropriate position.   This was personally reviewed by myself in PACS.  02/15/24 EKG: NSR 90s.   This was personally reviewed by myself.       Medications:  Scheduled PRN   acetaminophen, 650 mg, Q6H While awake  amiodarone, 200 mg, Q8H JENN  aspirin, 325 mg, Daily  atorvastatin, 80 mg, Daily With Dinner  cefazolin, 2,000 mg, Q8H  chlorhexidine, 15 mL, BID  fentanyl citrate (PF), 50 mcg, Once  [START ON 2/16/2024] heparin (porcine), 5,000 Units, Q8H JENN  magnesium sulfate, 2 g, Once  melatonin, 3 mg, HS  mupirocin, 1 Application, Q12H JENN  pantoprazole, 40 mg, Daily  polyethylene glycol, 17 g, Daily      acetaminophen, 650 mg, Q4H PRN  bisacodyl, 10 mg, Daily PRN  calcium chloride, 1 g, Once PRN  fentanyl citrate (PF), 50 mcg, Q1H PRN  HYDROmorphone, 0.5 mg, Q2H PRN  lactated ringers, 500 mL, Once PRN  ondansetron, 4 mg, Q6H PRN  oxyCODONE, 2.5 mg, Q4H PRN   Or  oxyCODONE, 5 mg, Q4H PRN  potassium chloride, 20 mEq, Once PRN  potassium chloride, 20 mEq, Q30 Min PRN  potassium chloride, 40 mEq, Q1H PRN       Continuous    epinephrine, 1-10 mcg/min, Last Rate: 2 mcg/min (02/15/24 1415)  insulin regular (HumuLIN R,NovoLIN R) 1 Units/mL in sodium chloride 0.9 % 100 mL infusion, 0.3-21 Units/hr, Last Rate: Stopped (02/15/24 1354)  milrinone (Primacor) infusion, 0.5 mcg/kg/min, Last Rate: 0.5 mcg/kg/min (02/15/24 1444)  niCARdipine, 2.5-15 mg/hr  norepinephrine, 1-30 mcg/min, Last Rate: 5 mcg/min (02/15/24 1415)  phenylephine,  mcg/min  sodium chloride, 20 mL/hr, Last Rate: 20 mL/hr (02/15/24 1357)  vasopressin, 0.08 Units/min, Last Rate: 0.08 Units/min (02/15/24 1443)         Labs:  CBC    Recent Labs     02/14/24  0534 02/15/24  0533 02/15/24  0822 02/15/24  1134 02/15/24  1147 02/15/24  1246 02/15/24  1353   WBC 6.19 8.66  --   --   --   --   --    HGB 16.5 16.7   < >  --    < > 11.9* 11.9*   HCT 51.1* 53.1*   < >  --     < > 35* 35*    281  --  206  --   --   --     < > = values in this interval not displayed.     BMP    Recent Labs     02/15/24  0533 02/15/24  0822 02/15/24  1348 02/15/24  1353   SODIUM 136  --  142  --    K 4.3  --  3.6  --      --  113*  --    CO2 21   < > 20* 21   AGAP 10  --  9  --    BUN 25  --  21  --    CREATININE 0.90  --  0.92  --    CALCIUM 9.5  --  8.8  --     < > = values in this interval not displayed.       Coags    Recent Labs     02/15/24  1348   INR 1.45*   PTT 30        Additional Electrolytes  Recent Labs     02/15/24  1246 02/15/24  1353   CAIONIZED 1.50* 1.28          Blood Gas    No recent results  No recent results LFTs  Recent Labs     02/14/24  0534 02/15/24  0533   ALT 79* 81*   AST 51* 47*   ALKPHOS 63 62   ALB 3.6 3.8   TBILI 0.64 0.86       Infectious    No recent results     No recent results Glucose  Recent Labs     02/14/24  0534 02/15/24  0533 02/15/24  1348   GLUC 142* 114 101          Invasive lines and devices:  Invasive Devices       Central Venous Catheter Line  Duration             CVC Central Lines 02/15/24 <1 day    Introducer 02/15/24 <1 day              Peripheral Intravenous Line  Duration             Peripheral IV 02/13/24 Right Antecubital 2 days    Peripheral IV 02/15/24 Left Arm <1 day              Arterial Line  Duration             Arterial Line 02/15/24 Right Brachial <1 day              Line  Duration             IABP 8.0 Fr. 50 mL <1 day    Pacer Wires <1 day    Pacer Wires <1 day              Drain  Duration             Chest Tube 1 Left Pleural 32 Fr. <1 day    Chest Tube 2 Mediastinal;Posterior 32 Fr. <1 day    Chest Tube 3 Mediastinal;Anterior 32 Fr. <1 day    Urethral Catheter Non-latex;Straight-tip 16 Fr. <1 day              Airway  Duration             ETT  Hi-Lo;Cuffed;Oral 8.5 mm <1 day                     Historical Information   No past medical history on file. Past Surgical History:  2/12/2024: CARDIAC CATHETERIZATION; N/A      Comment:   Procedure: Cardiac catheterization;  Surgeon: Shanon Pal DO;  Location: [facility];  Service:                Cardiology  2/12/2024: CARDIAC CATHETERIZATION; Left      Comment:  Procedure: Cardiac Left Heart Cath;  Surgeon: Shanon Pal DO;  Location: [facility];  Service:                Cardiology  2/12/2024: CARDIAC CATHETERIZATION; N/A      Comment:  Procedure: Cardiac Coronary Angiogram;  Surgeon:                Shanon Pal DO;  Location: [facility];                 Service: Cardiology   Current Outpatient Medications   Medication Instructions    Empagliflozin (JARDIANCE) 10 mg, Oral, Daily    sacubitril-valsartan (Entresto) 24-26 MG TABS 1 tablet, Oral, 2 times daily    No Known Allergies   Social History     Tobacco Use    Smoking status: Never    Smokeless tobacco: Never   Substance Use Topics    Alcohol use: Yes    History reviewed. No pertinent family history.       SIGNATURE: Rodney Duncan PA-C

## 2024-02-16 ENCOUNTER — APPOINTMENT (INPATIENT)
Dept: RADIOLOGY | Facility: HOSPITAL | Age: 64
DRG: 163 | End: 2024-02-16
Attending: ANESTHESIOLOGY
Payer: COMMERCIAL

## 2024-02-16 ENCOUNTER — APPOINTMENT (INPATIENT)
Dept: RADIOLOGY | Facility: HOSPITAL | Age: 64
DRG: 163 | End: 2024-02-16
Payer: COMMERCIAL

## 2024-02-16 LAB
ABO GROUP BLD BPU: NORMAL
ALBUMIN SERPL BCP-MCNC: 3.1 G/DL (ref 3.5–5)
ALP SERPL-CCNC: 36 U/L (ref 34–104)
ALT SERPL W P-5'-P-CCNC: 48 U/L (ref 7–52)
ANION GAP SERPL CALCULATED.3IONS-SCNC: 4 MMOL/L
ANION GAP SERPL CALCULATED.3IONS-SCNC: 7 MMOL/L
ANION GAP SERPL CALCULATED.3IONS-SCNC: 8 MMOL/L
ARTERIAL PATENCY WRIST A: NO
AST SERPL W P-5'-P-CCNC: 57 U/L (ref 13–39)
ATRIAL RATE: 83 BPM
ATRIAL RATE: 93 BPM
ATRIAL RATE: 94 BPM
BASE EX.OXY STD BLDV CALC-SCNC: 68.9 % (ref 60–80)
BASE EX.OXY STD BLDV CALC-SCNC: 73.5 % (ref 60–80)
BASE EXCESS BLDA CALC-SCNC: -0.9 MMOL/L
BASE EXCESS BLDV CALC-SCNC: -0.9 MMOL/L
BASE EXCESS BLDV CALC-SCNC: -2.8 MMOL/L
BILIRUB SERPL-MCNC: 1.07 MG/DL (ref 0.2–1)
BODY TEMPERATURE: 100.7 DEGREES FEHRENHEIT
BODY TEMPERATURE: 100.7 DEGREES FEHRENHEIT
BPU ID: NORMAL
BUN SERPL-MCNC: 20 MG/DL (ref 5–25)
BUN SERPL-MCNC: 20 MG/DL (ref 5–25)
BUN SERPL-MCNC: 23 MG/DL (ref 5–25)
CALCIUM ALBUM COR SERPL-MCNC: 8.5 MG/DL (ref 8.3–10.1)
CALCIUM SERPL-MCNC: 7.8 MG/DL (ref 8.4–10.2)
CALCIUM SERPL-MCNC: 7.8 MG/DL (ref 8.4–10.2)
CALCIUM SERPL-MCNC: 8.1 MG/DL (ref 8.4–10.2)
CHLORIDE SERPL-SCNC: 109 MMOL/L (ref 96–108)
CHLORIDE SERPL-SCNC: 111 MMOL/L (ref 96–108)
CHLORIDE SERPL-SCNC: 112 MMOL/L (ref 96–108)
CO2 SERPL-SCNC: 22 MMOL/L (ref 21–32)
CO2 SERPL-SCNC: 22 MMOL/L (ref 21–32)
CO2 SERPL-SCNC: 26 MMOL/L (ref 21–32)
CREAT SERPL-MCNC: 1.1 MG/DL (ref 0.6–1.3)
CREAT SERPL-MCNC: 1.11 MG/DL (ref 0.6–1.3)
CREAT SERPL-MCNC: 1.15 MG/DL (ref 0.6–1.3)
CROSSMATCH: NORMAL
ERYTHROCYTE [DISTWIDTH] IN BLOOD BY AUTOMATED COUNT: 13.7 % (ref 11.6–15.1)
GFR SERPL CREATININE-BSD FRML MDRD: 67 ML/MIN/1.73SQ M
GFR SERPL CREATININE-BSD FRML MDRD: 70 ML/MIN/1.73SQ M
GFR SERPL CREATININE-BSD FRML MDRD: 71 ML/MIN/1.73SQ M
GLUCOSE SERPL-MCNC: 102 MG/DL (ref 65–140)
GLUCOSE SERPL-MCNC: 111 MG/DL (ref 65–140)
GLUCOSE SERPL-MCNC: 128 MG/DL (ref 65–140)
GLUCOSE SERPL-MCNC: 130 MG/DL (ref 65–140)
GLUCOSE SERPL-MCNC: 131 MG/DL (ref 65–140)
GLUCOSE SERPL-MCNC: 134 MG/DL (ref 65–140)
GLUCOSE SERPL-MCNC: 136 MG/DL (ref 65–140)
GLUCOSE SERPL-MCNC: 142 MG/DL (ref 65–140)
GLUCOSE SERPL-MCNC: 145 MG/DL (ref 65–140)
GLUCOSE SERPL-MCNC: 149 MG/DL (ref 65–140)
GLUCOSE SERPL-MCNC: 152 MG/DL (ref 65–140)
GLUCOSE SERPL-MCNC: 154 MG/DL (ref 65–140)
GLUCOSE SERPL-MCNC: 159 MG/DL (ref 65–140)
GLUCOSE SERPL-MCNC: 160 MG/DL (ref 65–140)
GLUCOSE SERPL-MCNC: 202 MG/DL (ref 65–140)
GLUCOSE SERPL-MCNC: 243 MG/DL (ref 65–140)
HCO3 BLDA-SCNC: 22.1 MMOL/L (ref 22–28)
HCO3 BLDV-SCNC: 23 MMOL/L (ref 24–30)
HCO3 BLDV-SCNC: 23.8 MMOL/L (ref 24–30)
HCT VFR BLD AUTO: 38.9 % (ref 36.5–49.3)
HGB BLD-MCNC: 12.4 G/DL (ref 12–17)
HGB BLD-MCNC: 12.8 G/DL (ref 12–17)
HOROWITZ INDEX BLDA+IHG-RTO: 40 MM[HG]
HOROWITZ INDEX BLDA+IHG-RTO: 40 MM[HG]
MAGNESIUM SERPL-MCNC: 2.5 MG/DL (ref 1.9–2.7)
MCH RBC QN AUTO: 29.6 PG (ref 26.8–34.3)
MCHC RBC AUTO-ENTMCNC: 32.9 G/DL (ref 31.4–37.4)
MCV RBC AUTO: 90 FL (ref 82–98)
NASAL CANNULA: 4
O2 CT BLDA-SCNC: 18.9 ML/DL (ref 16–23)
O2 CT BLDV-SCNC: 12.9 ML/DL
O2 CT BLDV-SCNC: 14.2 ML/DL
OXYHGB MFR BLDA: 97.8 % (ref 94–97)
P AXIS: 72 DEGREES
P AXIS: 77 DEGREES
P AXIS: 79 DEGREES
PCO2 BLD: 42.2 MM HG (ref 42–50)
PCO2 BLDA: 32 MM HG (ref 36–44)
PCO2 BLDV: 40 MM HG (ref 42–50)
PCO2 BLDV: 44 MM HG (ref 42–50)
PCO2 TEMP ADJ BLDA: 33.7 MM HG (ref 36–44)
PEEP RESPIRATORY: 6 CM[H2O]
PEEP RESPIRATORY: 6 CM[H2O]
PH BLD: 7.38 [PH] (ref 7.3–7.4)
PH BLD: 7.44 [PH] (ref 7.35–7.45)
PH BLDA: 7.46 [PH] (ref 7.35–7.45)
PH BLDV: 7.34 [PH] (ref 7.3–7.4)
PH BLDV: 7.39 [PH] (ref 7.3–7.4)
PLATELET # BLD AUTO: 141 THOUSANDS/UL (ref 149–390)
PMV BLD AUTO: 9.5 FL (ref 8.9–12.7)
PO2 BLD: 184.2 MM HG (ref 75–129)
PO2 BLDA: 177.5 MM HG (ref 75–129)
PO2 BLDV: 39.2 MM HG (ref 35–45)
PO2 BLDV: 40.3 MM HG (ref 35–45)
PO2 VENOUS TEMP CORRECTED: 42.7 MM HG (ref 35–45)
POTASSIUM SERPL-SCNC: 4.7 MMOL/L (ref 3.5–5.3)
POTASSIUM SERPL-SCNC: 4.8 MMOL/L (ref 3.5–5.3)
POTASSIUM SERPL-SCNC: 4.9 MMOL/L (ref 3.5–5.3)
PR INTERVAL: 138 MS
PR INTERVAL: 170 MS
PR INTERVAL: 172 MS
PROT SERPL-MCNC: 4.9 G/DL (ref 6.4–8.4)
QRS AXIS: 94 DEGREES
QRS AXIS: 95 DEGREES
QRS AXIS: 96 DEGREES
QRSD INTERVAL: 84 MS
QRSD INTERVAL: 96 MS
QRSD INTERVAL: 96 MS
QT INTERVAL: 390 MS
QT INTERVAL: 408 MS
QT INTERVAL: 426 MS
QTC INTERVAL: 487 MS
QTC INTERVAL: 500 MS
QTC INTERVAL: 507 MS
RBC # BLD AUTO: 4.32 MILLION/UL (ref 3.88–5.62)
SODIUM SERPL-SCNC: 139 MMOL/L (ref 135–147)
SODIUM SERPL-SCNC: 140 MMOL/L (ref 135–147)
SODIUM SERPL-SCNC: 142 MMOL/L (ref 135–147)
SPECIMEN SOURCE: ABNORMAL
T WAVE AXIS: -76 DEGREES
T WAVE AXIS: -80 DEGREES
T WAVE AXIS: 90 DEGREES
UNIT DISPENSE STATUS: NORMAL
UNIT PRODUCT CODE: NORMAL
UNIT PRODUCT VOLUME: 350 ML
UNIT RH: NORMAL
VENT- AC: AC
VENT- AC: AC
VENTRICULAR RATE: 83 BPM
VENTRICULAR RATE: 93 BPM
VENTRICULAR RATE: 94 BPM
VT SETTING VENT: 500 ML
VT SETTING VENT: 500 ML
WBC # BLD AUTO: 14.71 THOUSAND/UL (ref 4.31–10.16)

## 2024-02-16 PROCEDURE — 82805 BLOOD GASES W/O2 SATURATION: CPT | Performed by: ANESTHESIOLOGY

## 2024-02-16 PROCEDURE — 80048 BASIC METABOLIC PNL TOTAL CA: CPT | Performed by: PHYSICIAN ASSISTANT

## 2024-02-16 PROCEDURE — 85018 HEMOGLOBIN: CPT | Performed by: ANESTHESIOLOGY

## 2024-02-16 PROCEDURE — 82805 BLOOD GASES W/O2 SATURATION: CPT | Performed by: PHYSICIAN ASSISTANT

## 2024-02-16 PROCEDURE — 85027 COMPLETE CBC AUTOMATED: CPT

## 2024-02-16 PROCEDURE — 94003 VENT MGMT INPAT SUBQ DAY: CPT

## 2024-02-16 PROCEDURE — 83735 ASSAY OF MAGNESIUM: CPT | Performed by: PHYSICIAN ASSISTANT

## 2024-02-16 PROCEDURE — 82948 REAGENT STRIP/BLOOD GLUCOSE: CPT

## 2024-02-16 PROCEDURE — 94760 N-INVAS EAR/PLS OXIMETRY 1: CPT

## 2024-02-16 PROCEDURE — 71045 X-RAY EXAM CHEST 1 VIEW: CPT

## 2024-02-16 PROCEDURE — 99024 POSTOP FOLLOW-UP VISIT: CPT | Performed by: THORACIC SURGERY (CARDIOTHORACIC VASCULAR SURGERY)

## 2024-02-16 PROCEDURE — 82805 BLOOD GASES W/O2 SATURATION: CPT | Performed by: NURSE PRACTITIONER

## 2024-02-16 PROCEDURE — 93010 ELECTROCARDIOGRAM REPORT: CPT | Performed by: INTERNAL MEDICINE

## 2024-02-16 PROCEDURE — 99232 SBSQ HOSP IP/OBS MODERATE 35: CPT | Performed by: STUDENT IN AN ORGANIZED HEALTH CARE EDUCATION/TRAINING PROGRAM

## 2024-02-16 PROCEDURE — 93005 ELECTROCARDIOGRAM TRACING: CPT

## 2024-02-16 PROCEDURE — 80053 COMPREHEN METABOLIC PANEL: CPT

## 2024-02-16 PROCEDURE — 99291 CRITICAL CARE FIRST HOUR: CPT | Performed by: ANESTHESIOLOGY

## 2024-02-16 PROCEDURE — 80048 BASIC METABOLIC PNL TOTAL CA: CPT | Performed by: NURSE PRACTITIONER

## 2024-02-16 RX ORDER — FUROSEMIDE 10 MG/ML
40 INJECTION INTRAMUSCULAR; INTRAVENOUS ONCE
Status: COMPLETED | OUTPATIENT
Start: 2024-02-16 | End: 2024-02-16

## 2024-02-16 RX ADMIN — MELATONIN 3 MG: at 21:13

## 2024-02-16 RX ADMIN — HYDROMORPHONE HYDROCHLORIDE 0.5 MG: 1 INJECTION, SOLUTION INTRAMUSCULAR; INTRAVENOUS; SUBCUTANEOUS at 16:06

## 2024-02-16 RX ADMIN — MILRINONE LACTATE IN DEXTROSE 0.38 MCG/KG/MIN: 200 INJECTION, SOLUTION INTRAVENOUS at 10:12

## 2024-02-16 RX ADMIN — CHLORHEXIDINE GLUCONATE 15 ML: 1.2 SOLUTION ORAL at 17:56

## 2024-02-16 RX ADMIN — AMIODARONE HYDROCHLORIDE 200 MG: 200 TABLET ORAL at 21:13

## 2024-02-16 RX ADMIN — HYDROMORPHONE HYDROCHLORIDE 0.5 MG: 1 INJECTION, SOLUTION INTRAMUSCULAR; INTRAVENOUS; SUBCUTANEOUS at 06:17

## 2024-02-16 RX ADMIN — OXYCODONE HYDROCHLORIDE 5 MG: 5 TABLET ORAL at 08:46

## 2024-02-16 RX ADMIN — ATORVASTATIN CALCIUM 80 MG: 80 TABLET, FILM COATED ORAL at 17:56

## 2024-02-16 RX ADMIN — HEPARIN SODIUM 5000 UNITS: 5000 INJECTION INTRAVENOUS; SUBCUTANEOUS at 21:14

## 2024-02-16 RX ADMIN — CEFAZOLIN SODIUM 2000 MG: 2 SOLUTION INTRAVENOUS at 13:00

## 2024-02-16 RX ADMIN — OXYCODONE HYDROCHLORIDE 5 MG: 5 TABLET ORAL at 17:56

## 2024-02-16 RX ADMIN — DEXMEDETOMIDINE HYDROCHLORIDE 0.7 MCG/KG/HR: 4 INJECTION, SOLUTION INTRAVENOUS at 00:04

## 2024-02-16 RX ADMIN — HYDROMORPHONE HYDROCHLORIDE 0.5 MG: 1 INJECTION, SOLUTION INTRAMUSCULAR; INTRAVENOUS; SUBCUTANEOUS at 02:21

## 2024-02-16 RX ADMIN — CEFAZOLIN SODIUM 2000 MG: 2 SOLUTION INTRAVENOUS at 04:14

## 2024-02-16 RX ADMIN — ASPIRIN 325 MG ORAL TABLET 325 MG: 325 PILL ORAL at 08:46

## 2024-02-16 RX ADMIN — MUPIROCIN 1 APPLICATION: 20 OINTMENT TOPICAL at 08:46

## 2024-02-16 RX ADMIN — FUROSEMIDE 40 MG: 10 INJECTION, SOLUTION INTRAMUSCULAR; INTRAVENOUS at 15:01

## 2024-02-16 RX ADMIN — ACETAMINOPHEN 650 MG: 325 TABLET, FILM COATED ORAL at 21:00

## 2024-02-16 RX ADMIN — HEPARIN SODIUM 5000 UNITS: 5000 INJECTION INTRAVENOUS; SUBCUTANEOUS at 05:42

## 2024-02-16 RX ADMIN — ACETAMINOPHEN 650 MG: 325 TABLET, FILM COATED ORAL at 08:46

## 2024-02-16 RX ADMIN — HYDROMORPHONE HYDROCHLORIDE 0.5 MG: 1 INJECTION, SOLUTION INTRAMUSCULAR; INTRAVENOUS; SUBCUTANEOUS at 04:32

## 2024-02-16 RX ADMIN — OXYCODONE HYDROCHLORIDE 5 MG: 5 TABLET ORAL at 14:00

## 2024-02-16 RX ADMIN — CHLORHEXIDINE GLUCONATE 15 ML: 1.2 SOLUTION ORAL at 08:46

## 2024-02-16 RX ADMIN — MUPIROCIN 1 APPLICATION: 20 OINTMENT TOPICAL at 21:13

## 2024-02-16 RX ADMIN — HYDROMORPHONE HYDROCHLORIDE 0.5 MG: 1 INJECTION, SOLUTION INTRAMUSCULAR; INTRAVENOUS; SUBCUTANEOUS at 19:58

## 2024-02-16 RX ADMIN — OXYCODONE HYDROCHLORIDE 5 MG: 5 TABLET ORAL at 21:13

## 2024-02-16 RX ADMIN — NICARDIPINE HYDROCHLORIDE 5 MG/HR: 2.5 INJECTION, SOLUTION INTRAVENOUS at 20:00

## 2024-02-16 RX ADMIN — AMIODARONE HYDROCHLORIDE 200 MG: 200 TABLET ORAL at 14:00

## 2024-02-16 RX ADMIN — HEPARIN SODIUM 5000 UNITS: 5000 INJECTION INTRAVENOUS; SUBCUTANEOUS at 14:00

## 2024-02-16 RX ADMIN — HYDROMORPHONE HYDROCHLORIDE 0.5 MG: 1 INJECTION, SOLUTION INTRAMUSCULAR; INTRAVENOUS; SUBCUTANEOUS at 17:43

## 2024-02-16 RX ADMIN — ACETAMINOPHEN 650 MG: 325 TABLET, FILM COATED ORAL at 14:00

## 2024-02-16 NOTE — PLAN OF CARE
Problem: PAIN - ADULT  Goal: Verbalizes/displays adequate comfort level or baseline comfort level  Description: Interventions:  - Encourage patient to monitor pain and request assistance  - Assess pain using appropriate pain scale  - Administer analgesics based on type and severity of pain and evaluate response  - Implement non-pharmacological measures as appropriate and evaluate response  - Consider cultural and social influences on pain and pain management  - Notify physician/advanced practitioner if interventions unsuccessful or patient reports new pain  Outcome: Progressing     Problem: Knowledge Deficit  Goal: Patient/family/caregiver demonstrates understanding of disease process, treatment plan, medications, and discharge instructions  Description: Complete learning assessment and assess knowledge base.  Interventions:  - Provide teaching at level of understanding  - Provide teaching via preferred learning methods  Outcome: Progressing     Problem: CARDIOVASCULAR - ADULT  Goal: Maintains optimal cardiac output and hemodynamic stability  Description: INTERVENTIONS:  - Monitor I/O, vital signs and rhythm  - Monitor for S/S and trends of decreased cardiac output  - Administer and titrate ordered vasoactive medications to optimize hemodynamic stability  - Assess quality of pulses, skin color and temperature  - Assess for signs of decreased coronary artery perfusion  - Instruct patient to report change in severity of symptoms  Outcome: Progressing  Goal: Absence of cardiac dysrhythmias or at baseline rhythm  Description: INTERVENTIONS:  - Continuous cardiac monitoring, vital signs, obtain 12 lead EKG if ordered  - Administer antiarrhythmic and heart rate control medications as ordered  - Monitor electrolytes and administer replacement therapy as ordered  Outcome: Progressing

## 2024-02-16 NOTE — PHYSICAL THERAPY NOTE
Physical Therapy Cancellation Note           02/16/24 0805   Note Type   Note type Cancelled Session   Cancel Reasons Intubated/sedated   Additional Comments IABP in place       Kwesi Germain

## 2024-02-16 NOTE — PROGRESS NOTES
Progress Note - Cardiothoracic Surgery   Rohan Don 63 y.o. male MRN: [id number]  Unit/Bed#: [facility] 416-01 Encounter: [id number]      POD # 1 s/p CABG, MV repair, IABP    Pt seen/examined.  Interval history and data reviewed with critical care team.    Continues on inotropic and IABP support.  Remains intubated.  Following commands.      Medications:   Scheduled Meds:  Current Facility-Administered Medications   Medication Dose Route Frequency Provider Last Rate    acetaminophen  650 mg Rectal Q4H PRN Judi Francisca, PA-C      acetaminophen  650 mg Oral Q6H While awake Judi Francisca, PA-C      amiodarone  200 mg Oral Q8H JENN Judi Francisca, PA-C      aspirin  325 mg Oral Daily Judi Francisca, PA-C      atorvastatin  80 mg Oral Daily With Dinner Judi Francisca, PA-C      bisacodyl  10 mg Rectal Daily PRN Judi Francisca, PA-C      calcium chloride  1 g Intravenous Once PRN Judi Francisca, PA-C      cefazolin  2,000 mg Intravenous Q8H Judi Francisca, PA-C Stopped (02/16/24 0500)    chlorhexidine  15 mL Mouth/Throat BID Judi Francisca, PA-C      dexmedetomidine  0.1-0.7 mcg/kg/hr Intravenous Titrated Rodney Duncan PA-C 0.7 mcg/kg/hr (02/16/24 0004)    epinephrine  1-10 mcg/min Intravenous Titrated Rohan Lynn MD 1 mcg/min (02/16/24 0103)    fentanyl citrate (PF)  50 mcg Intravenous Q1H PRN Judi Francisca, PA-C      heparin (porcine)  5,000 Units Subcutaneous Q8H JENN Judi Francisca, PA-C      HYDROmorphone  0.5 mg Intravenous Q2H PRN Judi Francisca, PA-C      insulin regular (HumuLIN R,NovoLIN R) 1 Units/mL in sodium chloride 0.9 % 100 mL infusion  0.3-21 Units/hr Intravenous Titrated Judi Francisca, PA-C 1 Units/hr (02/16/24 0608)    melatonin  3 mg Oral HS Judi Francisca, PA-C      milrinone (Primacor) infusion  0.38 mcg/kg/min Intravenous Continuous Joselin Pickering PA-C 0.38 mcg/kg/min (02/16/24 0213)    mupirocin  1 Application Nasal Q12H JENN Judi Espinosa PA-C      niCARdipine  2.5-15 mg/hr Intravenous Titrated Judi Espinosa PA-C    "   norepinephrine  1-30 mcg/min Intravenous Titrated Rohan Lynn MD 4 mcg/min (02/16/24 0626)    ondansetron  4 mg Intravenous Q6H PRN Judi Francisca, PA-C      oxyCODONE  2.5 mg Oral Q4H PRN Judi Francisca, PA-C      Or    oxyCODONE  5 mg Oral Q4H PRN Judi Francisca, PA-C      pantoprazole  40 mg Oral Daily Judi Francisca, PA-C      phenylephine   mcg/min Intravenous Titrated Judi Francisca, PA-C      polyethylene glycol  17 g Oral Daily Judi Francisca, PA-C      potassium chloride  20 mEq Intravenous Once PRN Judi Francisca, PA-C      potassium chloride  20 mEq Intravenous Q30 Min PRN Judi Francisca, PA-C      sodium chloride  20 mL/hr Intravenous Continuous Judi Francisca, PA-C 20 mL/hr (02/15/24 1357)     Continuous Infusions:dexmedetomidine, 0.1-0.7 mcg/kg/hr, Last Rate: 0.7 mcg/kg/hr (02/16/24 0004)  epinephrine, 1-10 mcg/min, Last Rate: 1 mcg/min (02/16/24 0103)  insulin regular (HumuLIN R,NovoLIN R) 1 Units/mL in sodium chloride 0.9 % 100 mL infusion, 0.3-21 Units/hr, Last Rate: 1 Units/hr (02/16/24 0608)  milrinone (Primacor) infusion, 0.38 mcg/kg/min, Last Rate: 0.38 mcg/kg/min (02/16/24 0213)  niCARdipine, 2.5-15 mg/hr  norepinephrine, 1-30 mcg/min, Last Rate: 4 mcg/min (02/16/24 0626)  phenylephine,  mcg/min  sodium chloride, 20 mL/hr, Last Rate: 20 mL/hr (02/15/24 1357)      PRN Meds:.  acetaminophen    bisacodyl    calcium chloride    fentanyl citrate (PF)    HYDROmorphone    ondansetron    oxyCODONE **OR** oxyCODONE    potassium chloride    potassium chloride    Vitals: Blood pressure 104/73, pulse 93, temperature (!) 100.8 °F (38.2 °C), resp. rate 16, height 5' 11\" (1.803 m), weight 58.8 kg (129 lb 10.1 oz), SpO2 99%.,Body mass index is 18.08 kg/m².  I/O last 24 hours:  In: 6391.3 [I.V.:4641.3; IV Piggyback:1000]  Out: 3450 [Urine:2355; Blood:750; Chest Tube:345]  Invasive Devices       Central Venous Catheter Line  Duration             CVC Central Lines 02/15/24 <1 day    Introducer 02/15/24 <1 day      "         Peripheral Intravenous Line  Duration             Peripheral IV 02/13/24 Right Antecubital 3 days    Peripheral IV 02/15/24 Left Arm 1 day    Peripheral IV 02/15/24 Left;Ventral (anterior) Hand <1 day              Arterial Line  Duration             Arterial Line 02/15/24 Right Brachial <1 day              Line  Duration             IABP 8.0 Fr. 50 mL <1 day    Pacer Wires <1 day    Pacer Wires <1 day              Drain  Duration             Chest Tube 1 Left Pleural 32 Fr. <1 day    Chest Tube 2 Mediastinal;Posterior 32 Fr. <1 day    Chest Tube 3 Mediastinal;Anterior 32 Fr. <1 day    NG/OG/Enteral Tube Orogastric Center mouth <1 day    Urethral Catheter Non-latex;Straight-tip 16 Fr. <1 day              Airway  Duration             ETT  Hi-Lo;Cuffed;Oral 8.5 mm 1 day                      Lab, Imaging and other studies:   Results from last 7 days   Lab Units 02/16/24  0422 02/15/24  2229 02/15/24  2052 02/15/24  1635 02/15/24  1353 02/15/24  1348 02/15/24  1147 02/15/24  1134 02/15/24  0822 02/15/24  0533 02/14/24  0534   WBC Thousand/uL 14.71*  --   --   --   --   --   --   --   --  8.66 6.19   HEMOGLOBIN g/dL 12.8 13.0 12.9   < >  --  12.2  --   --   --  16.7 16.5   I STAT HEMOGLOBIN g/dl  --   --   --   --  11.9*  --    < >  --    < >  --   --    HEMATOCRIT % 38.9  --  40.2  --   --  39.4  --   --   --  53.1* 51.1*   HEMATOCRIT, ISTAT %  --   --   --   --  35*  --    < >  --    < >  --   --    PLATELETS Thousands/uL 141*  --   --   --   --  151  --  206  --  281 274    < > = values in this interval not displayed.     Results from last 7 days   Lab Units 02/16/24  0422 02/15/24  2052 02/15/24  1808 02/15/24  1353 02/15/24  1348 02/15/24  0822 02/15/24  0533   POTASSIUM mmol/L 4.8  4.9 4.5 5.4*  --  3.6  --  4.3   CHLORIDE mmol/L 109*  111*  --   --   --  113*  --  105   CO2 mmol/L 22  22  --   --   --  20*  --  21   CO2, I-STAT mmol/L  --   --   --  21  --    < >  --    BUN mg/dL 20  20  --   --   --   21  --  25   CREATININE mg/dL 1.10  1.11  --   --   --  0.92  --  0.90   GLUCOSE, ISTAT mg/dl  --   --   --  100  --    < >  --    CALCIUM mg/dL 7.8*  8.1*  --   --   --  8.8  --  9.5    < > = values in this interval not displayed.     Results from last 7 days   Lab Units 02/15/24  1348   INR  1.45*   PTT seconds 30     Recent Labs     02/16/24  0425   PHART 7.457*   GGZ2MSV 22.1   PO2ART 177.5*   OLL1YQZ 32.0*   BEART -0.9           Plan:    Wean vent and extubate.  Gentle wean of inotropic and pressor support.  Continue IABP support.  Cont. De/Cordis/Dyana/Davis.  Continue chest tubes, pacing wires.  Incentive spirometry.  Diuresis.  PO ASA/Statin  No B blocker yet.      SIGNATURE: AGNES Shaw MD  DATE: February 16, 2024  TIME: 7:50 AM

## 2024-02-16 NOTE — PROGRESS NOTES
[facility]  Progress Note: Critical Care   Date of Service: 2/16/2024  Hospital Day: 7  Name: Rohan Don I  MRN: [id number]  Unit/Bed#: [facility] 416-01      Assessment/Plan     Impressions:  MVCAD s/p CABG x 2  Moderate to severe MR s/p MVR  Acute post-op respiratory insuffiency   Acute systolic heart failure  HTN  Prediabetes  Severe protein-calorie malnutrition    Neuro:   VAP Bundle Ordered , Sedation Medications: Precedex 0.4, and RASS Goal: RASS Goal: 0 Alert and Calm  Cardiac Surgery Pain Control: ATC tylenol and PRN oxycodone 2.5/5mg  Delirium precautions  Regulate sleep/wake cycle  CAM-ICU daily  Trend neuro exam      CV:   Cardiac Surgery Hemodynamic Monitoring: MAP goal >65 CI >2.2 SBP < 130 Continue pulmonary artery catheter for hemodynamic monitoring  Continue central line due to vasoactive medications Continue arterial line for blood pressure monitoring and/or frequent blood gas draws  Follow rhythm on telemetry  Critical Care Infusions : Levophed 3 mcg/min, Epinephrine 1 mcg/min, and Primacor 0.38 mcg/kg/min  Beta Blocker Plan: Hold beta blocker secondary to ongoing inotropic support and vasopressors  Epicardial pacing wire plan : Epicardial pacing wires in place. Will pace as needed for bradycardia or cardiac output   Post op cardiac surgery medications:    mg QD  Statin: Lipitor 80 mg qHS  Amiodarone 200 mg PO q8 hours   Maintain IABP 1:1 as inoptropes are weaned     Lung:   Acute respiratory failure requiring mechanical ventilation. Secondary to poor inspiratory effort secondary to pain, pulmonary vascular congestion, and flat postioning due to IABP  Wean supplemental oxygen as tolerated for saturation > 90%  SBT Plan: wean to extubate today  VAP bundle ordered  Chest tube output remains persistently high; Continue chest tubes to suction today    GI:   Continue PPI for stress ulcer prophylaxis  Continue bowel regimen  Trend abdominal exam and bowel  function    FEN:   Diuresis Plan: PRN  Nutrition plan: as tolerated  Replenish electrolytes with goals: K >4.0, Mag >2.0, and Phos >3.0    :   Davis Plan: Continue for accurate I/O monitoring for 48 hours  Trend UOP and BUN/creat  Strict I and O     ID:   Trend temps and WBC count  Maintain normothermia    Heme:   Trend hgb and plts  SQH TID for VTE proph     Endo:       MSK/Skin:  Mobility goal:   PT/OT consult as medically appropriate   Frequent turning and pressure off-loading  Local wound care as needed    Disposition:  Critical care    ICU Core Measures     Vented Patient  VAP Bundle  VAP bundle ordered     A: Assess, Prevent, and Manage Pain Has pain been assessed? Yes  Need for changes to pain regimen? No   B: Both Spontaneous Awakening Trials (SATs) and Spontaneous Breathing Trials (SBTs) Plan to perform spontaneous awakening trial today? Yes   Plan to perform spontaneous breathing trial today? Yes   Obvious barriers to extubation? Yes   C: Choice of Sedation RASS Goal: 0 Alert and Calm  Need for changes to sedation or analgesia regimen? No   D: Delirium CAM-ICU: Negative   E: Early Mobility  Plan for early mobility? No: IABP   F: Family Engagement Plan for family engagement today? Yes       Antibiotic Review: Post op requirements     Review of Invasive Devices:    Davis Plan: Continue for accurate I/O monitoring for 48 hours  Central access plan: Patient has multiple central venous catheters.  Medications requiring central line Hemodynamic monitoring  Dyana Plan: Keep arterial line for hemodynamic monitoring    Prophylaxis:  VTE VTE covered by:  heparin (porcine), Subcutaneous, 5,000 Units at 02/16/24 0542       Stress Ulcer  covered bypantoprazole (PROTONIX) EC tablet 40 mg [[id number]]          Significant 24hr Events      24 Hour Events/HPI: POD # 1 s/p CABG x2, MV repair with ring annuloplasty, IABP placement for existing cardiomyopathy. Remained intubated overnight, but on minimal vent settings and  briskly awakens. Decreased vasopressors (off vasopressin and teagan), but remains on low-dose levo and inotropes.      Subjective   Review of Systems   Unable to perform ROS: Intubated        Objective     Medications:  Scheduled Continuous   acetaminophen, 650 mg, Q6H While awake  amiodarone, 200 mg, Q8H JENN  aspirin, 325 mg, Daily  atorvastatin, 80 mg, Daily With Dinner  cefazolin, 2,000 mg, Q8H  chlorhexidine, 15 mL, BID  heparin (porcine), 5,000 Units, Q8H JENN  melatonin, 3 mg, HS  mupirocin, 1 Application, Q12H JENN  pantoprazole, 40 mg, Daily  polyethylene glycol, 17 g, Daily      dexmedetomidine, 0.1-0.7 mcg/kg/hr, Last Rate: 0.7 mcg/kg/hr (02/16/24 0004)  epinephrine, 1-10 mcg/min, Last Rate: 1 mcg/min (02/16/24 0103)  insulin regular (HumuLIN R,NovoLIN R) 1 Units/mL in sodium chloride 0.9 % 100 mL infusion, 0.3-21 Units/hr, Last Rate: 1 Units/hr (02/16/24 0608)  milrinone (Primacor) infusion, 0.38 mcg/kg/min, Last Rate: 0.38 mcg/kg/min (02/16/24 0213)  niCARdipine, 2.5-15 mg/hr  norepinephrine, 1-30 mcg/min, Last Rate: 4 mcg/min (02/16/24 0626)  phenylephine,  mcg/min  sodium chloride, 20 mL/hr, Last Rate: 20 mL/hr (02/15/24 1357)         Vitals: Invasive Monitoring       Most Recent Min/Max in 24hrs   Temp (!) 100.8 °F (38.2 °C) Temp  Min: 95.6 °F (35.3 °C)  Max: 100.8 °F (38.2 °C)   Pulse 93 Pulse  Min: 89  Max: 93   Resp 16 Resp  Min: 9  Max: 17   /73 No data recorded   O2 Sat 99 % SpO2  Min: 96 %  Max: 99 %   O2 Device: Ventilator  Nasal Cannula O2 Flow Rate (L/min): 2 L/min Arterial Line  Dyana /51  Arterial Line BP  Min: 87/38  Max: 128/49   MAP 73 mmHg  Arterial Line MAP (mmHg)  Min: 60 mmHg  Max: 85 mmHg     PA Catheter   Most Recent  Min/Max in 24hrs    PAP 34/19 PAP  Min: 29/18  Max: 37/23   CVP 14 mmHg CVP (mean)  Min: 10 mmHg  Max: 18 mmHg   CI    No data recorded   SVR 1200 (dyne*sec)/cm5 SVR (dyne*sec)/cm5  Min: 868 (dyne*sec)/cm5  Max: 1271 (dyne*sec)/cm5        I/O Chest tube  output (if present)     Intake/Output Summary (Last 24 hours) at 2/16/2024 0659  Last data filed at 2/16/2024 0600  Gross per 24 hour   Intake 6391.26 ml   Output 3450 ml   Net 2941.26 ml     UOP: /hour    BM: 0 in the last 24 hours  Weight change: 2.6 kg (5 lb 11.7 oz)     Mediastinal tubes:  70 mL/8hr  240 mL/24hr   Pleural tubes: 0 mL/8hr  105 mL/24hr        Physical Exam   Physical Exam  Vitals and nursing note reviewed.   Eyes:      Conjunctiva/sclera: Conjunctivae normal.      Pupils: Pupils are equal, round, and reactive to light.   Skin:     General: Skin is warm and dry.      Capillary Refill: Capillary refill takes less than 2 seconds.      Comments: Feet cool bilaterally    HENT:      Head: Normocephalic and atraumatic.      Mouth/Throat:      Lips: Pink.      Mouth: Mucous membranes are moist.   Neck:      Vascular: Central line present.     Cardiovascular:      Rate and Rhythm: Normal rate and regular rhythm. Occasional Extrasystoles are present.     Pulses:           Radial pulses are 2+ on the right side and 2+ on the left side.        Dorsalis pedis pulses are detected w/ Doppler on the right side and detected w/ Doppler on the left side.      Heart sounds: Normal heart sounds.   Musculoskeletal:      Right lower leg: No edema.      Left lower leg: No edema.   Abdominal: General: Abdomen is flat. Bowel sounds are decreased.      Tenderness: There is no abdominal tenderness.       Constitutional:       General: He is not in acute distress.     Appearance: He is well-developed and well-nourished. He is not ill-appearing.      Interventions: He is sedated, intubated and restrained.   Pulmonary:      Effort: Tachypnea present. He is intubated.      Breath sounds: Normal breath sounds.   Neurological:      General: No focal deficit present.      Mental Status: He is easily aroused. He is lethargic.      GCS: GCS eye subscore is 4. GCS verbal subscore is 1. GCS motor subscore is 6.    Genitourinary/Anorectal:  Davis present.        Diagnostic Studies      02/16/24 CXR: Lungs CTA, lines and tubes in good placement.   This was personally reviewed by myself in PACS.  02/16/24 EKG: NSR.   This was personally reviewed by myself.         Labs:  CBC    Recent Labs     02/15/24  0533 02/15/24  0822 02/15/24  1348 02/15/24  1353 02/15/24  2052 02/15/24  2229 02/16/24  0422   WBC 8.66  --   --   --   --   --  14.71*   HGB 16.7   < > 12.2   < > 12.9 13.0 12.8   HCT 53.1*   < > 39.4   < > 40.2  --  38.9      < > 151  --   --   --  141*    < > = values in this interval not displayed.     BMP    Recent Labs     02/15/24  1348 02/15/24  1353 02/15/24  1808 02/15/24  2052 02/16/24  0422   SODIUM 142  --   --   --  139  140   K 3.6  --    < > 4.5 4.8  4.9   *  --   --   --  109*  111*   CO2 20* 21  --   --  22  22   AGAP 9  --   --   --  8  7   BUN 21  --   --   --  20  20   CREATININE 0.92  --   --   --  1.10  1.11   CALCIUM 8.8  --   --   --  7.8*  8.1*    < > = values in this interval not displayed.        Baseline Creat: 1.0   Coags    Recent Labs     02/15/24  1348   INR 1.45*   PTT 30              Additional Electrolytes  Recent Labs     02/15/24  1246 02/15/24  1353 02/16/24  0422   MG  --   --  2.5   CAIONIZED 1.50* 1.28  --           Blood Gas    Recent Labs     02/16/24 0425   PHART 7.457*   [id number] 32.0*   PO2ART 177.5*   ZEY6ZNM 22.1   BEART -0.9   SOURCE Line, Arterial     Recent Labs     02/16/24 0422 02/16/24 0425   PHVEN 7.393  --    SBR5EQJ 40.0*  --    PO2VEN 39.2  --    OSM9EDH 23.8*  --    BEVEN -0.9  --    P4YSZBT 73.5  --    SOURCE  --  Line, Arterial    LFTs  Recent Labs     02/15/24  0533 02/16/24  0422   ALT 81* 48   AST 47* 57*   ALKPHOS 62 36   ALB 3.8 3.1*   TBILI 0.86 1.07*       Infectious    No recent results     Recent Labs     02/14/24  1012   MRSACULTURE No Methicillin Resistant Staphlyococcus aureus (MRSA) isolated    Glucose  Recent Labs      02/15/24  0533 02/15/24  1348 02/16/24  0422   GLUC 114 101 152*  154*            Collaborative bedside rounds performed with cardiac surgery attending, critical care attending and bedside RN.     OSCAR Renteria

## 2024-02-16 NOTE — OCCUPATIONAL THERAPY NOTE
Occupational Therapy Cancel Note        Patient Name: Rohan Don  Today's Date: 2/16/2024 02/16/24 1003   OT Last Visit   OT Visit Date 02/16/24   Note Type   Note type Re-Evaluation;Cancelled Session   Cancel Reasons Intubated/sedated       New OT orders received and chart was reviewed. Pt currently intubated/sedated and is on IABP support, not appropriate to participate in skilled OT services. Will continue to follow on caseload and see when appropriate.     Deb León MS, OTR/L

## 2024-02-16 NOTE — PROGRESS NOTES
"  Cardiology Team 2 Progress Note   Rohan Don 63 y.o. male MRN: [id number]  Unit/Bed#: [facility] 416-01 Encounter: [id number]            Assessment:  Rohan Don is a 63 y.o. year old male who presented on 2 9 with shortness of breath and worsening leg swelling for 2 weeks.  Patient was found to have HFrEF, eventually underwent ischemic workup showing multivessel disease s/p CABG x 2 and MVR(12/15/2024).  Today is POD #1.    ADHF w/ Reduced EF. NYHA Class III/ACC Stage C. Ischemic cardiomyopathy  Current pressor requirements:  Levophed: 4 mcg  Milrinone: 0.38  Epi and vaso turned off this morning  Mechanical support  Currently on balloon pump at 1:1    Hemodynamics:   MAP: 67  CVP:9  PA: 31/16  CO/CI: 3.3/1.9  CO/CI: 5.1/3 ([place])  SVR: 910    MvCAD s/p CABG x2 and MV Annuloplasty ( SVG to RCA and LIMA to LAD).   Prediabetes. A1c 6.1  Hypertension      Plan:  Continue weaning pressors and inotropes as tolerated  If able to wean intra-aortic balloon pump to 1-2, will need to be on heparin   Patient examines euvolemic, okay to hold off on diuresis for now  Hold off on beta-blockers for now      Subjective:     No acute overnight events.  Patient does not have any complaints at this time.        Vitals: /73   Pulse 93   Temp (!) 100.8 °F (38.2 °C)   Resp 16   Ht 5' 11\" (1.803 m)   Wt 58.8 kg (129 lb 10.1 oz)   SpO2 99%   BMI 18.08 kg/m²       Intake/Output Summary (Last 24 hours) at 2/16/2024 0906  Last data filed at 2/16/2024 0600  Gross per 24 hour   Intake 6391.26 ml   Output 3300 ml   Net 3091.26 ml       24 Hours VS:   Temp:  [95.6 °F (35.3 °C)-100.8 °F (38.2 °C)] 100.8 °F (38.2 °C)  HR:  [89-93] 93  Resp:  [9-17] 16  SpO2:  [96 %-99 %] 99 %     Review of System:  Review of system was conducted and was negative except for as stated in the subjective.    Physical Exam:  Physical Exam  Constitutional:       General: He is not in acute distress.     Appearance: He is not ill-appearing. "   Cardiovascular:      Rate and Rhythm: Normal rate and regular rhythm.   Pulmonary:      Breath sounds: Rales present.   Musculoskeletal:      Right lower leg: No edema.      Left lower leg: No edema.   Skin:     General: Skin is warm.   Neurological:      General: No focal deficit present.      Mental Status: He is alert.          Inpatient medications:   Scheduled Meds:  Current Facility-Administered Medications   Medication Dose Route Frequency Provider Last Rate    acetaminophen  650 mg Rectal Q4H PRN Judi Francisca, PA-C      acetaminophen  650 mg Oral Q6H While awake Judi Francisca, PA-C      amiodarone  200 mg Oral Q8H JENN Judi Francisca, PA-C      aspirin  325 mg Oral Daily Judi Francisca, PA-C      atorvastatin  80 mg Oral Daily With Dinner Judi Francisca, PA-C      bisacodyl  10 mg Rectal Daily PRN Judi Francisca, PA-C      cefazolin  2,000 mg Intravenous Q8H Judi Francisca, PA-C Stopped (02/16/24 0500)    chlorhexidine  15 mL Mouth/Throat BID Judi Francisca, PA-C      dexmedetomidine  0.1-0.7 mcg/kg/hr Intravenous Titrated Rodney Duncan PA-C Stopped (02/16/24 0810)    epinephrine  1-10 mcg/min Intravenous Titrated Rohan Lynn MD Stopped (02/16/24 0800)    fentanyl citrate (PF)  50 mcg Intravenous Q1H PRN Judi Francisca, PA-C      heparin (porcine)  5,000 Units Subcutaneous Q8H JENN Judi Francisca, PA-C      HYDROmorphone  0.5 mg Intravenous Q2H PRN Judi Francisca, PA-C      insulin regular (HumuLIN R,NovoLIN R) 1 Units/mL in sodium chloride 0.9 % 100 mL infusion  0.3-21 Units/hr Intravenous Titrated Judi Francisca, PA-C 1 Units/hr (02/16/24 0608)    melatonin  3 mg Oral HS Judi Francisca, PA-C      milrinone (Primacor) infusion  0.38 mcg/kg/min Intravenous Continuous Joselin Pickering PA-C 0.38 mcg/kg/min (02/16/24 0213)    mupirocin  1 Application Nasal Q12H JENN Judi Francisca, PA-C      niCARdipine  2.5-15 mg/hr Intravenous Titrated Judi Francisca, PA-C      norepinephrine  1-30 mcg/min Intravenous Titrated Rohan Lynn MD 4  mcg/min (02/16/24 0626)    ondansetron  4 mg Intravenous Q6H PRN Judi Francisca, PA-C      oxyCODONE  2.5 mg Oral Q4H PRN Judi Francisca, PA-C      Or    oxyCODONE  5 mg Oral Q4H PRN Judi Francisca, PA-C      pantoprazole  40 mg Oral Daily Judi Francisca, PA-C      phenylephine   mcg/min Intravenous Titrated Judi Francisca, PA-C      polyethylene glycol  17 g Oral Daily Judi Francisca, PA-C      sodium chloride  20 mL/hr Intravenous Continuous Judi Francisca, PA-C 20 mL/hr (02/15/24 1357)     Continuous Infusions:dexmedetomidine, 0.1-0.7 mcg/kg/hr, Last Rate: Stopped (02/16/24 0810)  epinephrine, 1-10 mcg/min, Last Rate: Stopped (02/16/24 0800)  insulin regular (HumuLIN R,NovoLIN R) 1 Units/mL in sodium chloride 0.9 % 100 mL infusion, 0.3-21 Units/hr, Last Rate: 1 Units/hr (02/16/24 0608)  milrinone (Primacor) infusion, 0.38 mcg/kg/min, Last Rate: 0.38 mcg/kg/min (02/16/24 0213)  niCARdipine, 2.5-15 mg/hr  norepinephrine, 1-30 mcg/min, Last Rate: 4 mcg/min (02/16/24 0626)  phenylephine,  mcg/min  sodium chloride, 20 mL/hr, Last Rate: 20 mL/hr (02/15/24 1357)      PRN Meds:.  acetaminophen    bisacodyl    fentanyl citrate (PF)    HYDROmorphone    ondansetron    oxyCODONE **OR** oxyCODONE     Labs & Results:  Results from last 7 days   Lab Units 02/16/24  0422 02/15/24  2052 02/15/24  1808 02/15/24  1353 02/15/24  1348 02/15/24  0822 02/15/24  0533   POTASSIUM mmol/L 4.8  4.9 4.5 5.4*  --  3.6  --  4.3   CO2 mmol/L 22  22  --   --   --  20*  --  21   CO2, I-STAT mmol/L  --   --   --  21  --    < >  --    CHLORIDE mmol/L 109*  111*  --   --   --  113*  --  105   BUN mg/dL 20  20  --   --   --  21  --  25   CREATININE mg/dL 1.10  1.11  --   --   --  0.92  --  0.90    < > = values in this interval not displayed.     Results from last 7 days   Lab Units 02/16/24  0422 02/15/24  2229 02/15/24  2052 02/15/24  1635 02/15/24  1353 02/15/24  1348 02/15/24  1147 02/15/24  1134   HEMOGLOBIN g/dL 12.8 13.0 12.9   < >  --  12.2   --   --    I STAT HEMOGLOBIN g/dl  --   --   --   --  11.9*  --    < >  --    HEMATOCRIT % 38.9  --  40.2  --   --  39.4  --   --    HEMATOCRIT, ISTAT %  --   --   --   --  35*  --    < >  --    PLATELETS Thousands/uL 141*  --   --   --   --  151  --  206    < > = values in this interval not displayed.     Results from last 7 days   Lab Units 02/09/24  1613   TRIGLYCERIDES mg/dL 96   HDL mg/dL 23*   LDL CALC mg/dL 66   HEMOGLOBIN A1C % 6.1*         VTE Prophylaxis: Heparin            Rafael Vallecillo MD  Cardiology Fellow   FY-1      [employer]/Care Everywhere records reviewed: y    ** Please Note: Fluency DirectDictation voice to text software may have been used in the creation of this document. **

## 2024-02-17 LAB
ABO GROUP BLD: NORMAL
ALBUMIN SERPL BCP-MCNC: 3.3 G/DL (ref 3.5–5)
ALP SERPL-CCNC: 41 U/L (ref 34–104)
ALT SERPL W P-5'-P-CCNC: 28 U/L (ref 7–52)
ANION GAP SERPL CALCULATED.3IONS-SCNC: 6 MMOL/L
ANION GAP SERPL CALCULATED.3IONS-SCNC: 7 MMOL/L
AST SERPL W P-5'-P-CCNC: 46 U/L (ref 13–39)
BASE EX.OXY STD BLDV CALC-SCNC: 73.9 % (ref 60–80)
BASE EXCESS BLDV CALC-SCNC: -1.5 MMOL/L
BASOPHILS # BLD AUTO: 0.03 THOUSANDS/ÂΜL (ref 0–0.1)
BASOPHILS NFR BLD AUTO: 0 % (ref 0–1)
BILIRUB DIRECT SERPL-MCNC: 0.19 MG/DL (ref 0–0.2)
BILIRUB SERPL-MCNC: 0.67 MG/DL (ref 0.2–1)
BLD GP AB SCN SERPL QL: NEGATIVE
BUN SERPL-MCNC: 24 MG/DL (ref 5–25)
BUN SERPL-MCNC: 29 MG/DL (ref 5–25)
CA-I BLD-SCNC: 1.09 MMOL/L (ref 1.12–1.32)
CALCIUM SERPL-MCNC: 8.5 MG/DL (ref 8.4–10.2)
CALCIUM SERPL-MCNC: 8.8 MG/DL (ref 8.4–10.2)
CHLORIDE SERPL-SCNC: 104 MMOL/L (ref 96–108)
CHLORIDE SERPL-SCNC: 96 MMOL/L (ref 96–108)
CO2 SERPL-SCNC: 26 MMOL/L (ref 21–32)
CO2 SERPL-SCNC: 31 MMOL/L (ref 21–32)
CREAT SERPL-MCNC: 0.89 MG/DL (ref 0.6–1.3)
CREAT SERPL-MCNC: 1.19 MG/DL (ref 0.6–1.3)
EOSINOPHIL # BLD AUTO: 0 THOUSAND/ÂΜL (ref 0–0.61)
EOSINOPHIL NFR BLD AUTO: 0 % (ref 0–6)
ERYTHROCYTE [DISTWIDTH] IN BLOOD BY AUTOMATED COUNT: 13.5 % (ref 11.6–15.1)
GFR SERPL CREATININE-BSD FRML MDRD: 65 ML/MIN/1.73SQ M
GFR SERPL CREATININE-BSD FRML MDRD: 91 ML/MIN/1.73SQ M
GLUCOSE SERPL-MCNC: 103 MG/DL (ref 65–140)
GLUCOSE SERPL-MCNC: 106 MG/DL (ref 65–140)
GLUCOSE SERPL-MCNC: 109 MG/DL (ref 65–140)
GLUCOSE SERPL-MCNC: 110 MG/DL (ref 65–140)
GLUCOSE SERPL-MCNC: 114 MG/DL (ref 65–140)
GLUCOSE SERPL-MCNC: 116 MG/DL (ref 65–140)
GLUCOSE SERPL-MCNC: 121 MG/DL (ref 65–140)
GLUCOSE SERPL-MCNC: 127 MG/DL (ref 65–140)
GLUCOSE SERPL-MCNC: 131 MG/DL (ref 65–140)
GLUCOSE SERPL-MCNC: 146 MG/DL (ref 65–140)
HCO3 BLDV-SCNC: 25 MMOL/L (ref 24–30)
HCT VFR BLD AUTO: 39.2 % (ref 36.5–49.3)
HGB BLD-MCNC: 12.1 G/DL (ref 12–17)
IMM GRANULOCYTES # BLD AUTO: 0.11 THOUSAND/UL (ref 0–0.2)
IMM GRANULOCYTES NFR BLD AUTO: 1 % (ref 0–2)
LYMPHOCYTES # BLD AUTO: 0.91 THOUSANDS/ÂΜL (ref 0.6–4.47)
LYMPHOCYTES NFR BLD AUTO: 5 % (ref 14–44)
MAGNESIUM SERPL-MCNC: 2.4 MG/DL (ref 1.9–2.7)
MCH RBC QN AUTO: 29.4 PG (ref 26.8–34.3)
MCHC RBC AUTO-ENTMCNC: 30.9 G/DL (ref 31.4–37.4)
MCV RBC AUTO: 95 FL (ref 82–98)
MONOCYTES # BLD AUTO: 2.43 THOUSAND/ÂΜL (ref 0.17–1.22)
MONOCYTES NFR BLD AUTO: 12 % (ref 4–12)
NEUTROPHILS # BLD AUTO: 16.12 THOUSANDS/ÂΜL (ref 1.85–7.62)
NEUTS SEG NFR BLD AUTO: 82 % (ref 43–75)
NRBC BLD AUTO-RTO: 0 /100 WBCS
O2 CT BLDV-SCNC: 13.6 ML/DL
PCO2 BLDV: 49.3 MM HG (ref 42–50)
PH BLDV: 7.32 [PH] (ref 7.3–7.4)
PHOSPHATE SERPL-MCNC: 3.9 MG/DL (ref 2.3–4.1)
PLATELET # BLD AUTO: 61 THOUSANDS/UL (ref 149–390)
PO2 BLDV: 40.2 MM HG (ref 35–45)
POTASSIUM SERPL-SCNC: 4 MMOL/L (ref 3.5–5.3)
POTASSIUM SERPL-SCNC: 4 MMOL/L (ref 3.5–5.3)
PROT SERPL-MCNC: 5.9 G/DL (ref 6.4–8.4)
RBC # BLD AUTO: 4.11 MILLION/UL (ref 3.88–5.62)
RH BLD: NEGATIVE
SODIUM SERPL-SCNC: 134 MMOL/L (ref 135–147)
SODIUM SERPL-SCNC: 136 MMOL/L (ref 135–147)
SPECIMEN EXPIRATION DATE: NORMAL
WBC # BLD AUTO: 19.6 THOUSAND/UL (ref 4.31–10.16)

## 2024-02-17 PROCEDURE — 82948 REAGENT STRIP/BLOOD GLUCOSE: CPT

## 2024-02-17 PROCEDURE — 99233 SBSQ HOSP IP/OBS HIGH 50: CPT | Performed by: ANESTHESIOLOGY

## 2024-02-17 PROCEDURE — 84100 ASSAY OF PHOSPHORUS: CPT | Performed by: NURSE PRACTITIONER

## 2024-02-17 PROCEDURE — 86901 BLOOD TYPING SEROLOGIC RH(D): CPT | Performed by: NURSE PRACTITIONER

## 2024-02-17 PROCEDURE — 86900 BLOOD TYPING SEROLOGIC ABO: CPT | Performed by: NURSE PRACTITIONER

## 2024-02-17 PROCEDURE — 99232 SBSQ HOSP IP/OBS MODERATE 35: CPT | Performed by: STUDENT IN AN ORGANIZED HEALTH CARE EDUCATION/TRAINING PROGRAM

## 2024-02-17 PROCEDURE — 80076 HEPATIC FUNCTION PANEL: CPT | Performed by: NURSE PRACTITIONER

## 2024-02-17 PROCEDURE — 94664 DEMO&/EVAL PT USE INHALER: CPT

## 2024-02-17 PROCEDURE — 85025 COMPLETE CBC W/AUTO DIFF WBC: CPT | Performed by: NURSE PRACTITIONER

## 2024-02-17 PROCEDURE — 82330 ASSAY OF CALCIUM: CPT | Performed by: NURSE PRACTITIONER

## 2024-02-17 PROCEDURE — 80048 BASIC METABOLIC PNL TOTAL CA: CPT | Performed by: NURSE PRACTITIONER

## 2024-02-17 PROCEDURE — 86850 RBC ANTIBODY SCREEN: CPT | Performed by: NURSE PRACTITIONER

## 2024-02-17 PROCEDURE — 99024 POSTOP FOLLOW-UP VISIT: CPT | Performed by: THORACIC SURGERY (CARDIOTHORACIC VASCULAR SURGERY)

## 2024-02-17 PROCEDURE — 82805 BLOOD GASES W/O2 SATURATION: CPT | Performed by: NURSE PRACTITIONER

## 2024-02-17 PROCEDURE — NC001 PR NO CHARGE: Performed by: NURSE PRACTITIONER

## 2024-02-17 PROCEDURE — 83735 ASSAY OF MAGNESIUM: CPT | Performed by: NURSE PRACTITIONER

## 2024-02-17 PROCEDURE — 30233R1 TRANSFUSION OF NONAUTOLOGOUS PLATELETS INTO PERIPHERAL VEIN, PERCUTANEOUS APPROACH: ICD-10-PCS | Performed by: ANESTHESIOLOGY

## 2024-02-17 PROCEDURE — P9037 PLATE PHERES LEUKOREDU IRRAD: HCPCS

## 2024-02-17 RX ORDER — TEMAZEPAM 15 MG/1
15 CAPSULE ORAL
Status: DISCONTINUED | OUTPATIENT
Start: 2024-02-17 | End: 2024-02-21 | Stop reason: HOSPADM

## 2024-02-17 RX ORDER — FUROSEMIDE 10 MG/ML
40 INJECTION INTRAMUSCULAR; INTRAVENOUS
Status: DISCONTINUED | OUTPATIENT
Start: 2024-02-17 | End: 2024-02-18

## 2024-02-17 RX ORDER — MAGNESIUM SULFATE HEPTAHYDRATE 40 MG/ML
2 INJECTION, SOLUTION INTRAVENOUS ONCE
Status: COMPLETED | OUTPATIENT
Start: 2024-02-17 | End: 2024-02-17

## 2024-02-17 RX ORDER — METHOCARBAMOL 500 MG/1
500 TABLET, FILM COATED ORAL EVERY 6 HOURS SCHEDULED
Status: DISCONTINUED | OUTPATIENT
Start: 2024-02-17 | End: 2024-02-20

## 2024-02-17 RX ORDER — DOCUSATE SODIUM 100 MG/1
100 CAPSULE, LIQUID FILLED ORAL 2 TIMES DAILY
Status: DISCONTINUED | OUTPATIENT
Start: 2024-02-18 | End: 2024-02-21 | Stop reason: HOSPADM

## 2024-02-17 RX ORDER — INSULIN LISPRO 100 [IU]/ML
1-5 INJECTION, SOLUTION INTRAVENOUS; SUBCUTANEOUS
Status: DISCONTINUED | OUTPATIENT
Start: 2024-02-17 | End: 2024-02-21 | Stop reason: HOSPADM

## 2024-02-17 RX ORDER — FUROSEMIDE 10 MG/ML
40 INJECTION INTRAMUSCULAR; INTRAVENOUS ONCE
Status: COMPLETED | OUTPATIENT
Start: 2024-02-17 | End: 2024-02-17

## 2024-02-17 RX ORDER — POTASSIUM CHLORIDE 20 MEQ/1
20 TABLET, EXTENDED RELEASE ORAL 2 TIMES DAILY
Status: DISCONTINUED | OUTPATIENT
Start: 2024-02-17 | End: 2024-02-18

## 2024-02-17 RX ORDER — POTASSIUM CHLORIDE 14.9 MG/ML
20 INJECTION INTRAVENOUS ONCE
Status: COMPLETED | OUTPATIENT
Start: 2024-02-17 | End: 2024-02-17

## 2024-02-17 RX ORDER — CALCIUM GLUCONATE 20 MG/ML
2 INJECTION, SOLUTION INTRAVENOUS ONCE
Status: COMPLETED | OUTPATIENT
Start: 2024-02-17 | End: 2024-02-17

## 2024-02-17 RX ADMIN — POTASSIUM CHLORIDE 20 MEQ: 1500 TABLET, EXTENDED RELEASE ORAL at 11:02

## 2024-02-17 RX ADMIN — ACETAMINOPHEN 650 MG: 325 TABLET, FILM COATED ORAL at 20:24

## 2024-02-17 RX ADMIN — CHLORHEXIDINE GLUCONATE 15 ML: 1.2 SOLUTION ORAL at 08:27

## 2024-02-17 RX ADMIN — METHOCARBAMOL 500 MG: 500 TABLET ORAL at 13:56

## 2024-02-17 RX ADMIN — MELATONIN 3 MG: at 21:26

## 2024-02-17 RX ADMIN — POTASSIUM CHLORIDE 20 MEQ: 1500 TABLET, EXTENDED RELEASE ORAL at 17:26

## 2024-02-17 RX ADMIN — FUROSEMIDE 40 MG: 10 INJECTION, SOLUTION INTRAMUSCULAR; INTRAVENOUS at 16:09

## 2024-02-17 RX ADMIN — MAGNESIUM SULFATE HEPTAHYDRATE 2 G: 40 INJECTION, SOLUTION INTRAVENOUS at 16:14

## 2024-02-17 RX ADMIN — AMIODARONE HYDROCHLORIDE 200 MG: 200 TABLET ORAL at 06:06

## 2024-02-17 RX ADMIN — HEPARIN SODIUM 5000 UNITS: 5000 INJECTION INTRAVENOUS; SUBCUTANEOUS at 06:06

## 2024-02-17 RX ADMIN — HEPARIN SODIUM 5000 UNITS: 5000 INJECTION INTRAVENOUS; SUBCUTANEOUS at 13:56

## 2024-02-17 RX ADMIN — NICARDIPINE HYDROCHLORIDE 2.5 MG/HR: 2.5 INJECTION, SOLUTION INTRAVENOUS at 04:41

## 2024-02-17 RX ADMIN — METHOCARBAMOL 500 MG: 500 TABLET ORAL at 17:26

## 2024-02-17 RX ADMIN — MUPIROCIN 1 APPLICATION: 20 OINTMENT TOPICAL at 20:25

## 2024-02-17 RX ADMIN — HYDROMORPHONE HYDROCHLORIDE 0.5 MG: 1 INJECTION, SOLUTION INTRAMUSCULAR; INTRAVENOUS; SUBCUTANEOUS at 02:43

## 2024-02-17 RX ADMIN — OXYCODONE HYDROCHLORIDE 5 MG: 5 TABLET ORAL at 06:06

## 2024-02-17 RX ADMIN — CALCIUM GLUCONATE 2 G: 20 INJECTION, SOLUTION INTRAVENOUS at 06:29

## 2024-02-17 RX ADMIN — Medication 12.5 MG: at 21:26

## 2024-02-17 RX ADMIN — MUPIROCIN 1 APPLICATION: 20 OINTMENT TOPICAL at 08:27

## 2024-02-17 RX ADMIN — AMIODARONE HYDROCHLORIDE 200 MG: 200 TABLET ORAL at 21:26

## 2024-02-17 RX ADMIN — AMIODARONE HYDROCHLORIDE 200 MG: 200 TABLET ORAL at 13:56

## 2024-02-17 RX ADMIN — POLYETHYLENE GLYCOL 3350 17 G: 17 POWDER, FOR SOLUTION ORAL at 08:27

## 2024-02-17 RX ADMIN — FUROSEMIDE 40 MG: 10 INJECTION, SOLUTION INTRAMUSCULAR; INTRAVENOUS at 02:42

## 2024-02-17 RX ADMIN — ATORVASTATIN CALCIUM 80 MG: 80 TABLET, FILM COATED ORAL at 16:09

## 2024-02-17 RX ADMIN — CHLORHEXIDINE GLUCONATE 15 ML: 1.2 SOLUTION ORAL at 17:26

## 2024-02-17 RX ADMIN — METHOCARBAMOL 500 MG: 500 TABLET ORAL at 23:16

## 2024-02-17 RX ADMIN — HYDROMORPHONE HYDROCHLORIDE 0.5 MG: 1 INJECTION, SOLUTION INTRAMUSCULAR; INTRAVENOUS; SUBCUTANEOUS at 08:27

## 2024-02-17 RX ADMIN — DEXTROSE 150 MG: 50 INJECTION, SOLUTION INTRAVENOUS at 16:36

## 2024-02-17 RX ADMIN — ONDANSETRON 4 MG: 2 INJECTION INTRAMUSCULAR; INTRAVENOUS at 13:18

## 2024-02-17 RX ADMIN — METHOCARBAMOL 500 MG: 500 TABLET ORAL at 08:27

## 2024-02-17 RX ADMIN — POTASSIUM CHLORIDE 20 MEQ: 14.9 INJECTION, SOLUTION INTRAVENOUS at 16:38

## 2024-02-17 RX ADMIN — ASPIRIN 325 MG ORAL TABLET 325 MG: 325 PILL ORAL at 08:27

## 2024-02-17 RX ADMIN — Medication 12.5 MG: at 13:56

## 2024-02-17 RX ADMIN — MILRINONE LACTATE IN DEXTROSE 0.25 MCG/KG/MIN: 200 INJECTION, SOLUTION INTRAVENOUS at 08:45

## 2024-02-17 RX ADMIN — FUROSEMIDE 40 MG: 10 INJECTION, SOLUTION INTRAMUSCULAR; INTRAVENOUS at 11:02

## 2024-02-17 RX ADMIN — PANTOPRAZOLE SODIUM 40 MG: 40 TABLET, DELAYED RELEASE ORAL at 08:27

## 2024-02-17 RX ADMIN — ACETAMINOPHEN 650 MG: 325 TABLET, FILM COATED ORAL at 08:27

## 2024-02-17 RX ADMIN — OXYCODONE HYDROCHLORIDE 5 MG: 5 TABLET ORAL at 20:25

## 2024-02-17 RX ADMIN — OXYCODONE HYDROCHLORIDE 5 MG: 5 TABLET ORAL at 13:56

## 2024-02-17 RX ADMIN — SODIUM CHLORIDE 0.5 UNITS/HR: 9 INJECTION, SOLUTION INTRAVENOUS at 04:41

## 2024-02-17 RX ADMIN — ACETAMINOPHEN 650 MG: 325 TABLET, FILM COATED ORAL at 13:56

## 2024-02-17 RX ADMIN — OXYCODONE HYDROCHLORIDE 5 MG: 5 TABLET ORAL at 02:14

## 2024-02-17 RX ADMIN — HEPARIN SODIUM 5000 UNITS: 5000 INJECTION INTRAVENOUS; SUBCUTANEOUS at 21:27

## 2024-02-17 NOTE — OCCUPATIONAL THERAPY NOTE
Occupational Therapy Re Evaluation     Patient Name: Rohan Don  Today's Date: 2/17/2024  Problem List  Principal Problem:    Coronary artery disease  Active Problems:    Acute systolic congestive heart failure (HCC)    Prediabetes    DEISI (acute kidney injury) (HCC)    Elevated transaminase level    Vitamin D deficiency    HTN (hypertension)    Pleural effusion    Severe protein-calorie malnutrition (HCC)    Nodule of upper lobe of right lung    Renal calculi    S/P CABG x 2    S/P MVR (mitral valve repair)    Ischemic cardiomyopathy    Severe left ventricular systolic dysfunction    Past Medical History  History reviewed. No pertinent past medical history.  Past Surgical History  Past Surgical History:   Procedure Laterality Date    CARDIAC CATHETERIZATION N/A 2/12/2024    Procedure: Cardiac catheterization;  Surgeon: Shanon Pal DO;  Location: [facility];  Service: Cardiology    CARDIAC CATHETERIZATION Left 2/12/2024    Procedure: Cardiac Left Heart Cath;  Surgeon: Shanon Pal DO;  Location: [facility];  Service: Cardiology    CARDIAC CATHETERIZATION N/A 2/12/2024    Procedure: Cardiac Coronary Angiogram;  Surgeon: Shanon Pal DO;  Location: [facility];  Service: Cardiology    SC CORONARY ARTERY BYP W/VEIN & ARTERY GRAFT 2 VEIN N/A 2/15/2024    Procedure: CORONARY ARTERY BYPASS GRAFT (CABG) X 2 VESSELS, SVG --> RCA, LIMA to LAD. MITRAL REPAIR WITH  32mm PHYSIO II MITRAL RING;  Surgeon: AGNES Shaw MD;  Location: [facility];  Service: Cardiac Surgery    SC INSERTION INTRA-AORTIC BALLOON ASSIST DEV PERQ Right 2/15/2024    Procedure: INSERTION INTRA BALLOON PUMP AORTIC (IABP);  Surgeon: AGNES Shaw MD;  Location: [facility];  Service: Cardiac Surgery         02/17/24 1100   OT Last Visit   OT Visit Date 02/17/24   Note Type   Note type Cancelled Session;Re-Evaluation   Cancel Reasons Medical status   Additional Comments Pt with IABP in  place. OT will continue to follow     CHENTE Arnold, OTR/L

## 2024-02-17 NOTE — PROCEDURES
Procedure: Intra Aortic Balloon Pump removal     Rohan Don was informed of mandatory six hour post-procedure bed rest. 1u platelets were transfused immediately prior and during removal. IABP was removed from right groin in routine fashion with initial back bleed.  Manual pressure was held for 30 minutes without incident.  Distal NV status was intact post procedure.  Vital signs stable. The patient tolerated the procedure well. Vital signs q 15 minutes for one hour and distal NV checks q hour for 6 hours as per protocol.The assigned nurse was notified. Site dressed with a pressure dressing.     SIGNATURE: OSCAR Renteria  DATE: February 17, 2024  TIME: 12:39 PM

## 2024-02-17 NOTE — PROGRESS NOTES
[facility]  Progress Note: Critical Care   Date of Service: 2/17/2024  Hospital Day: 8  Name: Rohan Don I  MRN: [id number]  Unit/Bed#: [facility] 416-01      Assessment/Plan     Impressions:  MVCAD s/p CABG x 2 (2/15/24)   Moderate to severe MR s/p MVR  Acute systolic heart failure (EF 25%)  HTN  Mild DEISI  Prediabetes  Severe protein-calorie malnutrition    Neuro:   Analgesia:     Tylenol 650mg q6h  Oxycodone 2.5 - 5mg q4h PRN  Robaxin 500mg q6h   Discontinue IV dilaudid   Delirium precautions  Regulate sleep/wake cycle  Restoril 15mg qHS PRN  CAM-ICU daily  Routine neuro checks       CV:   Cardiac Surgery Hemodynamic Monitoring Goals:  MAP > 65   SBP < 130  CI > 2.2  Continue to monitor and trend cardiac output and filling pressures   Continue pulmonary artery catheter for hemodynamic monitoring   Continue central line due to vasoactive medications  Continue arterial line for blood pressure monitoring and/or frequent ABG's  Current vasopressors/inotropes:    Milrinone 0.25 mcg/kg/min  Nicardipine 2.5 mg/hr  Discontinue IABP today and wean milrinone as tolerated   Beta Blocker Plan:    Start metoprolol tartrate 12.5 mg BID once IABP is removed and milrinone is weaned   Epicardial Pacing Wire Plan:    Epicardial pacing wire plan : No longer needed, discontinue after first dose of beta blocker   Post op cardiac surgery medications:     mg daily  Atorvastatin 80 mg qHS  Amiodarone 200 mg PO q8 hours   Continue close telemetry monitoring     Lung:   Continue to monitor oxygenation on room air    Chest tube output remains persistently high; Continue chest tubes to suction today given patient has been unable to mobilize   Continue incentive spirometry and encourage coughing and deep breathing   CXR as needed   ABG as needed       GI:   Continue PPI for stress ulcer prophylaxis  Bowel regimen:  Colace BID  Miralax daily   Dulcolax daily PRN  Monitor abdominal exam and bowel  function    FEN:   Diuresis Plan: Lasix 40mg IV BID  Nutrition plan: Cardiac diet, 1800mL fluid restriction   Replenish electrolytes with goals: K >4.0, Mag >2.0, and Phos >3.0    :   Strict I/O monitoring   Maintain Davis catheter until after bedrest   Continue to trend regnal function tests      ID:   Monitor WBC and fever curve off antibiotics     Heme:   CBC daily to monitor hemoglobin and platelets   VTE prophylaxis: SCD's as able based on vein harvest sites, SQH TID daily    Endo:   Transition to SSI regimen   Adjust insulin regimen as needed to maintain -180    MSK/Skin:  Early mobility and skin protection:   PT/OT consult as medically appropriate   Frequent turning and pressure off-loading  Local wound care as needed    Disposition:  Critical care    ICU Core Measures     A: Assess, Prevent, and Manage Pain Has pain been assessed? Yes  Need for changes to pain regimen? Yes   B: Both SAT/SAT  N/A   C: Choice of Sedation RASS Goal: 0 Alert and Calm or N/A patient not on sedation  Need for changes to sedation or analgesia regimen? NA   D: Delirium CAM-ICU: Negative   E: Early Mobility  Plan for early mobility? Yes   F: Family Engagement Plan for family engagement today? Yes       Review of Invasive Devices:    Davis Plan: Continue for accurate I/O monitoring for 48 hours. Remove once off bedrest   Central access plan: Medications requiring central line  [place] Plan: Keep arterial line for hemodynamic monitoring    Prophylaxis:  VTE VTE covered by:  heparin (porcine), Subcutaneous, 5,000 Units at 02/17/24 0606       Stress Ulcer  covered bypantoprazole (PROTONIX) EC tablet 40 mg [[id number]]          Significant 24hr Events      24 Hour Events/HPI: POD # 2 s/p CABG x2, MV repair with ring annuloplasty, IABP placement. PA catheter replaced due to poor positioning. Weaned milrinone to 0.25 from 0.38. Able to wean off all vasopressors and initiated cardene for high SVR. Given lasix 40mg IV x2 with good UOP  response.     Critical Care Infusions : Primacor 0.25 mcg/kg/min and Cardene 2.5 mg/hour   Subjective   Review of Systems   Cardiovascular:  Negative for chest pain.   Gastrointestinal:  Negative for nausea.   Musculoskeletal:  Positive for arthralgias (Bilateral upper extremity pain (elbow)) and myalgias.   Neurological:  Negative for dizziness.        Objective     Medications:  Scheduled Continuous   acetaminophen, 650 mg, Q6H While awake  amiodarone, 200 mg, Q8H JENN  aspirin, 325 mg, Daily  atorvastatin, 80 mg, Daily With Dinner  chlorhexidine, 15 mL, BID  heparin (porcine), 5,000 Units, Q8H JENN  melatonin, 3 mg, HS  methocarbamol, 500 mg, Q6H JENN  mupirocin, 1 Application, Q12H JENN  pantoprazole, 40 mg, Daily  polyethylene glycol, 17 g, Daily      insulin regular (HumuLIN R,NovoLIN R) 1 Units/mL in sodium chloride 0.9 % 100 mL infusion, 0.3-21 Units/hr, Last Rate: 1.5 Units/hr (02/17/24 0605)  milrinone (Primacor) infusion, 0.25 mcg/kg/min, Last Rate: 0.25 mcg/kg/min (02/16/24 1300)  niCARdipine, 2.5-15 mg/hr, Last Rate: 2.5 mg/hr (02/17/24 0441)  sodium chloride, 20 mL/hr, Last Rate: 20 mL/hr (02/16/24 2208)         Vitals: Invasive Monitoring       Most Recent Min/Max in 24hrs   Temp 98.2 °F (36.8 °C) Temp  Min: 97.5 °F (36.4 °C)  Max: 100.6 °F (38.1 °C)   Pulse 90 Pulse  Min: 83  Max: 93   Resp (!) 26 Resp  Min: 10  Max: 28   /73 No data recorded   O2 Sat 92 % SpO2  Min: 92 %  Max: 99 %   O2 Device: None (Room air)  Nasal Cannula O2 Flow Rate (L/min): 2 L/min Arterial Line  [place] BP 85/49  Arterial Line BP  Min: 75/45  Max: 126/38   MAP 67 mmHg  Arterial Line MAP (mmHg)  Min: 59 mmHg  Max: 75 mmHg     PA Catheter   Most Recent  Min/Max in 24hrs    PAP 34/9 PAP  Min: 25/12  Max: 41/14   CVP 3 mmHg CVP (mean)  Min: 2 mmHg  Max: 18 mmHg   CI 2 L/min/m2  CI (L/min/m2)  Min: 1.9 L/min/m2  Max: 2 L/min/m2    (dyne*sec)/cm5 SVR (dyne*sec)/cm5  Min: 752 (dyne*sec)/cm5  Max: 1582 (dyne*sec)/cm5         I/O Chest tube output (if present)     Intake/Output Summary (Last 24 hours) at 2/17/2024 0733  Last data filed at 2/17/2024 0600  Gross per 24 hour   Intake 2166.5 ml   Output 2070 ml   Net 96.5 ml     UOP: 150 ml/hour    BM: 0 in the last 24 hours  Weight change: 0.6 kg (1 lb 5.2 oz)     Mediastinal tubes:  50 mL/8hr  150 mL/24hr   Pleural tubes: 0 mL/8hr  40 mL/24hr        Physical Exam   Physical Exam  Vitals and nursing note reviewed.   Eyes:      Conjunctiva/sclera: Conjunctivae normal.      Pupils: Pupils are equal, round, and reactive to light.   Skin:     General: Skin is warm and dry.      Capillary Refill: Capillary refill takes less than 2 seconds.   HENT:      Head: Normocephalic and atraumatic.      Mouth/Throat:      Lips: Pink.      Mouth: Mucous membranes are moist.   Neck:      Vascular: Central line present.   Cardiovascular:      Rate and Rhythm: Normal rate and regular rhythm. No extrasystoles are present.     Pulses:           Radial pulses are 2+ on the right side and 2+ on the left side.        Dorsalis pedis pulses are 2+ on the right side and 2+ on the left side.      Heart sounds: Normal heart sounds.   Musculoskeletal:      Cervical back: Full passive range of motion without pain.      Right lower leg: No edema.      Left lower leg: No edema.   Abdominal: General: Abdomen is flat. There is no distension.      Palpations: Abdomen is soft.      Tenderness: There is no abdominal tenderness.       Constitutional:       General: He is awake. He is not in acute distress.     Appearance: He is well-developed.   Pulmonary:      Breath sounds: Examination of the right-lower field reveals decreased breath sounds. Examination of the left-lower field reveals decreased breath sounds. Decreased breath sounds present. No wheezing or rhonchi.   Chest:      Comments: CT with serosang output, no airleak  Psychiatric:         Behavior: Behavior is cooperative.   Neurological:      General: No focal  deficit present.      Mental Status: He is alert.      GCS: GCS eye subscore is 4. GCS verbal subscore is 5. GCS motor subscore is 6.   Genitourinary/Anorectal:  Davis present.        Diagnostic Studies        02/17/24 EKG: NSR    This was personally reviewed by myself.         Labs:  CBC    Recent Labs     02/15/24  1348 02/15/24  1353 02/16/24  0422 02/16/24  1226 02/17/24  0357   WBC  --   --  14.71*  --  19.60*   HGB 12.2   < > 12.8 12.4 12.1   HCT 39.4   < > 38.9  --  39.2     --  141*  --   --     < > = values in this interval not displayed.     BMP    Recent Labs     02/16/24 1226 02/17/24  0357   SODIUM 142 136   K 4.7 4.0   * 104   CO2 26 26   AGAP 4 6   BUN 23 29*   CREATININE 1.15 1.19   CALCIUM 7.8* 8.5        Baseline Creat: 0.88 - 1.15   Coags    Recent Labs     02/15/24  1348   INR 1.45*   PTT 30              Additional Electrolytes  Recent Labs     02/15/24  1353 02/16/24  0422 02/17/24  0357   MG  --  2.5 2.4   PHOS  --   --  3.9   CAIONIZED 1.28  --  1.09*          Blood Gas    Recent Labs     02/16/24 0425   PHART 7.457*   VLX1TMA 32.0*   PO2ART 177.5*   ZLE2ZXF 22.1   BEART -0.9   SOURCE Line, Arterial     Recent Labs     02/16/24  0425 02/16/24  1226 02/17/24  0357   PHVEN  --    < > 7.323   BSB7PJC  --    < > 49.3   PO2VEN  --    < > 40.2   HCH9RTR  --    < > 25.0   BEVEN  --    < > -1.5   M4JOLXA  --    < > 73.9   SOURCE Line, Arterial  --   --     < > = values in this interval not displayed.    LFTs  Recent Labs     02/16/24  0422 02/17/24  0357   ALT 48 28   AST 57* 46*   ALKPHOS 36 41   ALB 3.1* 3.3*   TBILI 1.07* 0.67       Infectious    No recent results     No recent results Glucose  Recent Labs     02/15/24  1348 02/16/24  0422 02/16/24  1226 02/17/24  0357   GLUC 101 152*  154* 159* 106            Collaborative bedside rounds performed with cardiac surgery attending, critical care attending and bedside RN.     OSCAR Renteria

## 2024-02-17 NOTE — PROGRESS NOTES
Progress Note - Cardiothoracic Surgery   Rohan Don 63 y.o. male MRN: [id number]  Unit/Bed#: [facility] 416-01 Encounter: [id number]      POD # 2 s/p CABG, MV repair, IABP    Pt seen/examined.  Interval history and data reviewed with critical care team.    Continues on inotropic and IABP support.  Extubated      Medications:   Scheduled Meds:  Current Facility-Administered Medications   Medication Dose Route Frequency Provider Last Rate    acetaminophen  650 mg Oral Q6H While awake Judi Francisca, PA-C      amiodarone  200 mg Oral Q8H JENN Judi Francisca, PA-C      aspirin  325 mg Oral Daily Judi Francisca, PA-C      atorvastatin  80 mg Oral Daily With Dinner Judi Francisca, PA-C      bisacodyl  10 mg Rectal Daily PRN Judi Francisca, PA-C      chlorhexidine  15 mL Mouth/Throat BID Judi Francisca, PA-C      furosemide  40 mg Intravenous BID (diuretic) OSCAR Renteria      heparin (porcine)  5,000 Units Subcutaneous Q8H JENN Judimorenita Espinosa, PA-C      insulin lispro  1-5 Units Subcutaneous TID AC OSCAR Renteria      insulin lispro  1-5 Units Subcutaneous HS OSCAR Renteria      melatonin  3 mg Oral HS Judimorenita Espinosa PA-PADMA      methocarbamol  500 mg Oral Q6H JENN OSCAR Renteria      milrinone (Primacor) infusion  0.25 mcg/kg/min Intravenous Continuous OSCAR Renteria 0.25 mcg/kg/min (02/17/24 0845)    mupirocin  1 Application Nasal Q12H JENN Judi Francisca, PA-C      niCARdipine  2.5-15 mg/hr Intravenous Titrated Judi Francisca, PA-C Stopped (02/17/24 1009)    ondansetron  4 mg Intravenous Q6H PRN Judi Francisca, PA-C      oxyCODONE  2.5 mg Oral Q4H PRN Judi Francisca, PA-C      Or    oxyCODONE  5 mg Oral Q4H PRN Judi Francisca, PA-C      pantoprazole  40 mg Oral Daily Judi Francisca, PA-C      polyethylene glycol  17 g Oral Daily Judi Frnacisca, PA-C      potassium chloride  20 mEq Oral BID OSCAR Renteria      temazepam  15 mg Oral HS PRN OSCAR Renteria       Continuous  "Infusions:milrinone (Primacor) infusion, 0.25 mcg/kg/min, Last Rate: 0.25 mcg/kg/min (02/17/24 0845)  niCARdipine, 2.5-15 mg/hr, Last Rate: Stopped (02/17/24 1009)      PRN Meds:.  bisacodyl    ondansetron    oxyCODONE **OR** oxyCODONE    temazepam    Vitals: Blood pressure 104/73, pulse 92, temperature 98.3 °F (36.8 °C), temperature source Core, resp. rate (!) 27, height 5' 11\" (1.803 m), weight 59.4 kg (130 lb 15.3 oz), SpO2 93%.,Body mass index is 18.26 kg/m².  I/O last 24 hours:  In: 2417.8 [P.O.:960; I.V.:1207.8; NG/GT:100; IV Piggyback:150]  Out: 2325 [Urine:2095; Chest Tube:230]  Invasive Devices       Central Venous Catheter Line  Duration             CVC Central Lines 02/15/24 2 days    Introducer 02/15/24 2 days              Peripheral Intravenous Line  Duration             Peripheral IV 02/13/24 Right Antecubital 4 days    Peripheral IV 02/15/24 Left Arm 2 days    Peripheral IV 02/15/24 Left;Ventral (anterior) Hand 2 days              Arterial Line  Duration             Arterial Line 02/15/24 Right Brachial 2 days              Line  Duration             IABP 8.0 Fr. 50 mL 2 days    Pacer Wires 1 day    Pacer Wires 1 day              Drain  Duration             Chest Tube 1 Left Pleural 32 Fr. 2 days    Chest Tube 2 Mediastinal;Posterior 32 Fr. 2 days    Urethral Catheter Non-latex;Straight-tip 16 Fr. 2 days    Chest Tube 3 Mediastinal;Anterior 32 Fr. 1 day                      Lab, Imaging and other studies:   Results from last 7 days   Lab Units 02/17/24  0357 02/16/24  1226 02/16/24  0422 02/15/24  2229 02/15/24  2052 02/15/24  1353 02/15/24  1348 02/15/24  1147 02/15/24  1134 02/15/24  0822 02/15/24  0533   WBC Thousand/uL 19.60*  --  14.71*  --   --   --   --   --   --   --  8.66   HEMOGLOBIN g/dL 12.1 12.4 12.8   < > 12.9   < > 12.2  --   --   --  16.7   I STAT HEMOGLOBIN   --   --   --   --   --    < >  --    < >  --    < >  --    HEMATOCRIT % 39.2  --  38.9  --  40.2  --  39.4  --   --   --  53.1* "   HEMATOCRIT, ISTAT   --   --   --   --   --    < >  --    < >  --    < >  --    PLATELETS Thousands/uL  --   --  141*  --   --   --  151  --  206  --  281    < > = values in this interval not displayed.     Results from last 7 days   Lab Units 02/17/24  0357 02/16/24  1226 02/16/24  0422 02/15/24  1808 02/15/24  1353   POTASSIUM mmol/L 4.0 4.7 4.8  4.9   < >  --    CHLORIDE mmol/L 104 112* 109*  111*  --   --    CO2 mmol/L 26 26 22  22  --   --    CO2, I-STAT mmol/L  --   --   --   --  21   BUN mg/dL 29* 23 20  20  --   --    CREATININE mg/dL 1.19 1.15 1.10  1.11  --   --    GLUCOSE, ISTAT mg/dl  --   --   --   --  100   CALCIUM mg/dL 8.5 7.8* 7.8*  8.1*  --   --     < > = values in this interval not displayed.     Results from last 7 days   Lab Units 02/15/24  1348   INR  1.45*   PTT seconds 30     Recent Labs     02/16/24  0425   PHART 7.457*   VNE0ILL 22.1   PO2ART 177.5*   [id number] 32.0*   BEART -0.9           Plan:    DC IABP support.  Cont. Vashon/Cordis/Dyana/Davis.  Continue chest tubes, pacing wires.  Incentive spirometry.  Diuresis.  PO ASA/Statin      SIGNATURE: Gerber Estrella MD  DATE: February 17, 2024  TIME: 10:56 AM

## 2024-02-17 NOTE — PROGRESS NOTES
Cardiology Progress Note - Rohan Don 63 y.o. male MRN: [id number]    Unit/Bed#: [facility] 416-01 Encounter: [id number]      Assessment:  Principal Problem:    Coronary artery disease  Active Problems:    Acute systolic congestive heart failure (HCC)    Prediabetes    DEISI (acute kidney injury) (HCC)    Elevated transaminase level    Vitamin D deficiency    HTN (hypertension)    Pleural effusion    Severe protein-calorie malnutrition (HCC)    Nodule of upper lobe of right lung    Renal calculi    S/P CABG x 2    S/P MVR (mitral valve repair)    Ischemic cardiomyopathy    Severe left ventricular systolic dysfunction    63 y.o. year old male who presented with shortness of breath and worsening leg swelling for 2 weeks.  Patient was found to have HFrEF, eventually underwent ischemic workup showing multivessel disease s/p CABG x 2 and MVR(12/15/2024).  Today is POD #2.     ADHF w/ Reduced EF. NYHA Class III/ACC Stage C. Ischemic cardiomyopathy  Total output 1L net +100 cc  Bed scale weight 130 lb  Venous oxygen 73.9  Creatinine 1.19    Current pressor requirements:  Levophed: 4 mcg  Milrinone: 0.25  Mechanical support  Currently on balloon pump at 1:1     Hemodynamics:   MAP: 67  CVP:8  PA: 41/14/24  PCW 20  CO/CI: 7 L/min/4 L/min/m2 ([place])       MvCAD s/p CABG x2 and MV Annuloplasty ( SVG to RCA and LIMA to LAD).   Prediabetes. A1c 6.1  Hypertension        Plan:  IABP currently at 1:1, unassisted /58. Wean off IABP  Continue milrinone 0.25 mcg/kg/min  Wean off nicardipine 25 mg/hr  GDMT: currently on hold. Would consider addition of lopressor 12.5 mg bid tomorrow if hemodynamically stable  Patient examines euvolemic. S/p IV lasix 40 mg x in the past 24 hours.   Hold off on beta-blockers for now  Continue Po amiodarone 200 mg tid, aspirin, statin  Repeat limited TTE prior to discharge to re evaluate EF      Subjective:   Patient seen and examined.  No significant events overnight.  Now off norepinephrine and  "vasopressin, received 2 doses of IV lasix. Denies orthopnea, PND, chest pain.    Objective:     Vitals: Blood pressure 104/73, pulse 90, temperature 98.2 °F (36.8 °C), temperature source Core, resp. rate (!) 26, height 5' 11\" (1.803 m), weight 59.4 kg (130 lb 15.3 oz), SpO2 92%., Body mass index is 18.26 kg/m².,   Orthostatic Blood Pressures      Flowsheet Row Most Recent Value   Blood Pressure 104/73 filed at 02/15/2024 0255   Patient Position - Orthostatic VS Lying filed at 02/14/2024 1525              Intake/Output Summary (Last 24 hours) at 2/17/2024 0823  Last data filed at 2/17/2024 0600  Gross per 24 hour   Intake 2056.4 ml   Output 2010 ml   Net 46.4 ml       No significant arrhythmias seen on telemetry review.       Physical Exam:    GEN: Rohan Don appears well, alert and oriented x 3, pleasant and cooperative , lying flat comfortably in bed  HEENT: pupils equal, round, and reactive to light; extraocular muscles intact  NECK: supple, no carotid bruits, swan catheter+  HEART: regular rhythm, normal S1 and S2, IABP+  LUNGS: clear to auscultation bilaterally; no wheezes, rales, or rhonchi   ABDOMEN: normal bowel sounds, soft, no tenderness, no distention  EXTREMITIES: left leg wrapped in ACE wrap, no right LLE, good pedal pulses B/L, warm to touch  NEURO: no focal findings   SKIN: normal without suspicious lesions on exposed skin    Medications:      Current Facility-Administered Medications:     acetaminophen (TYLENOL) tablet 650 mg, 650 mg, Oral, Q6H While awake, Judi Francisca, PA-C, 650 mg at 02/16/24 2100    amiodarone tablet 200 mg, 200 mg, Oral, Q8H JENN, Judi Francisca, PA-C, 200 mg at 02/17/24 0606    aspirin tablet 325 mg, 325 mg, Oral, Daily, Judi Francisca, PA-C, 325 mg at 02/16/24 0846    atorvastatin (LIPITOR) tablet 80 mg, 80 mg, Oral, Daily With Dinner, Judi Francisca, PA-C, 80 mg at 02/16/24 1756    bisacodyl (DULCOLAX) rectal suppository 10 mg, 10 mg, Rectal, Daily PRN, Judi Espinosa PA-C    " chlorhexidine (PERIDEX) 0.12 % oral rinse 15 mL, 15 mL, Mouth/Throat, BID, Judi Francisca, PA-C, 15 mL at 02/16/24 1756    heparin (porcine) subcutaneous injection 5,000 Units, 5,000 Units, Subcutaneous, Q8H JENN, 5,000 Units at 02/17/24 0606 **AND** [CANCELED] Platelet count, , , Once, Judi Francisca, PA-C    HYDROmorphone (DILAUDID) injection 0.5 mg, 0.5 mg, Intravenous, Q2H PRN, Judi Francisca, PA-C, 0.5 mg at 02/17/24 0243    insulin regular (HumuLIN R,NovoLIN R) 1 Units/mL in sodium chloride 0.9 % 100 mL infusion, 0.3-21 Units/hr, Intravenous, Titrated, Judi Francisca, PA-C, Last Rate: 1.5 mL/hr at 02/17/24 0605, 1.5 Units/hr at 02/17/24 0605    melatonin tablet 3 mg, 3 mg, Oral, HS, Judi Francisca, PA-C, 3 mg at 02/16/24 2113    methocarbamol (ROBAXIN) tablet 500 mg, 500 mg, Oral, Q6H JENN, OSCAR Renteria    milrinone (PRIMACOR) 20 mg in 100 mL infusion (premix), 0.25 mcg/kg/min, Intravenous, Continuous, OSCAR Renteria, Last Rate: 4.2 mL/hr at 02/16/24 1300, 0.25 mcg/kg/min at 02/16/24 1300    mupirocin (BACTROBAN) 2 % nasal ointment 1 Application, 1 Application, Nasal, Q12H JENN, Judi Francisca, PA-C, 1 Application at 02/16/24 2113    niCARdipine (CARDENE) 25 mg (STANDARD CONCENTRATION) in sodium chloride 0.9% 250 mL, 2.5-15 mg/hr, Intravenous, Titrated, Judi Francisca, PA-C, Last Rate: 25 mL/hr at 02/17/24 0441, 2.5 mg/hr at 02/17/24 0441    ondansetron (ZOFRAN) injection 4 mg, 4 mg, Intravenous, Q6H PRN, Judi Francisca, PA-C    oxyCODONE (ROXICODONE) split tablet 2.5 mg, 2.5 mg, Oral, Q4H PRN **OR** oxyCODONE (ROXICODONE) IR tablet 5 mg, 5 mg, Oral, Q4H PRN, Judi Francisca, PA-C, 5 mg at 02/17/24 0606    pantoprazole (PROTONIX) EC tablet 40 mg, 40 mg, Oral, Daily, Judi Francisca, PA-C    polyethylene glycol (MIRALAX) packet 17 g, 17 g, Oral, Daily, Judi Francisca, PA-C    sodium chloride infusion 0.45 %, 20 mL/hr, Intravenous, Continuous, Judi Francisca, PA-C, Last Rate: 20 mL/hr at 02/16/24 2208, 20 mL/hr at  02/16/24 2208    temazepam (RESTORIL) capsule 15 mg, 15 mg, Oral, HS PRN, OSCAR Renteria     Labs & Results:        Results from last 7 days   Lab Units 02/17/24  0357 02/16/24  1226 02/16/24  0422 02/15/24  2229 02/15/24  2052 02/15/24  1353 02/15/24  1348 02/15/24  1147 02/15/24  1134 02/15/24  0822 02/15/24  0533   WBC Thousand/uL 19.60*  --  14.71*  --   --   --   --   --   --   --  8.66   HEMOGLOBIN g/dL 12.1 12.4 12.8   < > 12.9   < > 12.2  --   --   --  16.7   I STAT HEMOGLOBIN   --   --   --   --   --    < >  --    < >  --    < >  --    HEMATOCRIT % 39.2  --  38.9  --  40.2  --  39.4  --   --   --  53.1*   HEMATOCRIT, ISTAT   --   --   --   --   --    < >  --    < >  --    < >  --    PLATELETS Thousands/uL  --   --  141*  --   --   --  151  --  206  --  281    < > = values in this interval not displayed.         Results from last 7 days   Lab Units 02/17/24  0357 02/16/24  1226 02/16/24  0422 02/15/24  1808 02/15/24  1353 02/15/24  1348 02/15/24  1246 02/15/24  1147 02/15/24  0822 02/15/24  0533   POTASSIUM mmol/L 4.0 4.7 4.8  4.9   < >  --    < >  --   --   --  4.3   CHLORIDE mmol/L 104 112* 109*  111*  --   --    < >  --   --   --  105   CO2 mmol/L 26 26 22  22  --   --    < >  --   --   --  21   CO2, I-STAT mmol/L  --   --   --   --  21  --  22 26   < >  --    BUN mg/dL 29* 23 20  20  --   --    < >  --   --   --  25   CREATININE mg/dL 1.19 1.15 1.10  1.11  --   --    < >  --   --   --  0.90   CALCIUM mg/dL 8.5 7.8* 7.8*  8.1*  --   --    < >  --   --   --  9.5   ALK PHOS U/L 41  --  36  --   --   --   --   --   --  62   ALT U/L 28  --  48  --   --   --   --   --   --  81*   AST U/L 46*  --  57*  --   --   --   --   --   --  47*   GLUCOSE, ISTAT mg/dl  --   --   --   --  100  --  158* 247*   < >  --     < > = values in this interval not displayed.     Results from last 7 days   Lab Units 02/15/24  1348   INR  1.45*   PTT seconds 30     Results from last 7 days   Lab Units 02/17/24  0354  02/16/24  0422 02/12/24  0544   MAGNESIUM mg/dL 2.4 2.5 1.9     EKG personally reviewed by Linden Amezquita MD.

## 2024-02-17 NOTE — PHYSICAL THERAPY NOTE
PHYSICAL THERAPY NOTE          Patient Name: Rohan Don  Today's Date: 2/17/2024 02/17/24 1216   PT Last Visit   PT Visit Date 02/17/24   Note Type   Note type Cancelled Session   Cancel Reasons Medical status   Additional Comments IABP     PT hold on PT evaluation/treatment at this time d/t IABP in place. PT to evaluate as able.    Faiza Rios, PT, DPT

## 2024-02-18 LAB
ABO GROUP BLD BPU: NORMAL
ANION GAP SERPL CALCULATED.3IONS-SCNC: 6 MMOL/L
BASE EX.OXY STD BLDV CALC-SCNC: 68.9 % (ref 60–80)
BASE EXCESS BLDV CALC-SCNC: 7.6 MMOL/L
BPU ID: NORMAL
BUN SERPL-MCNC: 27 MG/DL (ref 5–25)
CA-I BLD-SCNC: 1.1 MMOL/L (ref 1.12–1.32)
CALCIUM SERPL-MCNC: 8.5 MG/DL (ref 8.4–10.2)
CHLORIDE SERPL-SCNC: 97 MMOL/L (ref 96–108)
CO2 SERPL-SCNC: 33 MMOL/L (ref 21–32)
CREAT SERPL-MCNC: 0.78 MG/DL (ref 0.6–1.3)
ERYTHROCYTE [DISTWIDTH] IN BLOOD BY AUTOMATED COUNT: 13.3 % (ref 11.6–15.1)
GFR SERPL CREATININE-BSD FRML MDRD: 96 ML/MIN/1.73SQ M
GLUCOSE SERPL-MCNC: 124 MG/DL (ref 65–140)
GLUCOSE SERPL-MCNC: 126 MG/DL (ref 65–140)
GLUCOSE SERPL-MCNC: 134 MG/DL (ref 65–140)
GLUCOSE SERPL-MCNC: 138 MG/DL (ref 65–140)
HCO3 BLDV-SCNC: 32.9 MMOL/L (ref 24–30)
HCT VFR BLD AUTO: 36.2 % (ref 36.5–49.3)
HGB BLD-MCNC: 11.6 G/DL (ref 12–17)
MAGNESIUM SERPL-MCNC: 2.4 MG/DL (ref 1.9–2.7)
MCH RBC QN AUTO: 29.1 PG (ref 26.8–34.3)
MCHC RBC AUTO-ENTMCNC: 32 G/DL (ref 31.4–37.4)
MCV RBC AUTO: 91 FL (ref 82–98)
O2 CT BLDV-SCNC: 12.2 ML/DL
PCO2 BLDV: 49.5 MM HG (ref 42–50)
PH BLDV: 7.44 [PH] (ref 7.3–7.4)
PHOSPHATE SERPL-MCNC: 2.5 MG/DL (ref 2.3–4.1)
PLATELET # BLD AUTO: 141 THOUSANDS/UL (ref 149–390)
PMV BLD AUTO: 9.6 FL (ref 8.9–12.7)
PO2 BLDV: 36.3 MM HG (ref 35–45)
POTASSIUM SERPL-SCNC: 3.8 MMOL/L (ref 3.5–5.3)
RBC # BLD AUTO: 3.99 MILLION/UL (ref 3.88–5.62)
SODIUM SERPL-SCNC: 136 MMOL/L (ref 135–147)
UNIT DISPENSE STATUS: NORMAL
UNIT PRODUCT CODE: NORMAL
UNIT PRODUCT VOLUME: 236 ML
UNIT RH: NORMAL
WBC # BLD AUTO: 15.02 THOUSAND/UL (ref 4.31–10.16)

## 2024-02-18 PROCEDURE — 97535 SELF CARE MNGMENT TRAINING: CPT

## 2024-02-18 PROCEDURE — 82948 REAGENT STRIP/BLOOD GLUCOSE: CPT

## 2024-02-18 PROCEDURE — 85027 COMPLETE CBC AUTOMATED: CPT | Performed by: NURSE PRACTITIONER

## 2024-02-18 PROCEDURE — 82330 ASSAY OF CALCIUM: CPT | Performed by: NURSE PRACTITIONER

## 2024-02-18 PROCEDURE — 94664 DEMO&/EVAL PT USE INHALER: CPT

## 2024-02-18 PROCEDURE — 99024 POSTOP FOLLOW-UP VISIT: CPT | Performed by: THORACIC SURGERY (CARDIOTHORACIC VASCULAR SURGERY)

## 2024-02-18 PROCEDURE — NC001 PR NO CHARGE: Performed by: NURSE PRACTITIONER

## 2024-02-18 PROCEDURE — 99233 SBSQ HOSP IP/OBS HIGH 50: CPT | Performed by: ANESTHESIOLOGY

## 2024-02-18 PROCEDURE — 97164 PT RE-EVAL EST PLAN CARE: CPT

## 2024-02-18 PROCEDURE — 84100 ASSAY OF PHOSPHORUS: CPT | Performed by: NURSE PRACTITIONER

## 2024-02-18 PROCEDURE — 82805 BLOOD GASES W/O2 SATURATION: CPT | Performed by: THORACIC SURGERY (CARDIOTHORACIC VASCULAR SURGERY)

## 2024-02-18 PROCEDURE — 97168 OT RE-EVAL EST PLAN CARE: CPT

## 2024-02-18 PROCEDURE — 80048 BASIC METABOLIC PNL TOTAL CA: CPT | Performed by: NURSE PRACTITIONER

## 2024-02-18 PROCEDURE — 83735 ASSAY OF MAGNESIUM: CPT | Performed by: NURSE PRACTITIONER

## 2024-02-18 PROCEDURE — 99232 SBSQ HOSP IP/OBS MODERATE 35: CPT | Performed by: STUDENT IN AN ORGANIZED HEALTH CARE EDUCATION/TRAINING PROGRAM

## 2024-02-18 RX ORDER — ALBUMIN, HUMAN INJ 5% 5 %
12.5 SOLUTION INTRAVENOUS ONCE
Status: COMPLETED | OUTPATIENT
Start: 2024-02-18 | End: 2024-02-18

## 2024-02-18 RX ORDER — FUROSEMIDE 10 MG/ML
40 INJECTION INTRAMUSCULAR; INTRAVENOUS DAILY
Status: DISCONTINUED | OUTPATIENT
Start: 2024-02-19 | End: 2024-02-20

## 2024-02-18 RX ORDER — CALCIUM GLUCONATE 20 MG/ML
2 INJECTION, SOLUTION INTRAVENOUS ONCE
Status: COMPLETED | OUTPATIENT
Start: 2024-02-18 | End: 2024-02-18

## 2024-02-18 RX ORDER — POTASSIUM CHLORIDE 20 MEQ/1
40 TABLET, EXTENDED RELEASE ORAL ONCE
Status: COMPLETED | OUTPATIENT
Start: 2024-02-18 | End: 2024-02-18

## 2024-02-18 RX ORDER — POTASSIUM CHLORIDE 20 MEQ/1
20 TABLET, EXTENDED RELEASE ORAL DAILY
Status: DISCONTINUED | OUTPATIENT
Start: 2024-02-19 | End: 2024-02-21 | Stop reason: HOSPADM

## 2024-02-18 RX ADMIN — NICARDIPINE HYDROCHLORIDE 2.5 MG/HR: 2.5 INJECTION, SOLUTION INTRAVENOUS at 12:02

## 2024-02-18 RX ADMIN — Medication 12.5 MG: at 20:12

## 2024-02-18 RX ADMIN — OXYCODONE HYDROCHLORIDE 5 MG: 5 TABLET ORAL at 14:26

## 2024-02-18 RX ADMIN — AMIODARONE HYDROCHLORIDE 200 MG: 200 TABLET ORAL at 14:26

## 2024-02-18 RX ADMIN — Medication 12.5 MG: at 08:06

## 2024-02-18 RX ADMIN — MELATONIN 3 MG: at 20:52

## 2024-02-18 RX ADMIN — OXYCODONE HYDROCHLORIDE 5 MG: 5 TABLET ORAL at 02:23

## 2024-02-18 RX ADMIN — ALBUMIN (HUMAN) 12.5 G: 12.5 INJECTION, SOLUTION INTRAVENOUS at 12:33

## 2024-02-18 RX ADMIN — OXYCODONE HYDROCHLORIDE 5 MG: 5 TABLET ORAL at 05:50

## 2024-02-18 RX ADMIN — ATORVASTATIN CALCIUM 80 MG: 80 TABLET, FILM COATED ORAL at 16:12

## 2024-02-18 RX ADMIN — FUROSEMIDE 40 MG: 10 INJECTION, SOLUTION INTRAMUSCULAR; INTRAVENOUS at 08:06

## 2024-02-18 RX ADMIN — ACETAMINOPHEN 650 MG: 325 TABLET, FILM COATED ORAL at 20:12

## 2024-02-18 RX ADMIN — POLYETHYLENE GLYCOL 3350 17 G: 17 POWDER, FOR SOLUTION ORAL at 08:05

## 2024-02-18 RX ADMIN — ACETAMINOPHEN 650 MG: 325 TABLET, FILM COATED ORAL at 14:26

## 2024-02-18 RX ADMIN — POTASSIUM CHLORIDE 20 MEQ: 1500 TABLET, EXTENDED RELEASE ORAL at 08:06

## 2024-02-18 RX ADMIN — METHOCARBAMOL 500 MG: 500 TABLET ORAL at 05:50

## 2024-02-18 RX ADMIN — HEPARIN SODIUM 5000 UNITS: 5000 INJECTION INTRAVENOUS; SUBCUTANEOUS at 05:52

## 2024-02-18 RX ADMIN — HEPARIN SODIUM 5000 UNITS: 5000 INJECTION INTRAVENOUS; SUBCUTANEOUS at 14:27

## 2024-02-18 RX ADMIN — ASPIRIN 325 MG ORAL TABLET 325 MG: 325 PILL ORAL at 08:05

## 2024-02-18 RX ADMIN — ALBUMIN (HUMAN) 12.5 G: 12.5 INJECTION, SOLUTION INTRAVENOUS at 10:02

## 2024-02-18 RX ADMIN — METHOCARBAMOL 500 MG: 500 TABLET ORAL at 17:57

## 2024-02-18 RX ADMIN — MUPIROCIN 1 APPLICATION: 20 OINTMENT TOPICAL at 08:06

## 2024-02-18 RX ADMIN — ACETAMINOPHEN 650 MG: 325 TABLET, FILM COATED ORAL at 08:06

## 2024-02-18 RX ADMIN — AMIODARONE HYDROCHLORIDE 200 MG: 200 TABLET ORAL at 20:52

## 2024-02-18 RX ADMIN — DOCUSATE SODIUM 100 MG: 100 CAPSULE, LIQUID FILLED ORAL at 08:06

## 2024-02-18 RX ADMIN — OXYCODONE HYDROCHLORIDE 5 MG: 5 TABLET ORAL at 10:02

## 2024-02-18 RX ADMIN — DOCUSATE SODIUM 100 MG: 100 CAPSULE, LIQUID FILLED ORAL at 17:57

## 2024-02-18 RX ADMIN — POTASSIUM CHLORIDE 40 MEQ: 1500 TABLET, EXTENDED RELEASE ORAL at 06:39

## 2024-02-18 RX ADMIN — CALCIUM GLUCONATE 2 G: 20 INJECTION, SOLUTION INTRAVENOUS at 06:39

## 2024-02-18 RX ADMIN — CHLORHEXIDINE GLUCONATE 15 ML: 1.2 SOLUTION ORAL at 08:06

## 2024-02-18 RX ADMIN — CHLORHEXIDINE GLUCONATE 15 ML: 1.2 SOLUTION ORAL at 17:57

## 2024-02-18 RX ADMIN — PANTOPRAZOLE SODIUM 40 MG: 40 TABLET, DELAYED RELEASE ORAL at 08:06

## 2024-02-18 RX ADMIN — AMIODARONE HYDROCHLORIDE 200 MG: 200 TABLET ORAL at 05:50

## 2024-02-18 RX ADMIN — ONDANSETRON 4 MG: 2 INJECTION INTRAMUSCULAR; INTRAVENOUS at 02:42

## 2024-02-18 RX ADMIN — HEPARIN SODIUM 5000 UNITS: 5000 INJECTION INTRAVENOUS; SUBCUTANEOUS at 20:52

## 2024-02-18 RX ADMIN — METHOCARBAMOL 500 MG: 500 TABLET ORAL at 12:02

## 2024-02-18 RX ADMIN — MUPIROCIN 1 APPLICATION: 20 OINTMENT TOPICAL at 20:12

## 2024-02-18 NOTE — PLAN OF CARE
Problem: OCCUPATIONAL THERAPY ADULT  Goal: Performs self-care activities at highest level of function for planned discharge setting.  See evaluation for individualized goals.  Description: Treatment Interventions: ADL retraining, Functional transfer training, Equipment evaluation/education, Continued evaluation, Cardiac education, Activityengagement, Energy conservation          See flowsheet documentation for full assessment, interventions and recommendations.   Outcome: Progressing  Note: Limitation: Decreased ADL status, Decreased endurance, Decreased self-care trans, Decreased high-level ADLs  Prognosis: Good  Assessment: Pt is a 63 y.o. male who was admitted to [facility] on 2/9/2024 with Coronary artery disease, now s/p CABG x 2 and MVR. Pt seen for an OT re-evaluation per active OT orders, [place]/A-line/CT in place.  Pt with PMH including acute systolic CHF, DEISI, vitamin D deficiency, HTN, pleural effusion, nodule of upper lobe of R lung, renal calculi, ischemic cardiomyopathy. Please see IE for information on home setup and PLOF. Currently, pt is Supervision for UB ADL, Min Ax1 for LB ADL, and completed transfers/FM w Min Ax1 with RW. Pt currently presents with impairments in the following categories -steps to enter environment and difficulty performing ADLS activity tolerance and endurance. These impairments, as well as pt's fatigue, CELESTE, pain, and cardiac/sternal precautions  limit pt's ability to safely engage in all baseline areas of occupation, includinggrooming, bathing, dressing, toileting, and functional mobility/transfers.    The patient's raw score on the AM-PAC Daily Activity Inpatient Short Form is 19. A raw score of greater than or equal to 19 suggests the patient may benefit from discharge to home. Please refer to the recommendation of the Occupational Therapist for safe discharge planning. Pt would benefit from continued acute OT services throughout hospital course. Plan for OT  interventions 2-3x per week. From OT standpoint, recommend home with increased social support, no further OT services upon D/C. Pt was left seated in bedside chair with all needs within reach.     Rehab Resource Intensity Level, OT: No post-acute rehabilitation needs

## 2024-02-18 NOTE — PROCEDURES
Procedure: Epicardial Wire Removal    02/18/24    Patient was returned to bed. EPW x 2 removed in typical fashion.  Patient tolerated well without immediate complications. Site dressed with Acticoat. Patient and nurse aware of mandatory 1 hour bedrest protocol. Vital signs ordered q15 minutes for one hour per protocol.    Procedure: Chest Tube Removal    02/18/24    Mediastinal chest tube x 2 and left pleural chest tube x 1 removed in typical fashion. Patient tolerated procedure well. No immediate complications. Site dressed with Acticoat dressing. Patient's nurse was made aware of removal.

## 2024-02-18 NOTE — PLAN OF CARE
Problem: PHYSICAL THERAPY ADULT  Goal: Performs mobility at highest level of function for planned discharge setting.  See evaluation for individualized goals.  Description: Treatment/Interventions: Functional transfer training, LE strengthening/ROM, Elevations, Therapeutic exercise, Endurance training, Equipment eval/education, Bed mobility, Gait training, Spoke to nursing, Spoke to advanced practitioner, OT  Equipment Recommended: Walker (at this time)       See flowsheet documentation for full assessment, interventions and recommendations.  2/18/2024 1111 by Kwesi Germain, PT  Note: Prognosis: Good  Problem List: Decreased strength, Decreased endurance, Impaired balance, Decreased mobility  Assessment: Functional mobility re-assessment performed; pt presents s/p CABG, MV repair, and IABP and currently exhibits min post op discomfort and guarding and overall weakness and deconditioning; min (A) was needed for transfers and amb trial w/ rw; pt remained in NAD w/ BP stable; will cont to follow pt in PT for progressive mobilization to max functional (I), endurance and safety. Otherwise, anticipate pt will return home w/ available family support upon D/C provided he cont improving w/ mobility skills, safety, and endurance and when medically cleared; `        Rehab Resource Intensity Level, PT: III (Minimum Resource Intensity)    See flowsheet documentation for full assessment.

## 2024-02-18 NOTE — PROGRESS NOTES
[facility]  Progress Note: Critical Care   Date of Service: 2/18/2024  Hospital Day: 9  Name: Rohan Don I  MRN: [id number]  Unit/Bed#: [facility] 416-01      Assessment/Plan     Impressions:  MVCAD s/p CABG x 2 (2/15/24)   Moderate to severe MR s/p MVR  Acute systolic heart failure (EF 25%)  HTN  Prediabetes  Thrombocytopenia   Severe protein-calorie malnutrition    Analgesia:     Tylenol 650mg q6h  Oxycodone 2.5 - 5mg q4h PRN  Robaxin 500mg q6h   Delirium precautions  Regulate sleep/wake cycle  Melatonin 3mg qHS   Restoril 15mg qHS PRN  CAM-ICU daily  Routine neuro checks       CV:   Cardiac Surgery Hemodynamic Monitoring Goals:  MAP > 65   SBP < 130  CI > 2.2  Continue to monitor and trend cardiac output and filling pressures   Continue pulmonary artery catheter for hemodynamic monitoring   Discontinue arterial line   Inotropic Support Plan:    Milrinone 0.13 mcg/kg/min  Hold now and monitor cardiac index   Consider removal of PA catheter in afternoon if stable   Vasopressor/Vasodilator plan:   Cardene currently off  Remove arterial line   Beta Blocker Plan:    Continue metoprolol tartrate 12.5 mg BID  Epicardial Pacing Wire Plan:    Epicardial pacing wire plan : No longer needed, discontinue  Post-op cardiac surgery/GDMT medications:     mg daily  Atorvastatin 80 mg qHS  Amiodarone 200 mg PO q8 hours   Continue close telemetry monitoring     Lung:   Continue to monitor oxygenation on room air    Chest tube drainage diminished; D/C today  Continue incentive spirometry and encourage coughing and deep breathing   CXR as needed     GI:   Continue PPI for stress ulcer prophylaxis  Bowel regimen:  Colace BID  Miralax daily   Dulcolax daily PRN  Monitor abdominal exam and bowel function    FEN:   Diuresis Plan: Lasix 40mg IV BID  Close monitoring of serum bicarb, creatinine   Nutrition plan: Cardiac diet, 1800mL fluid restriction   Replenish electrolytes with goals: K >4.0,  Mag >2.0, and Phos >3.0    :   Strict I/O monitoring   Discontinue Davis catheter   Continue to trend regnal function tests      ID:   Monitor WBC and fever curve off antibiotics     Heme:   CBC daily to monitor hemoglobin and platelets   VTE prophylaxis: SCD's as able based on vein harvest sites, SQH 5000 units TID       Endo:   Continue sliding scale regimen AC/HS   Adjust insulin regimen as needed to maintain -180    MSK/Skin:  Early mobility and skin protection:   PT/OT consult as medically appropriate   Frequent turning and pressure off-loading  Local wound care as needed      Disposition:  Critical care    ICU Core Measures     A: Assess, Prevent, and Manage Pain Has pain been assessed? Yes  Need for changes to pain regimen? No   B: Both SAT/SAT  N/A   C: Choice of Sedation RASS Goal: 0 Alert and Calm or N/A patient not on sedation  Need for changes to sedation or analgesia regimen? NA   D: Delirium CAM-ICU: Negative   E: Early Mobility  Plan for early mobility? Yes   F: Family Engagement Plan for family engagement today? Yes       Review of Invasive Devices:    Davis Plan: Davis to be removed. Order has been placed  Central access plan:  Continue per CTS post-op protocol   [place] Plan: Discontinue arterial line    Prophylaxis:  VTE VTE covered by:  heparin (porcine), Subcutaneous, 5,000 Units at 02/18/24 0552       Stress Ulcer  covered bypantoprazole (PROTONIX) EC tablet 40 mg [[id number]]          Significant 24hr Events      24 Hour Events/HPI: POD # 3 s/p  s/p CABG x2, MV repair with ring annuloplasty, IABP placement . IABP discontinued 2/17/24 without complication. 1u platelets given for the procedure. Milrinone weaned to 0.13mcg without change in cardiac index. On/off cardene infusion. 24-beat run of self-limiting NSVT. Given 150mg IV amiodarone, 2g mag, 20meq KCL with resolution. One episode of nausea with vomiting overnight.     Critical Care Infusions : Primacor 0.13 mcg/kg/min     Subjective    Review of Systems   Constitutional:  Positive for appetite change (Decreased).   Respiratory:  Negative for shortness of breath.    Cardiovascular:  Negative for chest pain.   Gastrointestinal:  Positive for nausea.   Musculoskeletal:  Positive for myalgias (chest wall).        Objective     Medications:  Scheduled Continuous   acetaminophen, 650 mg, Q6H While awake  amiodarone, 200 mg, Q8H JENN  aspirin, 325 mg, Daily  atorvastatin, 80 mg, Daily With Dinner  chlorhexidine, 15 mL, BID  docusate sodium, 100 mg, BID  furosemide, 40 mg, BID (diuretic)  heparin (porcine), 5,000 Units, Q8H JENN  insulin lispro, 1-5 Units, TID AC  insulin lispro, 1-5 Units, HS  melatonin, 3 mg, HS  methocarbamol, 500 mg, Q6H JENN  metoprolol tartrate, 12.5 mg, Q12H JENN  mupirocin, 1 Application, Q12H JENN  pantoprazole, 40 mg, Daily  polyethylene glycol, 17 g, Daily  potassium chloride, 20 mEq, BID      milrinone (Primacor) infusion, 0.13 mcg/kg/min, Last Rate: 0.13 mcg/kg/min (02/17/24 1726)  niCARdipine, 2.5-15 mg/hr, Last Rate: Stopped (02/18/24 0626)         Vitals: Invasive Monitoring       Most Recent Min/Max in 24hrs   Temp 98.4 °F (36.9 °C) Temp  Min: 98.2 °F (36.8 °C)  Max: 99 °F (37.2 °C)   Pulse 83 Pulse  Min: 83  Max: 97   Resp (!) 25 Resp  Min: 12  Max: 39   /55 BP  Min: 105/55  Max: 136/77   O2 Sat 93 % SpO2  Min: 90 %  Max: 95 %   O2 Device: None (Room air)  Nasal Cannula O2 Flow Rate (L/min): 2 L/min Arterial Line  Globe /50  Arterial Line BP  Min: 90/56  Max: 143/67   MAP 67 mmHg  Arterial Line MAP (mmHg)  Min: 67 mmHg  Max: 91 mmHg     PA Catheter   Most Recent  Min/Max in 24hrs    PAP 25/6 PAP  Min: 24/5  Max: 51/23   CVP 0 mmHg CVP (mean)  Min: -4 mmHg  Max: 18 mmHg   CI 2 L/min/m2  CI (L/min/m2)  Min: 1.9 L/min/m2  Max: 2 L/min/m2   SVR 1129 (dyne*sec)/cm5 SVR (dyne*sec)/cm5  Min: 655 (dyne*sec)/cm5  Max: 1582 (dyne*sec)/cm5        I/O Chest tube output (if present)     Intake/Output Summary (Last 24 hours)  at 2/18/2024 0747  Last data filed at 2/18/2024 0600  Gross per 24 hour   Intake 1424.26 ml   Output 3200 ml   Net -1775.74 ml     UOP:  ml/hour    BM: 0 in the last 24 hours  Weight change: -2 kg (-4 lb 6.6 oz)     Mediastinal tubes:  20 mL/8hr  70 mL/24hr   Pleural tubes: 20 mL/8hr  80 mL/24hr        Physical Exam   Physical Exam  Vitals and nursing note reviewed.   Eyes:      Conjunctiva/sclera: Conjunctivae normal.      Pupils: Pupils are equal, round, and reactive to light.   Skin:     General: Skin is warm and dry.      Capillary Refill: Capillary refill takes less than 2 seconds.   Neck:      Vascular: Central line present.   Cardiovascular:      Rate and Rhythm: Normal rate and regular rhythm. No extrasystoles are present.     Pulses:           Radial pulses are 2+ on the right side and 2+ on the left side.        Dorsalis pedis pulses are 2+ on the right side and 2+ on the left side.      Heart sounds: Normal heart sounds.   Musculoskeletal:      Right lower leg: No edema.      Left lower leg: No edema.   Abdominal: General: Abdomen is flat. There is no distension.      Palpations: Abdomen is soft.      Tenderness: There is no abdominal tenderness.   Constitutional:       General: He is awake. He is not in acute distress.     Appearance: He is well-developed and underweight.   Pulmonary:      Effort: Pulmonary effort is normal.      Breath sounds: Examination of the right-lower field reveals decreased breath sounds. Examination of the left-lower field reveals decreased breath sounds. Decreased breath sounds present.   Chest:          Comments: Chest tube with minimal serosang   Psychiatric:         Behavior: Behavior is cooperative.   Neurological:      General: No focal deficit present.      Mental Status: He is alert.      GCS: GCS eye subscore is 4. GCS verbal subscore is 5. GCS motor subscore is 6.   Genitourinary/Anorectal:     Comments: Clear, light yellow urine   Davis present.         Diagnostic Studies      02/18/24 EKG: NSR.   This was personally reviewed by myself.         Labs:  CBC    Recent Labs     02/17/24  0357 02/18/24  0406   WBC 19.60* 15.02*   HGB 12.1 11.6*   HCT 39.2 36.2*   PLT  --  141*     BMP    Recent Labs     02/17/24  1727 02/18/24  0406   SODIUM 134* 136   K 4.0 3.8   CL 96 97   CO2 31 33*   AGAP 7 6   BUN 24 27*   CREATININE 0.89 0.78   CALCIUM 8.8 8.5        Baseline Creat: 0.88 - 1.15   Coags    No recent results           Additional Electrolytes  Recent Labs     02/17/24  0357 02/18/24  0406   MG 2.4 2.4   PHOS 3.9 2.5   CAIONIZED 1.09* 1.10*          Blood Gas    No recent results  Recent Labs     02/18/24  0446   PHVEN 7.441*   [id number] 49.5   PO2VEN 36.3   [id number] 32.9*   BEVEN 7.6   U2ELANN 68.9    LFTs  Recent Labs     02/17/24  0357   ALT 28   AST 46*   ALKPHOS 41   ALB 3.3*   TBILI 0.67       Infectious    No recent results     No recent results Glucose  Recent Labs     02/16/24  1226 02/17/24  0357 02/17/24  1727 02/18/24  0406   GLUC 159* 106 146* 138            Collaborative bedside rounds performed with cardiac surgery attending, critical care attending and bedside RN.     OSCAR Renteria

## 2024-02-18 NOTE — OCCUPATIONAL THERAPY NOTE
Occupational Therapy Re-Evaluation and Treatment     Patient Name: Rohan Don  Today's Date: 2/18/2024  Problem List  Principal Problem:    Coronary artery disease  Active Problems:    Acute systolic congestive heart failure (HCC)    Prediabetes    DEISI (acute kidney injury) (HCC)    Elevated transaminase level    Vitamin D deficiency    HTN (hypertension)    Pleural effusion    Severe protein-calorie malnutrition (HCC)    Nodule of upper lobe of right lung    Renal calculi    S/P CABG x 2    S/P MVR (mitral valve repair)    Ischemic cardiomyopathy    Severe left ventricular systolic dysfunction    Past Medical History  History reviewed. No pertinent past medical history.  Past Surgical History  Past Surgical History:   Procedure Laterality Date    CARDIAC CATHETERIZATION N/A 2/12/2024    Procedure: Cardiac catheterization;  Surgeon: Shanon Pal DO;  [facility];  Service: Cardiology    CARDIAC CATHETERIZATION Left 2/12/2024    Procedure: Cardiac Left Heart Cath;  Surgeon: Shanon Pal DO;  [facility];  Service: Cardiology    CARDIAC CATHETERIZATION N/A 2/12/2024    Procedure: Cardiac Coronary Angiogram;  Surgeon: Shanon Pal DO;  [facility];  Service: Cardiology    DE CORONARY ARTERY BYP W/VEIN & ARTERY GRAFT 2 VEIN N/A 2/15/2024    Procedure: CORONARY ARTERY BYPASS GRAFT (CABG) X 2 VESSELS, SVG --> RCA, LIMA to LAD. MITRAL REPAIR WITH  32mm PHYSIO II MITRAL RING;  Surgeon: AGNES Shaw MD;  [facility];  Service: Cardiac Surgery    DE INSERTION INTRA-AORTIC BALLOON ASSIST DEV PERQ Right 2/15/2024    Procedure: INSERTION INTRA BALLOON PUMP AORTIC (IABP);  Surgeon: AGNES Shaw MD;  [facility];  Service: Cardiac Surgery           02/18/24 0916   OT Last Visit   OT Visit Date 02/18/24   Note Type   Note type Re-Evaluation   Pain Assessment   Pain Assessment Tool 0-10   Pain Score 4   Pain  "Location/Orientation Orientation: Mid;Location: Chest;Location: Incision   Patient's Stated Pain Goal No pain   Hospital Pain Intervention(s) Repositioned;Ambulation/increased activity   Restrictions/Precautions   Weight Bearing Precautions Per Order No   Other Precautions Cardiac/sternal;Multiple lines;Telemetry;Pain  ([place], A-line, CT)   Home Living   Type of Home House   Home Layout One level;Performs ADLs on one level   Bathroom Shower/Tub Walk-in shower   Bathroom Toilet Standard   Additional Comments Please see IE for full details regarding home setup and PLOF   Prior Function   Level of [place] Independent with ADLs;Independent with functional mobility   Lives With Spouse   Comments Please see IE for full details regarding home setup and PLOF   Lifestyle   Autonomy pt reports being fully independent w self care, mobility, home manageement   Reciprocal Relationships supportive wife   Intrinsic Gratification work on his car   Subjective   Subjective \"I don't feel too bad\"   ADL   Where Assessed Chair   Eating Assistance 5  Supervision/Setup   Grooming Assistance 5  Supervision/Setup   UB Bathing Assistance 5  Supervision/Setup   LB Bathing Assistance 4  Minimal Assistance   UB Dressing Assistance 5  Supervision/Setup   LB Dressing Assistance 4  Minimal Assistance   Toileting Assistance  4  Minimal Assistance   Functional Assistance 4  Minimal Assistance   Bed Mobility   Additional Comments Pt OOB in chair throughout session, left in chair with all needs within reach   Transfers   Sit to Stand 4  Minimal assistance   Additional items Assist x 1;Increased time required;Verbal cues   Stand to Sit 4  Minimal assistance   Additional items Assist x 1;Increased time required;Verbal cues   Additional Comments with RW   Functional Mobility   Functional Mobility 4  Minimal assistance  (CGA)   Additional Comments Pt performs a couple steps forwards in room with CGA with RW, deferred further mobility due to [place] "   Additional items Rolling walker   Balance   Static Sitting Fair +   Dynamic Sitting Fair   Static Standing Fair -   Dynamic Standing Poor +   Ambulatory Poor +   Activity Tolerance   Activity Tolerance Patient limited by pain;Patient tolerated treatment well   Medical Staff Made Aware Co-reeval with DPT due to medical complexity, assist from restorative Tia   Nurse Made Aware RN cleared   RUE Assessment   RUE Assessment WFL   LUE Assessment   LUE Assessment WFL   Hand Function   Gross Motor Coordination Functional   Fine Motor Coordination Functional   Sensation   Light Touch No apparent deficits   Psychosocial   Psychosocial (WDL) WDL   Cognition   Overall Cognitive Status WFL   Arousal/Participation Alert;Cooperative   Attention Within functional limits   Orientation Level Oriented X4   Memory Within functional limits   Following Commands Follows all commands and directions without difficulty   Comments Pt cooperative to therapy   Assessment   Limitation Decreased ADL status;Decreased endurance;Decreased self-care trans;Decreased high-level ADLs   Prognosis Good   Assessment Pt is a 63 y.o. male who was admitted to [facility] on 2/9/2024 with Coronary artery disease, now s/p CABG x 2 and MVR. Pt seen for an OT re-evaluation per active OT orders, [place]/A-line/CT in place.  Pt with PMH including acute systolic CHF, DEISI, vitamin D deficiency, HTN, pleural effusion, nodule of upper lobe of R lung, renal calculi, ischemic cardiomyopathy. Please see IE for information on home setup and PLOF. Currently, pt is Supervision for UB ADL, Min Ax1 for LB ADL, and completed transfers/FM w Min Ax1 with RW. Pt currently presents with impairments in the following categories -steps to enter environment and difficulty performing ADLS activity tolerance and endurance. These impairments, as well as pt's fatigue, CELESTE, pain, and cardiac/sternal precautions  limit pt's ability to safely engage in all baseline areas of  occupation, includinggrooming, bathing, dressing, toileting, and functional mobility/transfers.    The patient's raw score on the -PAC Daily Activity Inpatient Short Form is 19. A raw score of greater than or equal to 19 suggests the patient may benefit from discharge to home. Please refer to the recommendation of the Occupational Therapist for safe discharge planning. Pt would benefit from continued acute OT services throughout hospital course. Plan for OT interventions 2-3x per week. From OT standpoint, recommend home with increased social support, no further OT services upon D/C. Pt was left seated in bedside chair with all needs within reach.   Goals   Patient Goals to get better   Plan   Treatment Interventions ADL retraining;Functional transfer training;Equipment evaluation/education;Continued evaluation;Cardiac education;Activityengagement;Energy conservation   Goal Expiration Date 03/03/24   OT Frequency 2-3x/wk   Discharge Recommendation   Rehab Resource Intensity Level, OT No post-acute rehabilitation needs   -PAC Daily Activity Inpatient   Lower Body Dressing 3   Bathing 3   Toileting 3   Upper Body Dressing 3   Grooming 3   Eating 4   Daily Activity Raw Score 19   Daily Activity Standardized Score (Calc for Raw Score >=11) 40.22   -PAC Applied Cognition Inpatient   Following a Speech/Presentation 4   Understanding Ordinary Conversation 4   Taking Medications 4   Remembering Where Things Are Placed or Put Away 4   Remembering List of 4-5 Errands 4   Taking Care of Complicated Tasks 4   Applied Cognition Raw Score 24   Applied Cognition Standardized Score 62.21   Additional Treatment Session   Start Time 1030   End Time 1041   Treatment Assessment Pt provided s/p cardiac sx education packet, reviewed w/ OT, discussed cardiac/sternal precautions, lifestyle modifications, post hospitalization IADL management, environmental temperature control, emotional regulation and management, lifting/driving  restrictions, energy conservation techniques, mobility schedule, incisional management, VNA, and cardiac rehabilitation initiation upon clearance per physician at follow up. Pt reviewed education packet and acknowledged reception of such.   Additional Treatment Day 1   End of Consult   Education Provided Yes   Patient Position at End of Consult Bedside chair;All needs within reach   Nurse Communication Nurse aware of consult       OT Goals    - Pt will be Mod I with LB ADL by end of hospital course.    - Pt will be Mod I with UB ADL by end of hospital course.    - Pt will be Mod I with all functional transfers required for patient safety by end of hospital course.    - Pt will be Mod I with functional mobility to/from bathroom for increased independence with toileting tasks.    - Pt activity tolerance will increase to 30 minutes in order to safely engage in ADL and transfers.     - Pt standing tolerance will increase to 10 minutes  in order to promote sink side ADLs and IADL activities.    - Pt will recall all cardiac precautions during OT sessions to promote safety following hospital stay.    - Pt will verbalize and demonstrate understanding to cardiac packet following education in order to promote safety following hospital stay.          BISHOP Land, OTR/L

## 2024-02-18 NOTE — PROGRESS NOTES
"  HF- Progress Note   Rohan Don 63 y.o. male MRN: [id number]  Unit/Bed#: [facility] 416-01 Encounter: [id number]            Assessment:  Rohan Don is a 63 y.o. year old male who presented on 2 9 with shortness of breath and worsening leg swelling for 2 weeks.  Patient was found to have HFrEF, eventually underwent ischemic workup showing multivessel disease s/p CABG x 2 and MVR(12/15/2024).  Today is POD #3.    ADHF w/ Reduced EF. NYHA Class III/ACC Stage C. Ischemic cardiomyopathy  Current pressor requirements:  Milrinone: 0.13  Mechanical support  Balloon pump discontinued on 2/17  Hemodynamics:   MAP: 74  CVP:5  PA: 25/8  CO/CI: 4.3/2.9 (Cardis)  CO/CI: 4.7/2.8 (Ficks)  SVR: 910    MvCAD s/p CABG x2 and MV Annuloplasty ( SVG to RCA and LIMA to LAD).   Prediabetes. A1c 6.1  Hypertension      Plan:  Continue weaning milrinone  We will hold off pressors so patient was weaned off of inotropes  Patient put out 3.2 L after Lasix 40 x 1.  Examines euvolemic  Continue aspirin and statin  Continue aspirin and statin  Continue amiodarone      Subjective:     Complaining of some nausea and vomiting.  No other complaints at this time        Vitals: /64   Pulse 84   Temp 98.9 °F (37.2 °C) (Core)   Resp (!) 39   Ht 5' 11\" (1.803 m)   Wt 59.4 kg (130 lb 15.3 oz)   SpO2 93%   BMI 18.26 kg/m²       Intake/Output Summary (Last 24 hours) at 2/17/2024 2142  Last data filed at 2/17/2024 2000  Gross per 24 hour   Intake 1888.62 ml   Output 3575 ml   Net -1686.38 ml       24 Hours VS:   Temp:  [97.5 °F (36.4 °C)-99 °F (37.2 °C)] 98.9 °F (37.2 °C)  HR:  [84-97] 84  Resp:  [10-39] 39  BP: (115-131)/(58-71) 126/64  SpO2:  [90 %-95 %] 93 %     Review of System:  Review of system was conducted and was negative except for as stated in the subjective.    Physical Exam:  Physical Exam  Constitutional:       Appearance: He is not ill-appearing.   Cardiovascular:      Rate and Rhythm: Normal rate and regular rhythm.   Pulmonary: "      Breath sounds: No decreased breath sounds or wheezing.   Musculoskeletal:      Right lower leg: No edema.      Left lower leg: No edema.   Skin:     General: Skin is warm.   Neurological:      General: No focal deficit present.      Mental Status: He is alert and oriented to person, place, and time.          Inpatient medications:   Scheduled Meds:  Current Facility-Administered Medications   Medication Dose Route Frequency Provider Last Rate    acetaminophen  650 mg Oral Q6H While awake Judi Francisca, PA-C      amiodarone  200 mg Oral Q8H JENN Judi Francisca, PA-C      aspirin  325 mg Oral Daily Judi Francisca, PA-C      atorvastatin  80 mg Oral Daily With Dinner KEVIN Hurley-C      bisacodyl  10 mg Rectal Daily PRN Judi Francisca, PA-C      chlorhexidine  15 mL Mouth/Throat BID Judimorenita Espinosa, PA-C      [START ON 2/18/2024] docusate sodium  100 mg Oral BID OSCAR Renteria      furosemide  40 mg Intravenous BID (diuretic) OSCAR Renteria      heparin (porcine)  5,000 Units Subcutaneous Q8H JENN Judi Espinosa PANavdeepC      insulin lispro  1-5 Units Subcutaneous TID AC OSCAR Renteria      insulin lispro  1-5 Units Subcutaneous HS OSCAR Renteria      melatonin  3 mg Oral HS Judi Espinosa PA-C      methocarbamol  500 mg Oral Q6H [facility] OSCAR Renteria      metoprolol tartrate  12.5 mg Oral Q12H [facility] OSCAR Renteria      milrinone (Primacor) infusion  0.13 mcg/kg/min Intravenous Continuous OSCAR Renteria 0.13 mcg/kg/min (02/17/24 1726)    mupirocin  1 Application Nasal Q12H JENN Judimorenita Espinosa PANavdeepC      niCARdipine  2.5-15 mg/hr Intravenous Titrated Judimorenita Espinosa, PA-C Stopped (02/17/24 1009)    ondansetron  4 mg Intravenous Q6H PRN Judi Francisca, PA-C      oxyCODONE  2.5 mg Oral Q4H PRN Judi Francisca, PA-C      Or    oxyCODONE  5 mg Oral Q4H PRN Judi Francisca, PA-C      pantoprazole  40 mg Oral Daily Judi Francisca, PA-C      polyethylene glycol  17 g Oral  Daily Judi Espinosa PA-C      potassium chloride  20 mEq Oral BID OSCAR Renteria      temazepam  15 mg Oral HS PRN OSCAR Renteria       Continuous Infusions:milrinone (Primacor) infusion, 0.13 mcg/kg/min, Last Rate: 0.13 mcg/kg/min (02/17/24 1726)  niCARdipine, 2.5-15 mg/hr, Last Rate: Stopped (02/17/24 1009)      PRN Meds:.  bisacodyl    ondansetron    oxyCODONE **OR** oxyCODONE    temazepam     Labs & Results:  Results from last 7 days   Lab Units 02/17/24  1727 02/17/24  0357 02/16/24  1226   POTASSIUM mmol/L 4.0 4.0 4.7   CO2 mmol/L 31 26 26   CHLORIDE mmol/L 96 104 112*   BUN mg/dL 24 29* 23   CREATININE mg/dL 0.89 1.19 1.15     Results from last 7 days   Lab Units 02/17/24  0357 02/16/24  1226 02/16/24  0422 02/15/24  2229 02/15/24  2052 02/15/24  1353 02/15/24  1348 02/15/24  1147 02/15/24  1134   HEMOGLOBIN g/dL 12.1 12.4 12.8   < > 12.9   < > 12.2  --   --    I STAT HEMOGLOBIN   --   --   --   --   --    < >  --    < >  --    HEMATOCRIT % 39.2  --  38.9  --  40.2  --  39.4  --   --    HEMATOCRIT, ISTAT   --   --   --   --   --    < >  --    < >  --    PLATELETS Thousands/uL  --   --  141*  --   --   --  151  --  206    < > = values in this interval not displayed.               VTE Prophylaxis: Venice Vallecillo MD  Cardiology Fellow   FY-1      [employer]/ [employer]/Care Everywhere records reviewed: y    ** Please Note: Fluency DirectDictation voice to text software may have been used in the creation of this document. **

## 2024-02-18 NOTE — PHYSICAL THERAPY NOTE
Physical Therapy Re-Evaluation     Patient's Name: Rohan Don    Admitting Diagnosis  CHF (congestive heart failure) ([facility]) [I50.9]  SOB (shortness of breath) [R06.02]    Problem List  Patient Active Problem List   Diagnosis    Acute systolic congestive heart failure (HCC)    Prediabetes    DEISI (acute kidney injury) ([facility])    Elevated transaminase level    Vitamin D deficiency    HTN (hypertension)    Pleural effusion    Severe protein-calorie malnutrition (HCC)    Coronary artery disease    Nodule of upper lobe of right lung    Renal calculi    S/P CABG x 2    S/P MVR (mitral valve repair)    Ischemic cardiomyopathy    Severe left ventricular systolic dysfunction       Past Medical History  History reviewed. No pertinent past medical history.    Past Surgical History  Past Surgical History:   Procedure Laterality Date    CARDIAC CATHETERIZATION N/A 2/12/2024    Procedure: Cardiac catheterization;  Surgeon: Shanon Pal DO;  [facility];  Service: Cardiology    CARDIAC CATHETERIZATION Left 2/12/2024    Procedure: Cardiac Left Heart Cath;  Surgeon: Shanon Pal DO;  [facility];  Service: Cardiology    CARDIAC CATHETERIZATION N/A 2/12/2024    Procedure: Cardiac Coronary Angiogram;  Surgeon: Shanon Pal DO;  [facility];  Service: Cardiology    [place] CORONARY ARTERY BYP W/VEIN & ARTERY GRAFT 2 VEIN N/A 2/15/2024    Procedure: CORONARY ARTERY BYPASS GRAFT (CABG) X 2 VESSELS, SVG --> RCA, LIMA to LAD. MITRAL REPAIR WITH  32mm PHYSIO II MITRAL RING;  Surgeon: AGNES Shaw MD;  [facility];  Service: Cardiac Surgery    NV INSERTION INTRA-AORTIC BALLOON ASSIST DEV PERQ Right 2/15/2024    Procedure: INSERTION INTRA BALLOON PUMP AORTIC (IABP);  Surgeon: AGNES Shaw MD;  [facility];  Service: Cardiac Surgery          02/18/24 0917   PT Last Visit   PT Visit Date 02/18/24   Note Type   Note type Re-Evaluation    Pain Assessment   Pain Assessment Tool 0-10   Pain Score 4   Pain Location/Orientation Orientation: Mid;Location: Chest;Location: Incision   Pain Onset/Description Onset: Ongoing;Frequency: Intermittent;Descriptor: Aching;Descriptor: Discomfort   Effect of Pain on Daily Activities guarding   Patient's Stated Pain Goal No pain   Hospital Pain Intervention(s) Repositioned;Ambulation/increased activity;Emotional support   Restrictions/Precautions   Other Precautions Cardiac/sternal;Multiple lines;Telemetry  ([place] catheter, a-line, CT)   Prior Function   Level of [place] Independent with functional mobility  (amb w/o AD)   Comments please refer to the initial eval for details of SHx/home setup   General   Additional Pertinent History Pt underwent Intra-aortic balloon pump placement, Coronary artery bypass grafting x2 with LIMA to LAD and SVG to RCA; 3 and Mitral valve repair with 32 mm Herman Physio II ring annuloplasty on 2/15/2024; PT is reconsulted; pt is cleared for assessment by cecily   Cognition   Overall Cognitive Status WFL   Arousal/Participation Alert   Attention Within functional limits   Orientation Level Oriented to person;Oriented to place;Oriented to situation   Memory Decreased recall of precautions   Following Commands Follows one step commands without difficulty   Subjective   Subjective Alert; in the chair; agreeable to mobilize   RUE Assessment   RUE Assessment WFL  (AROM)   LUE Assessment   LUE Assessment WFL  (AROM)   RLE Assessment   RLE Assessment WFL  (AROM)   Strength RLE   RLE Overall Strength   (good -)   LLE Assessment   LLE Assessment WFL  (AROM)   Strength LLE   LLE Overall Strength   (good -)   Transfers   Sit to Stand 4  Minimal assistance   Additional items Assist x 1;Verbal cues   Stand to Sit 4  Minimal assistance   Additional items Assist x 1;Verbal cues   Ambulation/Elevation   Gait pattern Short stride;Inconsistent chago   Gait Assistance 4  Minimal assist   Additional  items Assist x 1;Verbal cues;Tactile cues   Assistive Device Rolling walker   Distance 30 ft total distance   Balance   Static Sitting Fair   Dynamic Sitting Fair -   Static Standing Fair -   Dynamic Standing Poor +   Ambulatory Poor +   Activity Tolerance   Activity Tolerance Patient tolerated treatment well   Medical Staff Made Aware Co-reeval performed w/ OTR due to complexity of medical status; spoke to VINICIUS Cummins w/ CT sx   Nurse Made Aware spoke to SNEHA Bates   Assessment   Prognosis Good   Problem List Decreased strength;Decreased endurance;Impaired balance;Decreased mobility   Assessment Functional mobility re-assessment performed; pt presents s/p CABG, MV repair, and IABP and currently exhibits min post op discomfort and guarding and overall weakness and deconditioning; min (A) was needed for transfers and amb trial w/ rw; pt remained in NAD w/ BP stable; will cont to follow pt in PT for progressive mobilization to max functional (I), endurance and safety. Otherwise, anticipate pt will return home w/ available family support upon D/C provided he cont improving w/ mobility skills, safety, and endurance and when medically cleared; `   Goals   Patient Goals to get better   STG Expiration Date 02/28/24   Short Term Goal #1 7-10 days. Pt will amb 300 ft w/ least restrictive assistive device PRN, mod (I) in order to facilitate safe return to premorbid environment and community amb status. Pt will negotiate 4 steps w/ hand rail (if available at home) and w/ SPC PRN, mod (I) in order to navigate in and out of home environment safely. Pt will achieve (I) level w/ bed mob in order to facilitate safety with OOB and back to bed transitions in own living environment. Pt will perform transfers w/ mod (I) to assure (I) and safety w/ functional mobility/transitions w/ all aspects of mobility/locomotion.  Pt will participate in LE therex and balance activities to max progression w/ mobility skills.   PT Treatment Day 0    Plan   Treatment/Interventions Functional transfer training;LE strengthening/ROM;Elevations;Therapeutic exercise;Endurance training;Equipment eval/education;Bed mobility;Gait training;Spoke to nursing;Spoke to advanced practitioner;OT   PT Frequency 4-6x/wk   Discharge Recommendation   Rehab Resource Intensity Level, PT III (Minimum Resource Intensity)   Equipment Recommended Walker  (at this time)   AM-PAC Basic Mobility Inpatient   Turning in Flat Bed Without Bedrails 3   Lying on Back to Sitting on Edge of Flat Bed Without Bedrails 2   Moving Bed to Chair 3   Standing Up From Chair Using Arms 3   Walk in Room 3   Climb 3-5 Stairs With Railing 2   Basic Mobility Inpatient Raw Score 16   Basic Mobility Standardized Score 38.32   Highest Level Of Mobility   JH-HLM Goal 5: Stand one or more mins   JH-HLM Achieved 7: Walk 25 feet or more   Modified [place] Scale   Modified [place] Scale 4   End of Consult   Patient Position at End of Consult Bedside chair;All needs within reach         Kwesi Germain, PT

## 2024-02-18 NOTE — PROGRESS NOTES
Progress Note - Cardiothoracic Surgery   Rohan Don 63 y.o. male MRN: [id number]  Unit/Bed#: [facility] 416-01 Encounter: [id number]      POD # 3 s/p CABG, MV repair, IABP    Pt seen/examined.  Interval history and data reviewed with critical care team.    IABP out  Milrinone 0.125  CI > 2.2    Medications:   Scheduled Meds:  Current Facility-Administered Medications   Medication Dose Route Frequency Provider Last Rate    acetaminophen  650 mg Oral Q6H While awake Judi Francisca, PA-C      amiodarone  200 mg Oral Q8H JENN Judi Francisca, PA-C      aspirin  325 mg Oral Daily Judi Francisca, PA-C      atorvastatin  80 mg Oral Daily With Dinner JudiKEVIN Almaraz-C      bisacodyl  10 mg Rectal Daily PRN Judi Francisca, PA-C      chlorhexidine  15 mL Mouth/Throat BID Judi Francisca, PA-C      docusate sodium  100 mg Oral BID OSCAR Renteria      [START ON 2/19/2024] furosemide  40 mg Intravenous Daily OSCAR Renteria      heparin (porcine)  5,000 Units Subcutaneous Q8H JENN Judimorenita Espinosa PA-C      insulin lispro  1-5 Units Subcutaneous TID AC OSCAR Renteria      insulin lispro  1-5 Units Subcutaneous HS OSCAR Renteria      melatonin  3 mg Oral HS Judi Espinosa PA-C      methocarbamol  500 mg Oral Q6H JENN OSCAR Renteria      metoprolol tartrate  12.5 mg Oral Q12H [facility] OSCAR Renteria      milrinone (Primacor) infusion  0.13 mcg/kg/min Intravenous Continuous OSCAR Renteria Stopped (02/18/24 0751)    mupirocin  1 Application Nasal Q12H JENN Judimorenita Espinosa PA-C      niCARdipine  2.5-15 mg/hr Intravenous Titrated Judi Francisca, PA-C 2.5 mg/hr (02/18/24 1014)    ondansetron  4 mg Intravenous Q6H PRN Judi Francisca, PA-C      oxyCODONE  2.5 mg Oral Q4H PRN Judi Francisca, PA-C      Or    oxyCODONE  5 mg Oral Q4H PRN Judi Francisca, PA-C      pantoprazole  40 mg Oral Daily Judi Francisca, PA-C      polyethylene glycol  17 g Oral Daily Judi Francisca, PA-C      potassium  "chloride  20 mEq Oral BID OSCAR Renteria      temazepam  15 mg Oral HS PRN OSCAR Renteria       Continuous Infusions:milrinone (Primacor) infusion, 0.13 mcg/kg/min, Last Rate: Stopped (02/18/24 0751)  niCARdipine, 2.5-15 mg/hr, Last Rate: 2.5 mg/hr (02/18/24 1014)      PRN Meds:.  bisacodyl    ondansetron    oxyCODONE **OR** oxyCODONE    temazepam    Vitals: Blood pressure 101/58, pulse 75, temperature (!) 97.4 °F (36.3 °C), resp. rate (!) 28, height 5' 11\" (1.803 m), weight 57.4 kg (126 lb 8.7 oz), SpO2 95%.,Body mass index is 17.65 kg/m².  I/O last 24 hours:  In: 1789.2 [P.O.:480; I.V.:449.2; Blood:260; IV Piggyback:600]  Out: 3625 [Urine:3475; Chest Tube:150]  Invasive Devices       Central Venous Catheter Line  Duration             CVC Central Lines 02/15/24 3 days    Introducer 02/15/24 3 days              Peripheral Intravenous Line  Duration             Peripheral IV 02/15/24 Left Arm 3 days    Peripheral IV 02/15/24 Left;Ventral (anterior) Hand 3 days              Arterial Line  Duration             Arterial Line 02/15/24 Right Brachial 3 days              Line  Duration             Pacer Wires 2 days    Pacer Wires 2 days              Drain  Duration             Chest Tube 1 Left Pleural 32 Fr. 3 days    Chest Tube 2 Mediastinal;Posterior 32 Fr. 3 days    Urethral Catheter Non-latex;Straight-tip 16 Fr. 3 days    Chest Tube 3 Mediastinal;Anterior 32 Fr. 2 days                      Lab, Imaging and other studies:   Results from last 7 days   Lab Units 02/18/24  0406 02/17/24  0357 02/16/24  1226 02/16/24  0422 02/15/24  1353 02/15/24  1348   WBC Thousand/uL 15.02* 19.60*  --  14.71*  --   --    HEMOGLOBIN g/dL 11.6* 12.1 12.4 12.8   < > 12.2   I STAT HEMOGLOBIN   --   --   --   --    < >  --    HEMATOCRIT % 36.2* 39.2  --  38.9   < > 39.4   HEMATOCRIT, ISTAT   --   --   --   --    < >  --    PLATELETS Thousands/uL 141*  --   --  141*  --  151    < > = values in this interval not " displayed.     Results from last 7 days   Lab Units 02/18/24  0406 02/17/24  1727 02/17/24  0357 02/15/24  1808 02/15/24  1353   POTASSIUM mmol/L 3.8 4.0 4.0   < >  --    CHLORIDE mmol/L 97 96 104   < >  --    CO2 mmol/L 33* 31 26   < >  --    CO2, I-STAT mmol/L  --   --   --   --  21   BUN mg/dL 27* 24 29*   < >  --    CREATININE mg/dL 0.78 0.89 1.19   < >  --    GLUCOSE, ISTAT mg/dl  --   --   --   --  100   CALCIUM mg/dL 8.5 8.8 8.5   < >  --     < > = values in this interval not displayed.     Results from last 7 days   Lab Units 02/15/24  1348   INR  1.45*   PTT seconds 30     Recent Labs     02/16/24  0425   PHART 7.457*   EOU4KZC 22.1   PO2ART 177.5*   NMF4VWM 32.0*   BEART -0.9           Plan:    DC. De/Sofia/Dyana/Susan.  DC chest tubes, pacing wires.  Incentive spirometry.  Diuresis.  PO ASA/Statin  Start Metoprolol    SIGNATURE: Gerber Estrella MD  DATE: February 18, 2024  TIME: 11:02 AM

## 2024-02-18 NOTE — PLAN OF CARE
Problem: CARDIOVASCULAR - ADULT  Goal: Absence of cardiac dysrhythmias or at baseline rhythm  Description: INTERVENTIONS:  - Continuous cardiac monitoring, vital signs, obtain 12 lead EKG if ordered  - Administer antiarrhythmic and heart rate control medications as ordered  - Monitor electrolytes and administer replacement therapy as ordered  Outcome: Progressing

## 2024-02-19 LAB
ANION GAP SERPL CALCULATED.3IONS-SCNC: 6 MMOL/L
BASE EX.OXY STD BLDV CALC-SCNC: 60.5 % (ref 60–80)
BASE EXCESS BLDV CALC-SCNC: 2.9 MMOL/L
BUN SERPL-MCNC: 27 MG/DL (ref 5–25)
CA-I BLD-SCNC: 1.15 MMOL/L (ref 1.12–1.32)
CALCIUM SERPL-MCNC: 8.4 MG/DL (ref 8.4–10.2)
CHLORIDE SERPL-SCNC: 103 MMOL/L (ref 96–108)
CO2 SERPL-SCNC: 26 MMOL/L (ref 21–32)
CREAT SERPL-MCNC: 0.66 MG/DL (ref 0.6–1.3)
ERYTHROCYTE [DISTWIDTH] IN BLOOD BY AUTOMATED COUNT: 13.2 % (ref 11.6–15.1)
GFR SERPL CREATININE-BSD FRML MDRD: 103 ML/MIN/1.73SQ M
GLUCOSE SERPL-MCNC: 112 MG/DL (ref 65–140)
GLUCOSE SERPL-MCNC: 114 MG/DL (ref 65–140)
GLUCOSE SERPL-MCNC: 116 MG/DL (ref 65–140)
GLUCOSE SERPL-MCNC: 117 MG/DL (ref 65–140)
GLUCOSE SERPL-MCNC: 96 MG/DL (ref 65–140)
HCO3 BLDV-SCNC: 28.1 MMOL/L (ref 24–30)
HCT VFR BLD AUTO: 32.3 % (ref 36.5–49.3)
HGB BLD-MCNC: 10.1 G/DL (ref 12–17)
MAGNESIUM SERPL-MCNC: 2.2 MG/DL (ref 1.9–2.7)
MCH RBC QN AUTO: 29.2 PG (ref 26.8–34.3)
MCHC RBC AUTO-ENTMCNC: 31.3 G/DL (ref 31.4–37.4)
MCV RBC AUTO: 93 FL (ref 82–98)
O2 CT BLDV-SCNC: 9.5 ML/DL
PCO2 BLDV: 45.7 MM HG (ref 42–50)
PH BLDV: 7.41 [PH] (ref 7.3–7.4)
PHOSPHATE SERPL-MCNC: 2.1 MG/DL (ref 2.3–4.1)
PLATELET # BLD AUTO: 190 THOUSANDS/UL (ref 149–390)
PMV BLD AUTO: 9 FL (ref 8.9–12.7)
PO2 BLDV: 33.1 MM HG (ref 35–45)
POTASSIUM SERPL-SCNC: 3.9 MMOL/L (ref 3.5–5.3)
RBC # BLD AUTO: 3.46 MILLION/UL (ref 3.88–5.62)
SODIUM SERPL-SCNC: 135 MMOL/L (ref 135–147)
WBC # BLD AUTO: 10.5 THOUSAND/UL (ref 4.31–10.16)

## 2024-02-19 PROCEDURE — 84100 ASSAY OF PHOSPHORUS: CPT | Performed by: NURSE PRACTITIONER

## 2024-02-19 PROCEDURE — 99232 SBSQ HOSP IP/OBS MODERATE 35: CPT | Performed by: INTERNAL MEDICINE

## 2024-02-19 PROCEDURE — 99233 SBSQ HOSP IP/OBS HIGH 50: CPT | Performed by: ANESTHESIOLOGY

## 2024-02-19 PROCEDURE — 80048 BASIC METABOLIC PNL TOTAL CA: CPT | Performed by: NURSE PRACTITIONER

## 2024-02-19 PROCEDURE — 83735 ASSAY OF MAGNESIUM: CPT | Performed by: NURSE PRACTITIONER

## 2024-02-19 PROCEDURE — 97116 GAIT TRAINING THERAPY: CPT

## 2024-02-19 PROCEDURE — 82330 ASSAY OF CALCIUM: CPT | Performed by: NURSE PRACTITIONER

## 2024-02-19 PROCEDURE — 99024 POSTOP FOLLOW-UP VISIT: CPT | Performed by: THORACIC SURGERY (CARDIOTHORACIC VASCULAR SURGERY)

## 2024-02-19 PROCEDURE — 82805 BLOOD GASES W/O2 SATURATION: CPT | Performed by: ANESTHESIOLOGY

## 2024-02-19 PROCEDURE — 82948 REAGENT STRIP/BLOOD GLUCOSE: CPT

## 2024-02-19 PROCEDURE — 85027 COMPLETE CBC AUTOMATED: CPT | Performed by: NURSE PRACTITIONER

## 2024-02-19 PROCEDURE — 97535 SELF CARE MNGMENT TRAINING: CPT

## 2024-02-19 PROCEDURE — 97112 NEUROMUSCULAR REEDUCATION: CPT

## 2024-02-19 PROCEDURE — 97530 THERAPEUTIC ACTIVITIES: CPT

## 2024-02-19 RX ORDER — POTASSIUM CHLORIDE 14.9 MG/ML
20 INJECTION INTRAVENOUS ONCE
Status: COMPLETED | OUTPATIENT
Start: 2024-02-19 | End: 2024-02-19

## 2024-02-19 RX ADMIN — METHOCARBAMOL 500 MG: 500 TABLET ORAL at 11:23

## 2024-02-19 RX ADMIN — CHLORHEXIDINE GLUCONATE 15 ML: 1.2 SOLUTION ORAL at 08:01

## 2024-02-19 RX ADMIN — METOPROLOL TARTRATE 25 MG: 25 TABLET, FILM COATED ORAL at 21:02

## 2024-02-19 RX ADMIN — POTASSIUM CHLORIDE 20 MEQ: 1500 TABLET, EXTENDED RELEASE ORAL at 08:02

## 2024-02-19 RX ADMIN — METHOCARBAMOL 500 MG: 500 TABLET ORAL at 05:09

## 2024-02-19 RX ADMIN — FUROSEMIDE 40 MG: 10 INJECTION, SOLUTION INTRAMUSCULAR; INTRAVENOUS at 08:01

## 2024-02-19 RX ADMIN — METHOCARBAMOL 500 MG: 500 TABLET ORAL at 17:47

## 2024-02-19 RX ADMIN — POTASSIUM CHLORIDE 20 MEQ: 14.9 INJECTION, SOLUTION INTRAVENOUS at 05:14

## 2024-02-19 RX ADMIN — ASPIRIN 325 MG ORAL TABLET 325 MG: 325 PILL ORAL at 08:01

## 2024-02-19 RX ADMIN — PANTOPRAZOLE SODIUM 40 MG: 40 TABLET, DELAYED RELEASE ORAL at 08:02

## 2024-02-19 RX ADMIN — AMIODARONE HYDROCHLORIDE 200 MG: 200 TABLET ORAL at 13:39

## 2024-02-19 RX ADMIN — NICARDIPINE HYDROCHLORIDE 2.5 MG/HR: 2.5 INJECTION, SOLUTION INTRAVENOUS at 00:01

## 2024-02-19 RX ADMIN — DOCUSATE SODIUM 100 MG: 100 CAPSULE, LIQUID FILLED ORAL at 17:47

## 2024-02-19 RX ADMIN — POTASSIUM PHOSPHATE, MONOBASIC POTASSIUM PHOSPHATE, DIBASIC 21 MMOL: 224; 236 INJECTION, SOLUTION, CONCENTRATE INTRAVENOUS at 06:52

## 2024-02-19 RX ADMIN — DOCUSATE SODIUM 100 MG: 100 CAPSULE, LIQUID FILLED ORAL at 08:02

## 2024-02-19 RX ADMIN — ACETAMINOPHEN 650 MG: 325 TABLET, FILM COATED ORAL at 13:39

## 2024-02-19 RX ADMIN — AMIODARONE HYDROCHLORIDE 200 MG: 200 TABLET ORAL at 21:02

## 2024-02-19 RX ADMIN — ACETAMINOPHEN 650 MG: 325 TABLET, FILM COATED ORAL at 21:02

## 2024-02-19 RX ADMIN — HEPARIN SODIUM 5000 UNITS: 5000 INJECTION INTRAVENOUS; SUBCUTANEOUS at 13:39

## 2024-02-19 RX ADMIN — MELATONIN 3 MG: at 21:02

## 2024-02-19 RX ADMIN — AMIODARONE HYDROCHLORIDE 200 MG: 200 TABLET ORAL at 05:09

## 2024-02-19 RX ADMIN — ATORVASTATIN CALCIUM 80 MG: 80 TABLET, FILM COATED ORAL at 17:47

## 2024-02-19 RX ADMIN — ACETAMINOPHEN 650 MG: 325 TABLET, FILM COATED ORAL at 08:01

## 2024-02-19 RX ADMIN — MUPIROCIN 1 APPLICATION: 20 OINTMENT TOPICAL at 21:02

## 2024-02-19 RX ADMIN — HEPARIN SODIUM 5000 UNITS: 5000 INJECTION INTRAVENOUS; SUBCUTANEOUS at 05:10

## 2024-02-19 RX ADMIN — Medication 12.5 MG: at 08:01

## 2024-02-19 RX ADMIN — Medication 2.5 MG: at 21:04

## 2024-02-19 RX ADMIN — MUPIROCIN 1 APPLICATION: 20 OINTMENT TOPICAL at 08:02

## 2024-02-19 RX ADMIN — HEPARIN SODIUM 5000 UNITS: 5000 INJECTION INTRAVENOUS; SUBCUTANEOUS at 21:02

## 2024-02-19 NOTE — PROGRESS NOTES
"  HF- Progress Note   Rohan Don 63 y.o. male MRN: [id number]  Unit/Bed#: [facility] 412-01 Encounter: [id number]            Assessment:  Rohan Don is a 63 y.o. year old male who presented on 2 9 with shortness of breath and worsening leg swelling for 2 weeks.  Patient was found to have HFrEF, eventually underwent ischemic workup showing multivessel disease s/p CABG x 2 and MVR(12/15/2024).  Today is POD #4.    HFrEF. NYHA Class III/ACC Stage C. Ischemic cardiomyopathy.  LVEF of 20%.  Patient is off inotropes and is doing well.  Beta-Blocker: Started on Lopressor 25 mg twice daily  ACEi, ARB or ARNi: None  Aldosterone Receptor Blocker: None  Diuretic: IV Lasix 40 mg twice daily    Sudden Cardiac Death Risk Reduction:  --ICD: Pending updated TTE    Electrical Resynchronization:  --QRS: 84       MvCAD s/p CABG x2 and MV Annuloplasty ( SVG to RCA and LIMA to LAD).   Prediabetes. A1c 6.1  Hypertension      Plan:  Examines euvolemic can likely transition to p.o. Lasix starting tomorrow.  Continue aspirin and statin  Agree with starting p.o. beta-blocker  Continue amiodarone  Patient does not have insurance and will likely not be able to afford Jardiance or Entresto at this time  If patient's blood pressure remained stable through tomorrow can consider starting patient on an ARB  Can obtain TTE to reevaluate EF, but patient is off of inotropes, if EF is still low patient will need to be considered for LifeVest      Subjective:     No acute overnight events.  No complaints at this time.    Telemetry: Patient had a 16 beat run of VT overnight        Vitals: /68   Pulse 80   Temp 99.6 °F (37.6 °C) (Core)   Resp (!) 39   Ht 5' 11\" (1.803 m)   Wt 58.9 kg (129 lb 13.6 oz)   SpO2 96%   BMI 18.11 kg/m²       Intake/Output Summary (Last 24 hours) at 2/19/2024 0954  Last data filed at 2/19/2024 0900  Gross per 24 hour   Intake 1627.5 ml   Output 1925 ml   Net -297.5 ml       24 Hours VS:   Temp:  [97.2 °F (36.2 " °C)-99.6 °F (37.6 °C)] 99.6 °F (37.6 °C)  HR:  [71-82] 80  Resp:  [9-53] 39  BP: ()/(55-81) 121/68  SpO2:  [91 %-96 %] 96 %     Review of System:  Review of system was conducted and was negative except for as stated in the subjective.    Physical Exam:  Physical Exam  Constitutional:       Appearance: He is not ill-appearing.   Cardiovascular:      Rate and Rhythm: Normal rate and regular rhythm.   Pulmonary:      Breath sounds: No decreased breath sounds or wheezing.   Musculoskeletal:      Right lower leg: No edema.      Left lower leg: No edema.   Skin:     General: Skin is warm.   Neurological:      General: No focal deficit present.      Mental Status: He is alert and oriented to person, place, and time.          Inpatient medications:   Scheduled Meds:  Current Facility-Administered Medications   Medication Dose Route Frequency Provider Last Rate    acetaminophen  650 mg Oral Q6H While awake [facility], CRNP      amiodarone  200 mg Oral Q8H [facility], CRNP      aspirin  325 mg Oral Daily [facility], CRNP      atorvastatin  80 mg Oral Daily With Dinner [facility], CRNP      bisacodyl  10 mg Rectal Daily PRN [facility], CRNP      docusate sodium  100 mg Oral BID [facility], CRNP      furosemide  40 mg Intravenous Daily [facility], CRNP      heparin (porcine)  5,000 Units Subcutaneous Q8H [facility], CRNP      insulin lispro  1-5 Units Subcutaneous TID AC [facility], CRNP      insulin lispro  1-5 Units Subcutaneous HS [facility], CRNP      melatonin  3 mg Oral HS [facility], CRNP      methocarbamol  500 mg Oral Q6H [facility], CRNP      metoprolol tartrate  25 mg Oral Q12H [facility], CRNP      mupirocin  1 Application Nasal Q12H [facility], CRNP      ondansetron  4 mg Intravenous  Q6H PRN [facility], CRNP      oxyCODONE  2.5 mg Oral Q4H PRN [facility], CRNP      Or    oxyCODONE  5 mg Oral Q4H PRN [facility], CRNP      pantoprazole  40 mg Oral Daily [facility], CRNP      polyethylene glycol  17 g Oral Daily [facility], CRNP      potassium chloride  20 mEq Oral Daily [facility], CRNP      temazepam  15 mg Oral HS PRN [facility], CRNP       Continuous Infusions:     PRN Meds:.  bisacodyl    ondansetron    oxyCODONE **OR** oxyCODONE    temazepam     Labs & Results:  Results from last 7 days   Lab Units 02/19/24  0428 02/18/24  0406 02/17/24  1727   POTASSIUM mmol/L 3.9 3.8 4.0   CO2 mmol/L 26 33* 31   CHLORIDE mmol/L 103 97 96   BUN mg/dL 27* 27* 24   CREATININE mg/dL 0.66 0.78 0.89     Results from last 7 days   Lab Units 02/19/24  0411 02/18/24  0406 02/17/24  0357 02/16/24  1226 02/16/24  0422   HEMOGLOBIN g/dL 10.1* 11.6* 12.1   < > 12.8   HEMATOCRIT % 32.3* 36.2* 39.2  --  38.9   PLATELETS Thousands/uL 190 141*  --   --  141*    < > = values in this interval not displayed.               VTE Prophylaxis: Heparin            Rafael Vallecillo MD  Cardiology Fellow   FY-1      [facility]/ [employer]/Care Everywhere records reviewed: y    ** Please Note: Fluency DirectDictation voice to text software may have been used in the creation of this document. **

## 2024-02-19 NOTE — RESTORATIVE TECHNICIAN NOTE
Restorative Technician Note      Patient Name: Rohan Don     Restorative Tech Visit Date: 02/19/24  Note Type: Mobility  Patient Position Upon Consult: Bedside chair  Activity Performed: Ambulated  Assistive Device: Roller walker  Patient Position at End of Consult: All needs within reach; Bedside chair

## 2024-02-19 NOTE — PROGRESS NOTES
Progress Note - Cardiothoracic Surgery   Rohan Don 63 y.o. male MRN: [id number]  Unit/Bed#: [facility] 416-01 Encounter: [id number]      POD # 4 s/p CABG, MV repair, IABP    Pt seen/examined.  Interval history and data reviewed with critical care team.      Medications:   Scheduled Meds:  Current Facility-Administered Medications   Medication Dose Route Frequency Provider Last Rate    acetaminophen  650 mg Oral Q6H While awake KEVIN Hurley-C      amiodarone  200 mg Oral Q8H JENN Judi Francisca, PA-C      aspirin  325 mg Oral Daily Judi Francisca, PA-C      atorvastatin  80 mg Oral Daily With Dinner Judi Espinosa PA-C      bisacodyl  10 mg Rectal Daily PRN Judi Francisca, PA-C      chlorhexidine  15 mL Mouth/Throat BID Judi Francisca, PA-C      docusate sodium  100 mg Oral BID OSCAR Renteria      furosemide  40 mg Intravenous Daily OSCAR Renteria      heparin (porcine)  5,000 Units Subcutaneous Q8H JENN Judimorenita Espinosa PA-C      insulin lispro  1-5 Units Subcutaneous TID AC OSCAR Renteria      insulin lispro  1-5 Units Subcutaneous HS OSCAR Renteria      melatonin  3 mg Oral HS Judi Espionsa PA-C      methocarbamol  500 mg Oral Q6H JENN OSCAR Renteria      metoprolol tartrate  12.5 mg Oral Q12H [facility] OSCAR Renteria      mupirocin  1 Application Nasal Q12H JENN Judi VINICIUS Espinosa      ondansetron  4 mg Intravenous Q6H PRN Judi Francisca, PA-C      oxyCODONE  2.5 mg Oral Q4H PRN Judi FranciscaVINICIUS marrero      Or    oxyCODONE  5 mg Oral Q4H PRN Judi Francisca, PAEDGARDO      pantoprazole  40 mg Oral Daily Judi Francisca, PA-C      polyethylene glycol  17 g Oral Daily Judi Francisca, PA-C      potassium chloride  20 mEq Oral Daily OSCAR Renteria      temazepam  15 mg Oral HS PRN OSCAR Renteria       Continuous Infusions:     PRN Meds:.  bisacodyl    ondansetron    oxyCODONE **OR** oxyCODONE    temazepam    Vitals: Blood pressure 136/76, pulse 81, temperature  "98.8 °F (37.1 °C), resp. rate (!) 24, height 5' 11\" (1.803 m), weight 58.9 kg (129 lb 13.6 oz), SpO2 95%.,Body mass index is 18.11 kg/m².  I/O last 24 hours:  In: 1151.6 [I.V.:301.6; IV Piggyback:850]  Out: 1300 [Urine:1300]  Invasive Devices       Central Venous Catheter Line  Duration             CVC Central Lines 02/15/24 3 days    Introducer 02/15/24 3 days                      Lab, Imaging and other studies:   Results from last 7 days   Lab Units 02/19/24  0411 02/18/24  0406 02/17/24  0357 02/16/24  1226 02/16/24  0422   WBC Thousand/uL 10.50* 15.02* 19.60*  --  14.71*   HEMOGLOBIN g/dL 10.1* 11.6* 12.1   < > 12.8   HEMATOCRIT % 32.3* 36.2* 39.2  --  38.9   PLATELETS Thousands/uL 190 141*  --   --  141*    < > = values in this interval not displayed.     Results from last 7 days   Lab Units 02/19/24  0428 02/18/24  0406 02/17/24  1727 02/15/24  1808 02/15/24  1353   POTASSIUM mmol/L 3.9 3.8 4.0   < >  --    CHLORIDE mmol/L 103 97 96   < >  --    CO2 mmol/L 26 33* 31   < >  --    CO2, I-STAT mmol/L  --   --   --   --  21   BUN mg/dL 27* 27* 24   < >  --    CREATININE mg/dL 0.66 0.78 0.89   < >  --    GLUCOSE, ISTAT mg/dl  --   --   --   --  100   CALCIUM mg/dL 8.4 8.5 8.8   < >  --     < > = values in this interval not displayed.     Results from last 7 days   Lab Units 02/15/24  1348   INR  1.45*   PTT seconds 30     No results for input(s): \"PHART\", \"EQH3CVV\", \"PO2ART\", \"WBU3KMZ\", \"X3NMXGBZ\", \"BEART\" in the last 72 hours.          Plan:    Needs to work on nutrition.    DC. [place].  Incentive spirometry.  Diuresis.  PO ASA/Statin/BB.  Transfer to floor.      SIGNATURE: AGNES Shaw MD  DATE: February 19, 2024  TIME: 7:36 AM    "

## 2024-02-19 NOTE — PLAN OF CARE
Problem: PAIN - ADULT  Goal: Verbalizes/displays adequate comfort level or baseline comfort level  Description: Interventions:  - Encourage patient to monitor pain and request assistance  - Assess pain using appropriate pain scale  - Administer analgesics based on type and severity of pain and evaluate response  - Implement non-pharmacological measures as appropriate and evaluate response  - Consider cultural and social influences on pain and pain management  - Notify physician/advanced practitioner if interventions unsuccessful or patient reports new pain  Outcome: Progressing     Problem: INFECTION - ADULT  Goal: Absence or prevention of progression during hospitalization  Description: INTERVENTIONS:  - Assess and monitor for signs and symptoms of infection  - Monitor lab/diagnostic results  - Monitor all insertion sites, i.e. indwelling lines, tubes, and drains  - Monitor endotracheal if appropriate and nasal secretions for changes in amount and color  - [place] appropriate cooling/warming therapies per order  - Administer medications as ordered  - Instruct and encourage patient and family to use good hand hygiene technique  - Identify and instruct in appropriate isolation precautions for identified infection/condition  Outcome: Progressing  Goal: Absence of fever/infection during neutropenic period  Description: INTERVENTIONS:  - Monitor WBC    Outcome: Progressing     Problem: SAFETY ADULT  Goal: Patient will remain free of falls  Description: INTERVENTIONS:  - Educate patient/family on patient safety including physical limitations  - Instruct patient to call for assistance with activity   - Consult OT/PT to assist with strengthening/mobility   - Keep Call bell within reach  - Keep bed low and locked with side rails adjusted as appropriate  - Keep care items and personal belongings within reach  - Initiate and maintain comfort rounds  - Make Fall Risk Sign visible to staff  - Apply yellow socks and bracelet  for high fall risk patients  - Consider moving patient to room near nurses station  Outcome: Progressing  Goal: Maintain or return to baseline ADL function  Description: INTERVENTIONS:  -  Assess patient's ability to carry out ADLs; assess patient's baseline for ADL function and identify physical deficits which impact ability to perform ADLs (bathing, care of mouth/teeth, toileting, grooming, dressing, etc.)  - Assess/evaluate cause of self-care deficits   - Assess range of motion  - Assess patient's mobility; develop plan if impaired  - Assess patient's need for assistive devices and provide as appropriate  - Encourage maximum independence but intervene and supervise when necessary  - Involve family in performance of ADLs  - Assess for home care needs following discharge   - Consider OT consult to assist with ADL evaluation and planning for discharge  - Provide patient education as appropriate  Outcome: Progressing  Goal: Maintains/Returns to pre admission functional level  Description: INTERVENTIONS:  - Perform AM-PAC 6 Click Basic Mobility/ Daily Activity assessment daily.  - Set and communicate daily mobility goal to care team and patient/family/caregiver.   - Collaborate with rehabilitation services on mobility goals if consulted  - Out of bed for toileting  - Record patient progress and toleration of activity level   Outcome: Progressing     Problem: DISCHARGE PLANNING  Goal: Discharge to home or other facility with appropriate resources  Description: INTERVENTIONS:  - Identify barriers to discharge w/patient and caregiver  - Arrange for needed discharge resources and transportation as appropriate  - Identify discharge learning needs (meds, wound care, etc.)  - Arrange for interpretive services to assist at discharge as needed  - Refer to Case Management Department for coordinating discharge planning if the patient needs post-hospital services based on physician/advanced practitioner order or complex needs  related to functional status, cognitive ability, or social support system  Outcome: Progressing     Problem: Knowledge Deficit  Goal: Patient/family/caregiver demonstrates understanding of disease process, treatment plan, medications, and discharge instructions  Description: Complete learning assessment and assess knowledge base.  Interventions:  - Provide teaching at level of understanding  - Provide teaching via preferred learning methods  Outcome: Progressing     Problem: Nutrition/Hydration-ADULT  Goal: Nutrient/Hydration intake appropriate for improving, restoring or maintaining nutritional needs  Description: Monitor and assess patient's nutrition/hydration status for malnutrition. Collaborate with interdisciplinary team and initiate plan and interventions as ordered.  Monitor patient's weight and dietary intake as ordered or per policy. Utilize nutrition screening tool and intervene as necessary. Determine patient's food preferences and provide high-protein, high-caloric foods as appropriate.     INTERVENTIONS:  - Monitor oral intake, urinary output, labs, and treatment plans  - Assess nutrition and hydration status and recommend course of action  - Evaluate amount of meals eaten  - Assist patient with eating if necessary   - Allow adequate time for meals  - Recommend/ encourage appropriate diets, oral nutritional supplements, and vitamin/mineral supplements  - Order, calculate, and assess calorie counts as needed  - Recommend, monitor, and adjust tube feedings and TPN/PPN based on assessed needs  - Assess need for intravenous fluids  - Provide specific nutrition/hydration education as appropriate  - Include patient/family/caregiver in decisions related to nutrition  Outcome: Progressing     Problem: Prexisting or High Potential for Compromised Skin Integrity  Goal: Skin integrity is maintained or improved  Description: INTERVENTIONS:  - Identify patients at risk for skin breakdown  - Assess and monitor skin  integrity  - Assess and monitor nutrition and hydration status  - Monitor labs   - Assess for incontinence   - Turn and reposition patient  - Assist with mobility/ambulation  - Relieve pressure over bony prominences  - Avoid friction and shearing  - Provide appropriate hygiene as needed including keeping skin clean and dry  - Evaluate need for skin moisturizer/barrier cream  - Collaborate with interdisciplinary team   - Patient/family teaching  - Consider wound care consult   Outcome: Progressing     Problem: NEUROSENSORY - ADULT  Goal: Achieves stable or improved neurological status  Description: INTERVENTIONS  - Monitor and report changes in neurological status  - Monitor vital signs such as temperature, blood pressure, glucose, and any other labs ordered   - Initiate measures to prevent increased intracranial pressure  - Monitor for seizure activity and implement precautions if appropriate      Outcome: Progressing  Goal: Remains free of injury related to seizures activity  Description: INTERVENTIONS  - Maintain airway, patient safety  and administer oxygen as ordered  - Monitor patient for seizure activity, document and report duration and description of seizure to physician/advanced practitioner  - If seizure occurs,  ensure patient safety during seizure  - Reorient patient post seizure  - Seizure pads on all 4 side rails  - Instruct patient/family to notify RN of any seizure activity including if an aura is experienced  - Instruct patient/family to call for assistance with activity based on nursing assessment  - Administer anti-seizure medications if ordered    Outcome: Progressing  Goal: Achieves maximal functionality and self care  Description: INTERVENTIONS  - Monitor swallowing and airway patency with patient fatigue and changes in neurological status  - Encourage and assist patient to increase activity and self care.   - Encourage visually impaired, hearing impaired and aphasic patients to use  assistive/communication devices  Outcome: Progressing     Problem: CARDIOVASCULAR - ADULT  Goal: Maintains optimal cardiac output and hemodynamic stability  Description: INTERVENTIONS:  - Monitor I/O, vital signs and rhythm  - Monitor for S/S and trends of decreased cardiac output  - Administer and titrate ordered vasoactive medications to optimize hemodynamic stability  - Assess quality of pulses, skin color and temperature  - Assess for signs of decreased coronary artery perfusion  - Instruct patient to report change in severity of symptoms  Outcome: Progressing  Goal: Absence of cardiac dysrhythmias or at baseline rhythm  Description: INTERVENTIONS:  - Continuous cardiac monitoring, vital signs, obtain 12 lead EKG if ordered  - Administer antiarrhythmic and heart rate control medications as ordered  - Monitor electrolytes and administer replacement therapy as ordered  Outcome: Progressing

## 2024-02-19 NOTE — PLAN OF CARE
Problem: PHYSICAL THERAPY ADULT  Goal: Performs mobility at highest level of function for planned discharge setting.  See evaluation for individualized goals.  Description: Treatment/Interventions: Functional transfer training, LE strengthening/ROM, Elevations, Therapeutic exercise, Endurance training, Equipment eval/education, Bed mobility, Gait training, Spoke to nursing, Spoke to advanced practitioner, OT  Equipment Recommended: Walker (at this time)       See flowsheet documentation for full assessment, interventions and recommendations.  Outcome: Progressing  Note: Prognosis: Good  Problem List: Decreased strength, Decreased endurance, Impaired balance, Decreased mobility  Assessment: Patient received in bedside chair. Patient agreeable to therapy. Patient performs functional transfers with supervision using rolling walker. Patient performs ambulation with contact guard assistance x1 using rolling walker, 100'+10'. Patient performs static/dynamic standing balance with rolling walker. Please see flowsheet for balance activities performed.  Patient left in bedside chair with all needs met and call bell/personal items within reach. The patient's AM-PAC Basic Mobility Inpatient Short Form Raw Score is 16 showing further need for skilled PT services in order to improve functional mobility, decrease need for assistance, and return to PLOF. PT is recommending Level 3 - Minimum Resource Intensity on d/c from hospital.  Will continue to follow as able.        Rehab Resource Intensity Level, PT: III (Minimum Resource Intensity)    See flowsheet documentation for full assessment.

## 2024-02-19 NOTE — PROGRESS NOTES
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)   Moderate to severe MR s/p MVR  Acute systolic heart failure (EF 25%)  HTN  Prediabetes  Thrombocytopenia   Severe protein-calorie malnutrition    Neuro:   Cardiac Surgery Pain Control: ATC tylenol and PRN oxycodone 2.5/5mg  Robaxin 500mg Q6  Delirium precautions  Regulate sleep/wake cycle  Melatonin HS  Restoril HS PRN   CAM-ICU daily  Trend neuro exam      CV:   Cardiac Surgery Hemodynamic Monitoring: MAP goal >65 CI >2.2 Discontinue PA catheter Discontinue arterial line   Follow rhythm on telemetry  Critical Care Infusions : No cardiac infusions  Beta Blocker Plan: Consider increasing lopressor to 25 mg BID  Epicardial pacing wire plan : Epicardial wires not in place  Post op cardiac surgery medications:    mg QD  Statin: Lipitor 80 mg qHS  Amiodarone 200 mg PO q8 hours     Lung:   Good room air saturation   Chest tubes have been discontinued    GI:   Continue PPI for stress ulcer prophylaxis  Continue bowel regimen  No BM since surgery   Trend abdominal exam and bowel function    FEN:   Diuresis Plan: Lasix 40mg IV daily  Nutrition plan: as tolerated. Nutrition supplementation secondary to poor PO intake   Replenish electrolytes with goals: K >4.0, Mag >2.0, and Phos >3.0    :   Trend UOP and BUN/creat  Strict I and O     ID:   Trend temps and WBC count  Maintain normothermia    Heme:   Trend hgb and plts    Endo:   Continue SSI     MSK/Skin:  Mobility goal:   PT/OT consult as medically appropriate   Frequent turning and pressure off-loading  Local wound care as needed    Disposition:  Med Surg with Telemetry    ICU Core Measures     A: Assess, Prevent, and Manage Pain Has pain been assessed? Yes  Need for changes to pain regimen? No   B: Both  SAT/SAT  N/A   C: Choice of Sedation RASS Goal: 0 Alert and Calm  Need for changes to sedation or analgesia regimen? NA   D: Delirium CAM-ICU: Negative   E: Early Mobility  Plan for early mobility? Yes   F: Family Engagement Plan for family engagement today? Yes       Review of Invasive Devices:      Central access plan: Hemodynamic monitoring  Kersey Plan: Discontinue arterial line    Prophylaxis:  VTE VTE covered by:  heparin (porcine), Subcutaneous, 5,000 Units at 02/19/24 0510       Stress Ulcer  covered bypantoprazole (PROTONIX) EC tablet 40 mg [[id number]]          Significant 24hr Events      24 Hour Events/HPI: POD # 4 s/p CABG, MV repair and IABP placement. IABP removed on Saturday. Milrinone off yesterday morning. Chest tubes and wires removed yesterday. CI now stable. On and off low dose cardene overnight.     Critical Care Infusions : No cardiac infusions   Subjective   Review of Systems   Constitutional:  Positive for appetite change.   Respiratory:  Negative for shortness of breath.    Gastrointestinal:  Positive for constipation.        Objective     Medications:  Scheduled Continuous   acetaminophen, 650 mg, Q6H While awake  amiodarone, 200 mg, Q8H JENN  aspirin, 325 mg, Daily  atorvastatin, 80 mg, Daily With Dinner  chlorhexidine, 15 mL, BID  docusate sodium, 100 mg, BID  furosemide, 40 mg, Daily  heparin (porcine), 5,000 Units, Q8H JENN  insulin lispro, 1-5 Units, TID AC  insulin lispro, 1-5 Units, HS  melatonin, 3 mg, HS  methocarbamol, 500 mg, Q6H JENN  metoprolol tartrate, 12.5 mg, Q12H JENN  mupirocin, 1 Application, Q12H JENN  pantoprazole, 40 mg, Daily  polyethylene glycol, 17 g, Daily  potassium chloride, 20 mEq, Daily  potassium phosphate, 21 mmol, Once            Vitals: Invasive Monitoring       Most Recent Min/Max in 24hrs   Temp 98.7 °F (37.1 °C) Temp  Min: 97.2 °F (36.2 °C)  Max: 98.7 °F (37.1 °C)   Pulse 77 Pulse  Min: 71  Max: 83   Resp (!) 32 Resp  Min: 9  Max: 53   /81 BP  Min:  93/56  Max: 121/81   O2 Sat 95 % SpO2  Min: 91 %  Max: 96 %   O2 Device: None (Room air)  Nasal Cannula O2 Flow Rate (L/min): 2 L/min Arterial Line  Dyana /64  Arterial Line BP  Min: 80/39  Max: 127/64   MAP 83 mmHg  Arterial Line MAP (mmHg)  Min: 53 mmHg  Max: 85 mmHg     PA Catheter   Most Recent  Min/Max in 24hrs    PAP 27/7 PAP  Min: 22/4  Max: 51/23   CVP -2 mmHg CVP (mean)  Min: -4 mmHg  Max: 18 mmHg   CI 2 L/min/m2  CI (L/min/m2)  Min: 1.9 L/min/m2  Max: 2 L/min/m2   SVR 1273 (dyne*sec)/cm5 SVR (dyne*sec)/cm5  Min: 655 (dyne*sec)/cm5  Max: 1806 (dyne*sec)/cm5        I/O Chest tube output (if present)     Intake/Output Summary (Last 24 hours) at 2/19/2024 0558  Last data filed at 2/19/2024 0521  Gross per 24 hour   Intake 1055.97 ml   Output 1355 ml   Net -299.03 ml     UOP: 1,200mL/24 hours    BM: 0 in the last 24 hours  Weight change: 1.5 kg (3 lb 4.9 oz)          Physical Exam   Physical Exam  Eyes:      Extraocular Movements: Extraocular movements intact.      Pupils: Pupils are equal, round, and reactive to light.   Skin:     General: Skin is warm and dry.   HENT:      Head: Normocephalic and atraumatic.      Mouth/Throat:      Mouth: Mucous membranes are moist.   Neck:      Vascular: Central line present.   Cardiovascular:      Rate and Rhythm: Normal rate and regular rhythm.   Musculoskeletal:      Right lower leg: No edema.      Left lower leg: No edema.   Abdominal: General: Bowel sounds are normal.      Palpations: Abdomen is soft.      Tenderness: There is no abdominal tenderness.   Constitutional:       Appearance: He is well-developed, underweight and well-nourished.   Pulmonary:      Effort: Pulmonary effort is normal.      Breath sounds: Decreased air movement present. Examination of the right-lower field reveals decreased breath sounds. Examination of the left-lower field reveals decreased breath sounds. Decreased breath sounds present. No wheezing, rhonchi or rales.   Neurological:       General: No focal deficit present.      Mental Status: He is alert and oriented to person, place and time.      Motor: Strength full and intact in all extremities.          Diagnostic Studies               Labs:  CBC    Recent Labs     02/18/24  0406 02/19/24  0411   WBC 15.02* 10.50*   HGB 11.6* 10.1*   HCT 36.2* 32.3*   * 190     BMP    Recent Labs     02/18/24  0406 02/19/24  0428   SODIUM 136 135   K 3.8 3.9   CL 97 103   CO2 33* 26   AGAP 6 6   BUN 27* 27*   CREATININE 0.78 0.66   CALCIUM 8.5 8.4        Baseline Creat: 1.0   Coags    No recent results           Additional Electrolytes  Recent Labs     02/18/24  0406 02/19/24  0409 02/19/24  0428 02/19/24  0428   MG 2.4  --  2.2  --    PHOS 2.5  --   --  2.1*   CAIONIZED 1.10* 1.15  --   --           Blood Gas    No recent results  Recent Labs     02/19/24  0349   PHVEN 7.407*   GLJ1WGX 45.7   PO2VEN 33.1*   KLV8GPZ 28.1   BEVEN 2.9   T7VWRCK 60.5    LFTs  No recent results    Infectious    No recent results     No recent results Glucose  Recent Labs     02/17/24  1727 02/18/24  0406 02/19/24  0428   GLUC 146* 138 117          Collaborative bedside rounds performed with cardiac surgery attending, critical care attending and bedside RN.     OSCAR Drew

## 2024-02-19 NOTE — OCCUPATIONAL THERAPY NOTE
Occupational Therapy Progress Note     Patient Name: Rohan Don  Today's Date: 2/19/2024  Problem List  Principal Problem:    Coronary artery disease  Active Problems:    Acute systolic congestive heart failure (HCC)    Prediabetes    DEISI (acute kidney injury) (HCC)    Elevated transaminase level    Vitamin D deficiency    HTN (hypertension)    Pleural effusion    Severe protein-calorie malnutrition (HCC)    Nodule of upper lobe of right lung    Renal calculi    S/P CABG x 2    S/P MVR (mitral valve repair)    Ischemic cardiomyopathy    Severe left ventricular systolic dysfunction            02/19/24 0945   OT Last Visit   OT Visit Date 02/19/24   Note Type   Note Type Treatment   Pain Assessment   Pain Assessment Tool 0-10   Pain Score 2   Pain Location/Orientation Orientation: Mid;Location: Chest;Location: Incision   Patient's Stated Pain Goal No pain   Hospital Pain Intervention(s) Repositioned;Ambulation/increased activity   Restrictions/Precautions   Weight Bearing Precautions Per Order No   Other Precautions Cardiac/sternal;Multiple lines;Telemetry   Lifestyle   Autonomy pt reports being fully independent w self care, mobility, home manageement   Reciprocal Relationships supportive wife   Service to Others    Intrinsic Gratification work on his car   ADL   Where Assessed Chair  (+bathroom)   Grooming Assistance 5  Supervision/Setup   Grooming Deficit Setup;Supervision/safety   Grooming Comments Pt stands at sink for 2 minutes to brush teeth   LB Dressing Assistance 4  Minimal Assistance   LB Dressing Deficit Thread RLE into pants;Thread LLE into pants   LB Dressing Comments Pt requiring assist to begin threading BLE through pant legs, S when pulling over hips   Functional Standing Tolerance   Time 2 minutes   Activity Standing grooming   Comments Pt stands for 2 minutes to brush teeth at sink, S for safety in stance   Bed Mobility   Additional Comments Pt greeted OOB in chair, left in  chair with all needs within reach   Transfers   Sit to Stand 5  Supervision   Additional items Increased time required   Stand to Sit 5  Supervision   Additional items Increased time required   Additional Comments with RW   Functional Mobility   Functional Mobility 4  Minimal assistance  (CGA)   Additional Comments Pt performs short household distance mobility and mobility to/from bathroom with CGA with RW   Additional items Rolling walker   Cognition   Overall Cognitive Status WFL   Arousal/Participation Alert;Cooperative   Attention Within functional limits   Orientation Level Oriented X4   Memory Within functional limits   Following Commands Follows all commands and directions without difficulty   Comments Pt cooperative to therapy, verbalizes cardiac pxns   Activity Tolerance   Activity Tolerance Patient tolerated treatment well;Patient limited by fatigue   Medical Staff Made Aware Co-tx with DPT due to multiple lines, RN cleared for therapy   Assessment   Assessment Pt seen for OT treatment session day 2 on this date focused on ADL retraining, functional transfers and mobility, energy conservation, functional endurance, recall of safety precautions, and patient education. Pt was greeted in chair and was cooperative throughout session. Following session, pt was left in chair with  all needs within reach. Pt continues to be limited by functional status related to ADLs and transfers requiring MIN A for LB dressing, although demonstrates improvements in standing grooming standing for 2 minutes to brush teeth.   The patient's raw score on the AM-PAC Daily Activity Inpatient Short Form is 21. A raw score of greater than or equal to 19 suggests the patient may benefit from discharge to home. Please refer to the recommendation of the Occupational Therapist for safe discharge planning. Pt would benefit from continued acute OT intervention with plan to continue OT treatment sessions 1-2x per week. Recommend d/c to home  with increased social support, no further OT needs.   Plan   Treatment Interventions ADL retraining;Functional transfer training;Endurance training;Continued evaluation;Energy conservation;Activityengagement   Goal Expiration Date 03/03/24   OT Treatment Day 2   OT Frequency 1-2x/wk   Discharge Recommendation   Rehab Resource Intensity Level, OT No post-acute rehabilitation needs   AM-PAC Daily Activity Inpatient   Lower Body Dressing 3   Bathing 3   Toileting 3   Upper Body Dressing 4   Grooming 4   Eating 4   Daily Activity Raw Score 21   Daily Activity Standardized Score (Calc for Raw Score >=11) 44.27   AM-PAC Applied Cognition Inpatient   Following a Speech/Presentation 4   Understanding Ordinary Conversation 4   Taking Medications 4   Remembering Where Things Are Placed or Put Away 4   Remembering List of 4-5 Errands 4   Taking Care of Complicated Tasks 4   Applied Cognition Raw Score 24   Applied Cognition Standardized Score 62.21   Modified G[place] Scale   Modified [place] Scale 4   End of Consult   Education Provided Yes   Patient Position at End of Consult Bedside chair;All needs within reach   Nurse Communication Nurse aware of consult       BISHOP Land, OTR/L

## 2024-02-19 NOTE — PLAN OF CARE
Problem: OCCUPATIONAL THERAPY ADULT  Goal: Performs self-care activities at highest level of function for planned discharge setting.  See evaluation for individualized goals.  Description: Treatment Interventions: ADL retraining, Functional transfer training, Equipment evaluation/education, Continued evaluation, Cardiac education, Activityengagement, Energy conservation          See flowsheet documentation for full assessment, interventions and recommendations.   Outcome: Progressing  Note: Limitation: Decreased ADL status, Decreased endurance, Decreased self-care trans, Decreased high-level ADLs  Prognosis: Good  Assessment: Pt seen for OT treatment session day 2 on this date focused on ADL retraining, functional transfers and mobility, energy conservation, functional endurance, recall of safety precautions, and patient education. Pt was greeted in chair and was cooperative throughout session. Following session, pt was left in chair with  all needs within reach. Pt continues to be limited by functional status related to ADLs and transfers requiring MIN A for LB dressing, although demonstrates improvements in standing grooming standing for 2 minutes to brush teeth.   The patient's raw score on the -PAC Daily Activity Inpatient Short Form is 21. A raw score of greater than or equal to 19 suggests the patient may benefit from discharge to home. Please refer to the recommendation of the Occupational Therapist for safe discharge planning. Pt would benefit from continued acute OT intervention with plan to continue OT treatment sessions 1-2x per week. Recommend d/c to home with increased social support, no further OT needs.     Rehab Resource Intensity Level, OT: No post-acute rehabilitation needs

## 2024-02-19 NOTE — PHYSICAL THERAPY NOTE
PHYSICAL THERAPY NOTE          Patient Name: Rohan Don  Today's Date: 2/19/2024 02/19/24 0942   PT Last Visit   PT Visit Date 02/19/24   Note Type   Note Type Treatment   Pain Assessment   Pain Assessment Tool 0-10   Pain Score 2   Pain Location/Orientation Location: Chest;Location: Incision   Pain Onset/Description Onset: Ongoing;Frequency: Constant/Continuous;Descriptor: Discomfort   Effect of Pain on Daily Activities limits comfort and mobility   Patient's Stated Pain Goal No pain   Hospital Pain Intervention(s) Repositioned;Ambulation/increased activity;Emotional support;Splinting   Restrictions/Precautions   Weight Bearing Precautions Per Order No   Other Precautions Cardiac/sternal;Multiple lines;Telemetry   General   Chart Reviewed Yes   Family/Caregiver Present No   Cognition   Overall Cognitive Status WFL   Arousal/Participation Alert;Cooperative   Attention Within functional limits   Orientation Level Oriented X4   Memory Decreased recall of precautions   Following Commands Follows one step commands without difficulty   Comments Patient is pleasant and cooperative   Subjective   Subjective Patient agreeable to PT tx   Bed Mobility   Additional Comments OOB in chair on arrival   Transfers   Sit to Stand 5  Supervision   Additional items Increased time required;Verbal cues   Stand to Sit 5  Supervision   Additional items Increased time required;Verbal cues   Additional Comments RW   Ambulation/Elevation   Gait pattern Short stride;Inconsistent chago   Gait Assistance   (CGA)   Additional items Assist x 1;Verbal cues   Assistive Device Rolling walker   Distance 100' + 10'   Stair Management Assistance Not tested   Balance   Static Sitting Fair +   Dynamic Sitting Fair   Static Standing Fair -   Dynamic Standing Poor +   Ambulatory Fair -   Endurance Deficit   Endurance Deficit Yes   Endurance Deficit Description  fatigue, weakness   Activity Tolerance   Activity Tolerance Patient tolerated treatment well   Medical Staff Made Aware OT   Nurse Made Aware RN cleared   Exercises   Balance training  standing balance without UE support on RW with fair balance - patient requires increased assist with dynamic stability - fair balance with ambulation. Patient able to weight shift and reach minimally without LOB   Assessment   Prognosis Good   Problem List Decreased strength;Decreased endurance;Impaired balance;Decreased mobility   Assessment Patient received in bedside chair. Patient agreeable to therapy. Patient performs functional transfers with supervision using rolling walker. Patient performs ambulation with contact guard assistance x1 using rolling walker, 100'+10'. Patient performs static/dynamic standing balance with rolling walker. Please see flowsheet for balance activities performed.  Patient left in bedside chair with all needs met and call bell/personal items within reach. The patient's AM-[facility] Basic Mobility Inpatient Short Form Raw Score is 16 showing further need for skilled PT services in order to improve functional mobility, decrease need for assistance, and return to PLOF. PT is recommending Level 3 - Minimum Resource Intensity on d/c from hospital.  Will continue to follow as able.   Goals   Patient Goals to keep improving   PT Treatment Day 1   Plan   Treatment/Interventions Functional transfer training;LE strengthening/ROM;Elevations;Therapeutic exercise;Endurance training;Equipment eval/education;Bed mobility;Gait training;Spoke to nursing;Spoke to advanced practitioner;OT   Progress Progressing toward goals   PT Frequency 4-6x/wk   Discharge Recommendation   Rehab Resource Intensity Level, PT III (Minimum Resource Intensity)   Equipment Recommended Walker   AM-[facility] Basic Mobility Inpatient   Turning in Flat Bed Without Bedrails 3   Lying on Back to Sitting on Edge of Flat Bed Without Bedrails 2   Moving Bed to  Chair 3   Standing Up From Chair Using Arms 3   Walk in Room 3   Climb 3-5 Stairs With Railing 2   Basic Mobility Inpatient Raw Score 16   Basic Mobility Standardized Score 38.32   Highest Level Of Mobility   -HLM Goal 5: Stand one or more mins   JH-HLM Achieved 7: Walk 25 feet or more   End of Consult   Patient Position at End of Consult All needs within reach;Bedside chair     Faiza Rios, PT, DPT

## 2024-02-20 ENCOUNTER — APPOINTMENT (INPATIENT)
Dept: NON INVASIVE DIAGNOSTICS | Facility: HOSPITAL | Age: 64
DRG: 163 | End: 2024-02-20
Payer: COMMERCIAL

## 2024-02-20 LAB
ANION GAP SERPL CALCULATED.3IONS-SCNC: 7 MMOL/L
AORTIC ROOT: 3.6 CM
AORTIC VALVE MEAN VELOCITY: 5.9 M/S
APICAL FOUR CHAMBER EJECTION FRACTION: 29 %
AV AREA BY CONTINUOUS VTI: 2.4 CM2
AV AREA PEAK VELOCITY: 2.5 CM2
AV LVOT MEAN GRADIENT: 1 MMHG
AV LVOT PEAK GRADIENT: 2 MMHG
AV MEAN GRADIENT: 2 MMHG
AV PEAK GRADIENT: 3 MMHG
AV VALVE AREA: 2.45 CM2
AV VELOCITY RATIO: 0.72
BSA FOR ECHO PROCEDURE: 1.78 M2
BUN SERPL-MCNC: 24 MG/DL (ref 5–25)
CALCIUM SERPL-MCNC: 8.2 MG/DL (ref 8.4–10.2)
CHLORIDE SERPL-SCNC: 102 MMOL/L (ref 96–108)
CO2 SERPL-SCNC: 29 MMOL/L (ref 21–32)
CREAT SERPL-MCNC: 0.65 MG/DL (ref 0.6–1.3)
DOP CALC AO PEAK VEL: 0.85 M/S
DOP CALC AO VTI: 13.19 CM
DOP CALC LVOT AREA: 3.46 CM2
DOP CALC LVOT CARDIAC INDEX: 1.46 L/MIN/M2
DOP CALC LVOT CARDIAC OUTPUT: 2.61 L/MIN
DOP CALC LVOT DIAMETER: 2.1 CM
DOP CALC LVOT PEAK VEL VTI: 9.33 CM
DOP CALC LVOT PEAK VEL: 0.61 M/S
DOP CALC LVOT STROKE INDEX: 18.5 ML/M2
DOP CALC LVOT STROKE VOLUME: 32.3
DOP CALC MV VTI: 27.97 CM
E WAVE DECELERATION TIME: 132 MS
E/A RATIO: 2.59
ERYTHROCYTE [DISTWIDTH] IN BLOOD BY AUTOMATED COUNT: 13.1 % (ref 11.6–15.1)
FRACTIONAL SHORTENING: 13 (ref 28–44)
GFR SERPL CREATININE-BSD FRML MDRD: 104 ML/MIN/1.73SQ M
GLUCOSE SERPL-MCNC: 105 MG/DL (ref 65–140)
GLUCOSE SERPL-MCNC: 118 MG/DL (ref 65–140)
GLUCOSE SERPL-MCNC: 95 MG/DL (ref 65–140)
GLUCOSE SERPL-MCNC: 96 MG/DL (ref 65–140)
GLUCOSE SERPL-MCNC: 98 MG/DL (ref 65–140)
HCT VFR BLD AUTO: 31.6 % (ref 36.5–49.3)
HGB BLD-MCNC: 9.8 G/DL (ref 12–17)
INTERVENTRICULAR SEPTUM IN DIASTOLE (PARASTERNAL SHORT AXIS VIEW): 1 CM
INTERVENTRICULAR SEPTUM: 1 CM (ref 0.6–1.1)
LAAS-AP2: 23.6 CM2
LAAS-AP4: 21.7 CM2
LEFT ATRIUM SIZE: 2.5 CM
LEFT ATRIUM VOLUME (MOD BIPLANE): 72 ML
LEFT ATRIUM VOLUME INDEX (MOD BIPLANE): 40.4 ML/M2
LEFT INTERNAL DIMENSION IN SYSTOLE: 4.2 CM (ref 2.1–4)
LEFT VENTRICULAR INTERNAL DIMENSION IN DIASTOLE: 4.8 CM (ref 3.5–6)
LEFT VENTRICULAR POSTERIOR WALL IN END DIASTOLE: 1.1 CM
LEFT VENTRICULAR STROKE VOLUME: 28 ML
LVSV (TEICH): 28 ML
MAGNESIUM SERPL-MCNC: 2.1 MG/DL (ref 1.9–2.7)
MCH RBC QN AUTO: 29.1 PG (ref 26.8–34.3)
MCHC RBC AUTO-ENTMCNC: 31 G/DL (ref 31.4–37.4)
MCV RBC AUTO: 94 FL (ref 82–98)
MV E'TISSUE VEL-LAT: 9 CM/S
MV E'TISSUE VEL-SEP: 6 CM/S
MV EROA: 0.6 CM2
MV MEAN GRADIENT: 2 MMHG
MV PEAK A VEL: 0.44 M/S
MV PEAK E VEL: 114 CM/S
MV PEAK GRADIENT: 6 MMHG
MV STENOSIS PRESSURE HALF TIME: 38 MS
MV VALVE AREA BY CONTINUITY EQUATION: 1.15 CM2
MV VALVE AREA P 1/2 METHOD: 5.79
PISA MRMAX VEL: 0.31 M/S
PISA RADIUS: 1.1 CM
PLATELET # BLD AUTO: 238 THOUSANDS/UL (ref 149–390)
PMV BLD AUTO: 9.2 FL (ref 8.9–12.7)
POTASSIUM SERPL-SCNC: 3.9 MMOL/L (ref 3.5–5.3)
RA PRESSURE ESTIMATED: 12 MMHG
RBC # BLD AUTO: 3.37 MILLION/UL (ref 3.88–5.62)
RIGHT ATRIUM AREA SYSTOLE A4C: 19.2 CM2
RIGHT VENTRICLE ID DIMENSION: 4.4 CM
RV PSP: 39 MMHG
SL CV LEFT ATRIUM LENGTH A2C: 5.7 CM
SL CV LV EF: 25
SL CV PED ECHO LEFT VENTRICLE DIASTOLIC VOLUME (MOD BIPLANE) 2D: 106 ML
SL CV PED ECHO LEFT VENTRICLE SYSTOLIC VOLUME (MOD BIPLANE) 2D: 78 ML
SODIUM SERPL-SCNC: 138 MMOL/L (ref 135–147)
TR MAX PG: 27 MMHG
TR PEAK VELOCITY: 2.6 M/S
TRICUSPID VALVE PEAK REGURGITATION VELOCITY: 2.59 M/S
WBC # BLD AUTO: 8.86 THOUSAND/UL (ref 4.31–10.16)

## 2024-02-20 PROCEDURE — 83735 ASSAY OF MAGNESIUM: CPT | Performed by: NURSE PRACTITIONER

## 2024-02-20 PROCEDURE — 93325 DOPPLER ECHO COLOR FLOW MAPG: CPT

## 2024-02-20 PROCEDURE — 80048 BASIC METABOLIC PNL TOTAL CA: CPT | Performed by: NURSE PRACTITIONER

## 2024-02-20 PROCEDURE — 93308 TTE F-UP OR LMTD: CPT | Performed by: INTERNAL MEDICINE

## 2024-02-20 PROCEDURE — 93308 TTE F-UP OR LMTD: CPT

## 2024-02-20 PROCEDURE — 93325 DOPPLER ECHO COLOR FLOW MAPG: CPT | Performed by: INTERNAL MEDICINE

## 2024-02-20 PROCEDURE — 85027 COMPLETE CBC AUTOMATED: CPT | Performed by: NURSE PRACTITIONER

## 2024-02-20 PROCEDURE — 97530 THERAPEUTIC ACTIVITIES: CPT

## 2024-02-20 PROCEDURE — 82948 REAGENT STRIP/BLOOD GLUCOSE: CPT

## 2024-02-20 PROCEDURE — 93321 DOPPLER ECHO F-UP/LMTD STD: CPT | Performed by: INTERNAL MEDICINE

## 2024-02-20 PROCEDURE — 93321 DOPPLER ECHO F-UP/LMTD STD: CPT

## 2024-02-20 PROCEDURE — 97116 GAIT TRAINING THERAPY: CPT

## 2024-02-20 PROCEDURE — 99024 POSTOP FOLLOW-UP VISIT: CPT | Performed by: THORACIC SURGERY (CARDIOTHORACIC VASCULAR SURGERY)

## 2024-02-20 PROCEDURE — 99232 SBSQ HOSP IP/OBS MODERATE 35: CPT | Performed by: INTERNAL MEDICINE

## 2024-02-20 RX ORDER — TORSEMIDE 20 MG/1
20 TABLET ORAL DAILY
Status: DISCONTINUED | OUTPATIENT
Start: 2024-02-20 | End: 2024-02-21 | Stop reason: HOSPADM

## 2024-02-20 RX ORDER — LISINOPRIL 5 MG/1
5 TABLET ORAL DAILY
Status: DISCONTINUED | OUTPATIENT
Start: 2024-02-21 | End: 2024-02-21 | Stop reason: HOSPADM

## 2024-02-20 RX ORDER — LISINOPRIL 2.5 MG/1
2.5 TABLET ORAL DAILY
Status: DISCONTINUED | OUTPATIENT
Start: 2024-02-20 | End: 2024-02-20

## 2024-02-20 RX ORDER — BISACODYL 5 MG/1
10 TABLET, DELAYED RELEASE ORAL ONCE
Status: COMPLETED | OUTPATIENT
Start: 2024-02-20 | End: 2024-02-20

## 2024-02-20 RX ORDER — METHOCARBAMOL 500 MG/1
500 TABLET, FILM COATED ORAL EVERY 6 HOURS PRN
Status: DISCONTINUED | OUTPATIENT
Start: 2024-02-20 | End: 2024-02-21 | Stop reason: HOSPADM

## 2024-02-20 RX ADMIN — BISACODYL 10 MG: 5 TABLET, COATED ORAL at 11:43

## 2024-02-20 RX ADMIN — METOPROLOL TARTRATE 25 MG: 25 TABLET, FILM COATED ORAL at 10:09

## 2024-02-20 RX ADMIN — TORSEMIDE 20 MG: 20 TABLET ORAL at 09:29

## 2024-02-20 RX ADMIN — POLYETHYLENE GLYCOL 3350 17 G: 17 POWDER, FOR SOLUTION ORAL at 09:28

## 2024-02-20 RX ADMIN — ASPIRIN 325 MG ORAL TABLET 325 MG: 325 PILL ORAL at 09:28

## 2024-02-20 RX ADMIN — PERFLUTREN 0.8 ML/MIN: 6.52 INJECTION, SUSPENSION INTRAVENOUS at 11:53

## 2024-02-20 RX ADMIN — ACETAMINOPHEN 650 MG: 325 TABLET, FILM COATED ORAL at 09:28

## 2024-02-20 RX ADMIN — DOCUSATE SODIUM 100 MG: 100 CAPSULE, LIQUID FILLED ORAL at 09:29

## 2024-02-20 RX ADMIN — MUPIROCIN 1 APPLICATION: 20 OINTMENT TOPICAL at 09:31

## 2024-02-20 RX ADMIN — POTASSIUM CHLORIDE 20 MEQ: 1500 TABLET, EXTENDED RELEASE ORAL at 09:29

## 2024-02-20 RX ADMIN — Medication 2.5 MG: at 06:26

## 2024-02-20 RX ADMIN — AMIODARONE HYDROCHLORIDE 200 MG: 200 TABLET ORAL at 13:56

## 2024-02-20 RX ADMIN — ATORVASTATIN CALCIUM 80 MG: 80 TABLET, FILM COATED ORAL at 17:03

## 2024-02-20 RX ADMIN — MELATONIN 3 MG: at 21:46

## 2024-02-20 RX ADMIN — AMIODARONE HYDROCHLORIDE 200 MG: 200 TABLET ORAL at 05:59

## 2024-02-20 RX ADMIN — ACETAMINOPHEN 650 MG: 325 TABLET, FILM COATED ORAL at 21:46

## 2024-02-20 RX ADMIN — LISINOPRIL 2.5 MG: 2.5 TABLET ORAL at 09:29

## 2024-02-20 RX ADMIN — AMIODARONE HYDROCHLORIDE 200 MG: 200 TABLET ORAL at 21:46

## 2024-02-20 RX ADMIN — OXYCODONE HYDROCHLORIDE 5 MG: 5 TABLET ORAL at 13:56

## 2024-02-20 RX ADMIN — METHOCARBAMOL 500 MG: 500 TABLET ORAL at 05:59

## 2024-02-20 RX ADMIN — DOCUSATE SODIUM 100 MG: 100 CAPSULE, LIQUID FILLED ORAL at 17:03

## 2024-02-20 RX ADMIN — ACETAMINOPHEN 650 MG: 325 TABLET, FILM COATED ORAL at 13:56

## 2024-02-20 RX ADMIN — HEPARIN SODIUM 5000 UNITS: 5000 INJECTION INTRAVENOUS; SUBCUTANEOUS at 21:46

## 2024-02-20 RX ADMIN — PANTOPRAZOLE SODIUM 40 MG: 40 TABLET, DELAYED RELEASE ORAL at 09:29

## 2024-02-20 RX ADMIN — HEPARIN SODIUM 5000 UNITS: 5000 INJECTION INTRAVENOUS; SUBCUTANEOUS at 05:59

## 2024-02-20 RX ADMIN — HEPARIN SODIUM 5000 UNITS: 5000 INJECTION INTRAVENOUS; SUBCUTANEOUS at 13:56

## 2024-02-20 NOTE — PROGRESS NOTES
Advanced Heart Failure / Pulmonary Hypertension Service Progress Note    Rohan Don 63 y.o. male   MRN: [id number]  Unit/Bed#: [facility] 412-01; Encounter: [id number]    Assessment:  Principal Problem:    S/P MVR (mitral valve repair)  Active Problems:    Acute systolic congestive heart failure (HCC)    Prediabetes    DEISI (acute kidney injury) (HCC)    Elevated transaminase level    Vitamin D deficiency    HTN (hypertension)    Pleural effusion    Severe protein-calorie malnutrition (HCC)    Coronary artery disease    Nodule of upper lobe of right lung    Renal calculi    S/P CABG x 2    Ischemic cardiomyopathy    Severe left ventricular systolic dysfunction      Subjective:   Patient seen and examined. No significant events overnight. Feeling well. Ambulating without an acute difficulties. Denies SOB at rest, PND, and orthopnea.     Objective:   Intake/ Output: 900 mL / 1525 mL.   Weight: 134 lbs, standing (129 lbs on 02/19, bed weight).   Telemetry: NSR, rates in 70-80s.   MAPs: .    Today's Plan:  Transitioned to PO torsemide this AM by primary team.   Increase lisinopril to 5 mg daily.   Repeat limited TTE checked this AM: LVEF 20-25%.   Consider switch to succinate prior to discharge?  Continues on PO amiodarone and ASA per CT surgery.   Hemoglobin of 9.8 this AM (16.5 mg/dL pre-operatively). Continue to monitor.   Lacks Rx coverage; KEVIN VASQUEZ pending per chart review.     Plan:  Coronary artery disease  S/p CAGB x2 (LIMA-LAD; SVG-RCA) with MV repair on 02/15/2024 by Dr. Snyder.   Continue on full dose aspirin, statin, and BB as below.    Chronic HFrEF; LVEF 20-25%; LVIDd 6 cm   Etiology: ischemic.    TTE 02/10/2024: LVEF 20%. LVIDd 6 cm. Grade 2 DD. Moderately reduced RVSF. RICHARD (L>R). Moderate to severe MR. Mild TR.    TTE (limited) 02/20/2024: LVEF 25%. LVIDd 4.8 cm. Moderately reduced RVSF. RICHARD (L>R). MV repair noted with gradient 2 mmHg. Mild TR. Left pleural effusion noted.  "    Pharmacotherapies / Neurohormonal Blockade:  --Beta Blocker: metoprolol tartrate 25 mg q12 hours.   --ARNi / ACEi / ARB: lisinopril 5 mg daily (Entresto cost-prohibitive, lacks Rx coverage).  --Aldosterone Antagonist: No.   --SGLT2 Inhibitor: No (SGLT2i cost-prohibitive; lacks Rx coverage).  --Home Diuretic: No.   --Inpatient Diuretic: torsemide 20 mg daily with potassium 20 mEq daily.     Sudden Cardiac Death Risk Reduction:  --LVEF 20-25%.    --Plan to reassess LVEF with limited TTE after 3-4 months of uninterrupted and optimized HF GDMT (earliest mid 05/2024).    Electrical Resynchronization:  --Candidacy for BiV device: narrow QRS.     Advanced Therapies (if appropriate): Will continue to monitor.    S/p mitral valve repair: at time of CABG in 02/2024.  Hypertension  Prediabetes  Protein-calorie malnutrition    Vitals:   Blood pressure 117/79, pulse 84, temperature 97.7 °F (36.5 °C), temperature source Oral, resp. rate 16, height 5' 11\" (1.803 m), weight 60.8 kg (134 lb), SpO2 98%.    I/O last 3 completed shifts:  In: 1342.5 [P.O.:900; I.V.:92.5; IV Piggyback:350]  Out: 2000 [Urine:2000]    Weight (last 2 days)       Date/Time Weight    02/20/24 1035 60.8 (134)    02/20/24 0644 61.2 (134.92)    02/19/24 0549 58.9 (129.85)    02/18/24 0550 57.4 (126.54)          Wt Readings from Last 10 Encounters:   02/20/24 60.8 kg (134 lb)     Vitals:    02/20/24 0644 02/20/24 0733 02/20/24 0928 02/20/24 1035   BP:  145/79 121/70 117/79   BP Location:  Left arm  Left arm   Pulse:  86  84   Resp:  16  16   Temp:  (!) 97.3 °F (36.3 °C)  97.7 °F (36.5 °C)   TempSrc:  Oral  Oral   SpO2:  97%  98%   Weight: 61.2 kg (134 lb 14.7 oz)   60.8 kg (134 lb)   Height:    5' 11\" (1.803 m)       Physical Exam  Vitals reviewed.   Constitutional:       General: He is awake. He is not in acute distress.     Appearance: Normal appearance. He is well-developed and underweight. He is not toxic-appearing or diaphoretic.      Comments: Seated in " chair   HENT:      Head: Normocephalic.      Nose: Nose normal.      Mouth/Throat:      Mouth: Mucous membranes are moist.   Eyes:      General: No scleral icterus.     Conjunctiva/sclera: Conjunctivae normal.   Neck:      Vascular: No JVD.      Trachea: No tracheal deviation.   Cardiovascular:      Rate and Rhythm: Normal rate and regular rhythm. No extrasystoles are present.  Pulmonary:      Effort: Pulmonary effort is normal. No tachypnea, bradypnea or respiratory distress.      Breath sounds: Normal air entry. Examination of the right-lower field reveals decreased breath sounds. Examination of the left-lower field reveals decreased breath sounds. Decreased breath sounds present. No wheezing.   Abdominal:      General: Bowel sounds are normal.      Tenderness: There is no abdominal tenderness.   Musculoskeletal:      Cervical back: Neck supple.      Right lower leg: Edema (trace) present.      Left lower leg: Edema (trace) present.   Skin:     General: Skin is warm and dry.      Coloration: Skin is pale. Skin is not jaundiced.   Neurological:      General: No focal deficit present.      Mental Status: He is alert and oriented to person, place, and time.   Psychiatric:         Attention and Perception: Attention normal.         Mood and Affect: Mood and affect normal.         Speech: Speech normal.         Behavior: Behavior normal. Behavior is cooperative.         Thought Content: Thought content normal.       Lines/Drains/Airways       Active Status       Name Placement date Placement time Site Days    CVC Central Lines 02/15/24 02/15/24  0816  --  5                      Current Facility-Administered Medications:     acetaminophen (TYLENOL) tablet 650 mg, 650 mg, Oral, Q6H While awake, OSCAR Drew, 650 mg at 02/20/24 0928    amiodarone tablet 200 mg, 200 mg, Oral, Q8H JENN, OSCAR Drew, 200 mg at 02/20/24 0559    aspirin tablet 325 mg, 325 mg, Oral, Daily, [place]  OCSAR Peralta, 325 mg at 02/20/24 0928    atorvastatin (LIPITOR) tablet 80 mg, 80 mg, Oral, Daily With Dinner, OSCAR Drew, 80 mg at 02/19/24 1747    bisacodyl (DULCOLAX) rectal suppository 10 mg, 10 mg, Rectal, Daily PRN, OSCAR Drew    docusate sodium (COLACE) capsule 100 mg, 100 mg, Oral, BID, OSCAR Drew, 100 mg at 02/20/24 0929    heparin (porcine) subcutaneous injection 5,000 Units, 5,000 Units, Subcutaneous, Q8H JENN, 5,000 Units at 02/20/24 0559 **AND** [CANCELED] Platelet count, , , Once, Judi Espinosa PA-C    insulin lispro (HumaLOG) 100 units/mL subcutaneous injection 1-5 Units, 1-5 Units, Subcutaneous, TID AC **AND** Fingerstick Glucose (POCT), , , TID AC, OSCAR Drew    insulin lispro (HumaLOG) 100 units/mL subcutaneous injection 1-5 Units, 1-5 Units, Subcutaneous, HS, OSCAR Drew    [START ON 2/21/2024] lisinopril (ZESTRIL) tablet 5 mg, 5 mg, Oral, Daily, Alyssa Amezcua PA-C    melatonin tablet 3 mg, 3 mg, Oral, HS, OSCAR Drew, 3 mg at 02/19/24 2102    methocarbamol (ROBAXIN) tablet 500 mg, 500 mg, Oral, Q6H PRN, Salima Maddox PA-C    metoprolol tartrate (LOPRESSOR) tablet 25 mg, 25 mg, Oral, Q12H JENN, Salima Maddox PA-C, 25 mg at 02/20/24 1009    ondansetron (ZOFRAN) injection 4 mg, 4 mg, Intravenous, Q6H PRN, OSCAR Drew, 4 mg at 02/18/24 0242    oxyCODONE (ROXICODONE) split tablet 2.5 mg, 2.5 mg, Oral, Q4H PRN, 2.5 mg at 02/20/24 0626 **OR** oxyCODONE (ROXICODONE) IR tablet 5 mg, 5 mg, Oral, Q4H PRN, Va Peralta, RIITKANP, 5 mg at 02/18/24 1426    pantoprazole (PROTONIX) EC tablet 40 mg, 40 mg, Oral, Daily, RITIKA DrewNP, 40 mg at 02/20/24 0929    polyethylene glycol (MIRALAX) packet 17 g, 17 g, Oral, Daily, Va Peralta, RITIKANP, 17 g at 02/20/24 0928    potassium chloride (Klor-Con M20) CR tablet 20  mEq, 20 mEq, Oral, Daily, OSCAR Drew, 20 mEq at 02/20/24 0929    temazepam (RESTORIL) capsule 15 mg, 15 mg, Oral, HS PRN, OSCAR Drew    torsemide (DEMADEX) tablet 20 mg, 20 mg, Oral, Daily, Salima Maddox PA-C, 20 mg at 02/20/24 0929    Labs & Results:      Results from last 7 days   Lab Units 02/20/24  0428 02/19/24  0411 02/18/24  0406   WBC Thousand/uL 8.86 10.50* 15.02*   HEMOGLOBIN g/dL 9.8* 10.1* 11.6*   HEMATOCRIT % 31.6* 32.3* 36.2*   PLATELETS Thousands/uL 238 190 141*         Results from last 7 days   Lab Units 02/20/24  0428 02/19/24  0428 02/18/24  0406 02/17/24  1727 02/17/24  0357 02/16/24  1226 02/16/24  0422 02/15/24  1808 02/15/24  1353 02/15/24  1348 02/15/24  1246 02/15/24  1147 02/15/24  0822 02/15/24  0533   POTASSIUM mmol/L 3.9 3.9 3.8   < > 4.0   < > 4.8  4.9   < >  --    < >  --   --   --  4.3   CHLORIDE mmol/L 102 103 97   < > 104   < > 109*  111*  --   --    < >  --   --   --  105   CO2 mmol/L 29 26 33*   < > 26   < > 22  22  --   --    < >  --   --   --  21   CO2, I-STAT mmol/L  --   --   --   --   --   --   --   --  21  --  22 26   < >  --    BUN mg/dL 24 27* 27*   < > 29*   < > 20  20  --   --    < >  --   --   --  25   CREATININE mg/dL 0.65 0.66 0.78   < > 1.19   < > 1.10  1.11  --   --    < >  --   --   --  0.90   CALCIUM mg/dL 8.2* 8.4 8.5   < > 8.5   < > 7.8*  8.1*  --   --    < >  --   --   --  9.5   ALK PHOS U/L  --   --   --   --  41  --  36  --   --   --   --   --   --  62   ALT U/L  --   --   --   --  28  --  48  --   --   --   --   --   --  81*   AST U/L  --   --   --   --  46*  --  57*  --   --   --   --   --   --  47*   GLUCOSE, ISTAT mg/dl  --   --   --   --   --   --   --   --  100  --  158* 247*   < >  --     < > = values in this interval not displayed.     Results from last 7 days   Lab Units 02/15/24  1348   INR  1.45*     Alyssa Amezcua PA-C

## 2024-02-20 NOTE — CASE MANAGEMENT
Case Management Discharge Planning Note    Patient name Rohan Don  Location [facility] 412/[facility] 412-01 MRN [id number]  : 1960 Date 2024       Current Admission Date: 2024  Current Admission Diagnosis:S/P MVR (mitral valve repair)   Patient Active Problem List    Diagnosis Date Noted    S/P CABG x 2 02/15/2024    S/P MVR (mitral valve repair) 02/15/2024    Ischemic cardiomyopathy 02/15/2024    Severe left ventricular systolic dysfunction 02/15/2024    Nodule of upper lobe of right lung 2024    Renal calculi 2024    Coronary artery disease 2024    Severe protein-calorie malnutrition (HCC) 2024    Acute systolic congestive heart failure (HCC) 2024    Prediabetes 2024    DEISI (acute kidney injury) (HCC) 2024    Elevated transaminase level 2024    Pleural effusion 2024    HTN (hypertension) 2019    Vitamin D deficiency 2014      LOS (days): 11  Geometric Mean LOS (GMLOS) (days):   Days to GMLOS:     OBJECTIVE:  Risk of Unplanned Readmission Score: 15.06         Current admission status: Inpatient   Preferred Pharmacy:   [id number] - [place], [place] - [address]  [place] [address]  Phone: [phone] Fax: [phone]    Primary Care Provider: No primary care provider on file.    Primary Insurance: KEVIN VASQUEZ PENDING  Secondary Insurance:     DISCHARGE DETAILS:                                Requested Home Health Care         Home Health Discipline requested:: Nursing, Physical Therapy  Home Health Follow-Up Provider:: PCP  Home Health Services Needed:: Post-Op Care and Assessment, Diabetes Management, Evaluate Functional Status and Safety, Gait/ADL Training, Strengthening/Theraputic Exercises to Improve Function  Supporting Clincal Findings:: Fatigues Easliy in Short Distances, Limited Endurance         Other Referral/Resources/Interventions Provided:  Interventions: [facility]            Discharge  Destination Plan:: Home with Home Health Care  Transport at Discharge : Family                                      Additional Comments: POD#5 MVR and CABG x2. Hx CHF, pre-DM, ischemic CM. EF 20% and Life Vest was ordered. Has [facility] reserved for nsg and home PT. Family to transport when ready.

## 2024-02-20 NOTE — PHYSICAL THERAPY NOTE
PHYSICAL THERAPY NOTE          Patient Name: Rohan Don  Today's Date: 2/20/2024 02/20/24 1234   PT Last Visit   PT Visit Date 02/20/24   Note Type   Note Type Treatment   Pain Assessment   Pain Assessment Tool 0-10   Pain Score No Pain   Restrictions/Precautions   Other Precautions Cardiac/sternal;Telemetry   General   Chart Reviewed Yes   Additional Pertinent History cleared for Tx session by nsg   Response to Previous Treatment Patient with no complaints from previous session.   Cognition   Overall Cognitive Status WFL   Arousal/Participation Alert;Cooperative   Attention Within functional limits   Orientation Level Oriented to person;Oriented to place;Oriented to situation   Memory Within functional limits   Following Commands Follows one step commands without difficulty   Subjective   Subjective Alert; in bed; agreeable to amb and try steps;   Bed Mobility   Supine to Sit 6  Modified independent   Transfers   Sit to Stand 6  Modified independent   Stand to Sit 6  Modified independent   Ambulation/Elevation   Gait pattern Short stride;Inconsistent chago  (no overt LOB, knee buckling or swaying)   Gait Assistance 6  Modified independent   Assistive Device Rolling walker;None  (rw --> none)   Distance 70 ft w/ rw + 2 x 10 ft w/o AD + 2 x 160 ft w/o AD w/ seated rest periods and steps negotiation in between   Stair Management Assistance 6  Modified independent  (reviewed sequencing and UE precaution on the hand rail)   Stair Management Technique One rail R;Step to pattern;Foreward;Backward;Nonreciprocal   Number of Stairs 4  (1 step x 2 + 2 steps)   Balance   Static Sitting Good   Static Standing Fair +   Ambulatory Fair   Activity Tolerance   Activity Tolerance Patient tolerated treatment well   Nurse Made Aware spoke to SNEHA Sommers and SNEHA Tan   Exercises   Knee AROM Long Arc Quad Sitting;20 reps;AROM;Bilateral   Ankle  Pumps Sitting;20 reps;AROM;Bilateral   Marching Sitting;10 reps;AROM;Bilateral   Assessment   Assessment Pt demonstrated overall significant improvement in balance, endurance and all aspects of observed mobility progressing to mod (I) level on level surfaces (incl amb w/o AD) and on the steps; no overt uncorrected LOB, gross knee buckling, or swaying observed during the session; no increased discomfort or excessive fatigue expressed; HEP reviewed/performed; pt appeared to be comfortable at the end of session/reclined; overall, cont to anticipate pt will return home w/ available family support upon D/C from PT/mobility stand point and when medically cleared; no other immediate PT needs otherwise identified while in the hospital; pt informed and concurred; pt expressed no concerns about going home from mobility stand point, when medically cleared; will therefore sign off   Goals   Patient Goals to go home   PT Treatment Day 2   Plan   Treatment/Interventions Spoke to nursing   Progress Discontinue PT   PT Frequency Other (Comment)  (D/C PT)   Discharge Recommendation   Rehab Resource Intensity Level, PT III (Minimum Resource Intensity)   AM-PAC Basic Mobility Inpatient   Turning in Flat Bed Without Bedrails 4   Lying on Back to Sitting on Edge of Flat Bed Without Bedrails 4   Moving Bed to Chair 4   Standing Up From Chair Using Arms 4   Walk in Room 4   Climb 3-5 Stairs With Railing 4   Basic Mobility Inpatient Raw Score 24   Basic Mobility Standardized Score 57.68   Highest Level Of Mobility   JH-HLM Goal 8: Walk 250 feet or more   JH-HLM Achieved 8: Walk 250 feet ot more   Education   Education Provided Mobility training;Home exercise program   Patient Demonstrates verbal understanding   End of Consult   Patient Position at End of Consult Bedside chair;All needs within reach       Kwesi Germain

## 2024-02-20 NOTE — PROGRESS NOTES
Progress Note - Cardiothoracic Surgery   Rohan Don 63 y.o. male MRN: [id number]  Unit/Bed#: [facility] 412-01 Encounter: [id number]    Coronary artery disease, ischemic and non-ischemic cardiomyopathy, Functional mitral regurgitation    S/P Intra-aortic balloon pump placement; Coronary artery bypass grafting x2 Mitral valve repair  POD # 5    24 Hour Events: No events overnight.  NO complaints.  Ambulating without much assistance. Pain controlled.  + flatus no BM yet     Medications:   Scheduled Meds:  Current Facility-Administered Medications   Medication Dose Route Frequency Provider Last Rate    acetaminophen  650 mg Oral Q6H While awake [facility], CRNP      amiodarone  200 mg Oral Q8H [facility], CRNP      aspirin  325 mg Oral Daily [facility], CRNP      atorvastatin  80 mg Oral Daily With Dinner [facility], CRNP      bisacodyl  10 mg Rectal Daily PRN [facility], CRNP      docusate sodium  100 mg Oral BID [facility], CRNP      furosemide  40 mg Intravenous Daily [facility], CRNP      heparin (porcine)  5,000 Units Subcutaneous Q8H [facility], CRNP      insulin lispro  1-5 Units Subcutaneous TID AC [facility], CRNP      insulin lispro  1-5 Units Subcutaneous HS [facility], CRNP      melatonin  3 mg Oral HS [facility], CRNP      methocarbamol  500 mg Oral Q6H [facility], CRNP      metoprolol tartrate  25 mg Oral Q12H [facility], CRNP      mupirocin  1 Application Nasal Q12H [facility], CRNP      ondansetron  4 mg Intravenous Q6H PRN [facility], CRNP      oxyCODONE  2.5 mg Oral Q4H PRN [facility], CRNP      Or    oxyCODONE  5 mg Oral Q4H PRN [facility], CRNP      pantoprazole  40 mg Oral Daily [facility], CRNP      polyethylene  glycol  17 g Oral Daily [facility] Peral[facility], OSCAR      potassium chloride  20 mEq Oral Daily OSCAR Drew      temazepam  15 mg Oral HS PRN OSCAR Drew       Continuous Infusions:   PRN Meds:.  bisacodyl    ondansetron    oxyCODONE **OR** oxyCODONE    temazepam    Vitals:   Vitals:    02/19/24 2305 02/20/24 0310 02/20/24 0644 02/20/24 0733   BP: 103/68 111/73  145/79   BP Location: Left arm   Left arm   Pulse: 72 76  86   Resp: 18 18  16   Temp: 97.6 °F (36.4 °C) 97.7 °F (36.5 °C)  (!) 97.3 °F (36.3 °C)   TempSrc: Oral Oral  Oral   SpO2: 97% 97%  97%   Weight:   61.2 kg (134 lb 14.7 oz)    Height:           Telemetry: NSR; Heart Rate: 85    Respiratory:   SpO2: SpO2: 97 %; Room Air    Intake/Output:     Intake/Output Summary (Last 24 hours) at 2/20/2024 0822  Last data filed at 2/20/2024 0735  Gross per 24 hour   Intake 420 ml   Output 1875 ml   Net -1455 ml      Weights:   Weight (last 2 days)       Date/Time Weight    02/20/24 0644 61.2 (134.92)    02/19/24 0549 58.9 (129.85)    02/18/24 0550 57.4 (126.54)              Results:   Results from last 7 days   Lab Units 02/20/24 0428 02/19/24  0411 02/18/24  0406   WBC Thousand/uL 8.86 10.50* 15.02*   HEMOGLOBIN g/dL 9.8* 10.1* 11.6*   HEMATOCRIT % 31.6* 32.3* 36.2*   PLATELETS Thousands/uL 238 190 141*     Results from last 7 days   Lab Units 02/20/24  0428 02/19/24  0428 02/18/24  0406 02/15/24  1808 02/15/24  1353   SODIUM mmol/L 138 135 136   < >  --    POTASSIUM mmol/L 3.9 3.9 3.8   < >  --    CHLORIDE mmol/L 102 103 97   < >  --    CO2 mmol/L 29 26 33*   < >  --    CO2, I-STAT mmol/L  --   --   --   --  21   BUN mg/dL 24 27* 27*   < >  --    CREATININE mg/dL 0.65 0.66 0.78   < >  --    GLUCOSE, ISTAT mg/dl  --   --   --   --  100   CALCIUM mg/dL 8.2* 8.4 8.5   < >  --     < > = values in this interval not displayed.     Results from last 7 days   Lab Units 02/15/24  1348   INR  1.45*   PTT seconds 30     Point of  care glucose:     Invasive Lines/Tubes:  Invasive Devices       Central Venous Catheter Line  Duration             CVC Central Lines 02/15/24 5 days                    Physical Exam:    General: No acute distress and Alert  HEENT/NECK:  Normocephalic. Atraumatic.  No jugular venous distention.    Cardiac: Regular rate and rhythm  Pulmonary:  Breath sounds clear bilaterally  Abdomen:  Non-tender, Non-distended, and Normal bowel sounds  Incisions: Sternum is stable.  Incision is clean, dry, and intact.  and Saphenectomy incison is clean, dry, and intact.   Extremities: Extremities warm/dry and No edema B/L  Neuro: Alert and oriented X 3  Skin: Warm/Dry, without rashes or lesions.    Assessment:  Principal Problem:    S/P MVR (mitral valve repair)  Active Problems:    Acute systolic congestive heart failure (HCC)    Prediabetes    DEISI (acute kidney injury) (HCC)    Elevated transaminase level    Vitamin D deficiency    HTN (hypertension)    Pleural effusion    Severe protein-calorie malnutrition (HCC)    Coronary artery disease    Nodule of upper lobe of right lung    Renal calculi    S/P CABG x 2    Ischemic cardiomyopathy    Severe left ventricular systolic dysfunction       Coronary artery disease, ischemic and non-ischemic cardiomyopathy, Functional mitral regurgitation    S/P Intra-aortic balloon pump placement; Coronary artery bypass grafting x2 Mitral valve repair  POD # 5    Plan:    Cardiac: LVEF 20% - limited echo today - will most likely have lifevest need at d/c  NSR; HR well-controlled  BP well-controlled  On Metoprolol 12.5 mg bid  Start lisinopril 2.5 mg daily  Continue prophylactic Amiodarone, 200 mg PO TID  Continue ASA and Statin therapy  Central IV access no longer required; Remove central venous catheter today    Continue Subcutaneous Heparin for DVT prophylaxis    Pulmonary:     Good Room air oxygen saturation; Continue incentive spirometry/Coughing/Deep breathing exercises    Chest tubes  have been discontinued    Renal:     Post op Creatinine stable; Follow up labs prn     Intake/Output net: -1455 mL/24 hours    Diuretic Regimen: po torsemide daily start     Neuro:    Neurologically intact; No active issues     GI:    Cardiac diet, with 1800 mL fluid restriction    Tolerating diet without complaint    Continue stool softeners and prn suppository    Continue GI prophylaxis    Endo: SSI coverage     7    Hematology:     Post-operative acute blood loss anemia; Hemoglobin 9.8; trend prn    8.   Disposition:        Anticipated discharge date: tomorrow        VTE Pharmacologic Prophylaxis: Heparin  VTE Mechanical Prophylaxis: sequential compression device    Collaborative rounds completed with supervising physician  Plan of care discussed with bedside nurse    SIGNATURE: Salima Maddox PA-C  DATE: February 20, 2024  TIME: 8:22 AM

## 2024-02-20 NOTE — PLAN OF CARE
Problem: PHYSICAL THERAPY ADULT  Goal: Performs mobility at highest level of function for planned discharge setting.  See evaluation for individualized goals.  Description: Treatment/Interventions: Functional transfer training, LE strengthening/ROM, Elevations, Therapeutic exercise, Endurance training, Equipment eval/education, Bed mobility, Gait training, Spoke to nursing, Spoke to advanced practitioner, OT  Equipment Recommended: Walker (at this time)       See flowsheet documentation for full assessment, interventions and recommendations.  Outcome: Adequate for Discharge  Note: Prognosis: Good  Problem List: Decreased strength, Decreased endurance, Impaired balance, Decreased mobility  Assessment: Pt demonstrated overall significant improvement in balance, endurance and all aspects of observed mobility progressing to mod (I) level on level surfaces (incl amb w/o AD) and on the steps; no overt uncorrected LOB, gross knee buckling, or swaying observed during the session; no increased discomfort or excessive fatigue expressed; HEP reviewed/performed; pt appeared to be comfortable at the end of session/reclined; overall, cont to anticipate pt will return home w/ available family support upon D/C from PT/mobility stand point and when medically cleared; no other immediate PT needs otherwise identified while in the hospital; pt informed and concurred; pt expressed no concerns about going home from mobility stand point, when medically cleared; will therefore sign off        Rehab Resource Intensity Level, PT: III (Minimum Resource Intensity)    See flowsheet documentation for full assessment.

## 2024-02-21 VITALS
WEIGHT: 133.82 LBS | RESPIRATION RATE: 16 BRPM | SYSTOLIC BLOOD PRESSURE: 103 MMHG | HEIGHT: 71 IN | HEART RATE: 85 BPM | BODY MASS INDEX: 18.73 KG/M2 | OXYGEN SATURATION: 99 % | DIASTOLIC BLOOD PRESSURE: 64 MMHG | TEMPERATURE: 97.3 F

## 2024-02-21 LAB
GLUCOSE SERPL-MCNC: 106 MG/DL (ref 65–140)
GLUCOSE SERPL-MCNC: 124 MG/DL (ref 65–140)
GLUCOSE SERPL-MCNC: 152 MG/DL (ref 65–140)

## 2024-02-21 PROCEDURE — 99024 POSTOP FOLLOW-UP VISIT: CPT | Performed by: PHYSICIAN ASSISTANT

## 2024-02-21 PROCEDURE — 82948 REAGENT STRIP/BLOOD GLUCOSE: CPT

## 2024-02-21 PROCEDURE — 99232 SBSQ HOSP IP/OBS MODERATE 35: CPT | Performed by: INTERNAL MEDICINE

## 2024-02-21 RX ORDER — POTASSIUM CHLORIDE 20 MEQ/1
20 TABLET, EXTENDED RELEASE ORAL DAILY
Qty: 30 TABLET | Refills: 1 | Status: SHIPPED | OUTPATIENT
Start: 2024-02-22 | End: 2024-03-03

## 2024-02-21 RX ORDER — LISINOPRIL 5 MG/1
5 TABLET ORAL DAILY
Qty: 30 TABLET | Refills: 1 | Status: ON HOLD | OUTPATIENT
Start: 2024-02-22

## 2024-02-21 RX ORDER — LANOLIN ALCOHOL/MO/W.PET/CERES
3 CREAM (GRAM) TOPICAL
Status: ON HOLD | COMMUNITY
Start: 2024-02-21

## 2024-02-21 RX ORDER — TORSEMIDE 20 MG/1
20 TABLET ORAL DAILY
Qty: 30 TABLET | Refills: 1 | Status: SHIPPED | OUTPATIENT
Start: 2024-02-22 | End: 2024-03-03

## 2024-02-21 RX ORDER — ASPIRIN 325 MG
325 TABLET ORAL DAILY
Qty: 30 TABLET | Refills: 1 | Status: ON HOLD | OUTPATIENT
Start: 2024-02-22

## 2024-02-21 RX ORDER — ACETAMINOPHEN 325 MG/1
650 TABLET ORAL
Status: ON HOLD | COMMUNITY
Start: 2024-02-21

## 2024-02-21 RX ORDER — DOCUSATE SODIUM 100 MG/1
100 CAPSULE, LIQUID FILLED ORAL 2 TIMES DAILY
Status: ON HOLD | COMMUNITY
Start: 2024-02-21

## 2024-02-21 RX ORDER — ATORVASTATIN CALCIUM 80 MG/1
80 TABLET, FILM COATED ORAL
Qty: 30 TABLET | Refills: 1 | Status: ON HOLD | OUTPATIENT
Start: 2024-02-21

## 2024-02-21 RX ORDER — OXYCODONE HYDROCHLORIDE 5 MG/1
TABLET ORAL
Qty: 30 TABLET | Refills: 0 | Status: ON HOLD | OUTPATIENT
Start: 2024-02-21

## 2024-02-21 RX ORDER — POLYETHYLENE GLYCOL 3350 17 G/17G
17 POWDER, FOR SOLUTION ORAL DAILY
Qty: 510 G | Refills: 0 | Status: SHIPPED | OUTPATIENT
Start: 2024-02-22 | End: 2024-03-03

## 2024-02-21 RX ORDER — PANTOPRAZOLE SODIUM 40 MG/1
40 TABLET, DELAYED RELEASE ORAL DAILY
Qty: 30 TABLET | Refills: 0 | Status: ON HOLD | OUTPATIENT
Start: 2024-02-22 | End: 2024-03-23

## 2024-02-21 RX ADMIN — ACETAMINOPHEN 650 MG: 325 TABLET, FILM COATED ORAL at 13:33

## 2024-02-21 RX ADMIN — AMIODARONE HYDROCHLORIDE 200 MG: 200 TABLET ORAL at 06:15

## 2024-02-21 RX ADMIN — INSULIN LISPRO 1 UNITS: 100 INJECTION, SOLUTION INTRAVENOUS; SUBCUTANEOUS at 11:14

## 2024-02-21 RX ADMIN — HEPARIN SODIUM 5000 UNITS: 5000 INJECTION INTRAVENOUS; SUBCUTANEOUS at 06:15

## 2024-02-21 RX ADMIN — ASPIRIN 325 MG ORAL TABLET 325 MG: 325 PILL ORAL at 08:39

## 2024-02-21 RX ADMIN — PANTOPRAZOLE SODIUM 40 MG: 40 TABLET, DELAYED RELEASE ORAL at 08:39

## 2024-02-21 RX ADMIN — METOPROLOL TARTRATE 25 MG: 25 TABLET, FILM COATED ORAL at 08:39

## 2024-02-21 RX ADMIN — LISINOPRIL 5 MG: 5 TABLET ORAL at 08:39

## 2024-02-21 RX ADMIN — DOCUSATE SODIUM 100 MG: 100 CAPSULE, LIQUID FILLED ORAL at 17:35

## 2024-02-21 RX ADMIN — POTASSIUM CHLORIDE 20 MEQ: 1500 TABLET, EXTENDED RELEASE ORAL at 08:39

## 2024-02-21 RX ADMIN — HEPARIN SODIUM 5000 UNITS: 5000 INJECTION INTRAVENOUS; SUBCUTANEOUS at 13:33

## 2024-02-21 RX ADMIN — ATORVASTATIN CALCIUM 80 MG: 80 TABLET, FILM COATED ORAL at 17:35

## 2024-02-21 RX ADMIN — TORSEMIDE 20 MG: 20 TABLET ORAL at 08:39

## 2024-02-21 RX ADMIN — AMIODARONE HYDROCHLORIDE 200 MG: 200 TABLET ORAL at 13:33

## 2024-02-21 RX ADMIN — ACETAMINOPHEN 650 MG: 325 TABLET, FILM COATED ORAL at 08:39

## 2024-02-21 NOTE — DISCHARGE INSTR - AVS FIRST PAGE
Instructions Following Open Heart Surgery      Protect your sternum (breast bone). Wires are placed during surgery to hold the sternum together. Do not lift anything heavier than 10 pounds for the first four weeks after surgery. (For example, a gallon of milk weighs 8 pounds) When you are seen for a routine postop check, you will be cleared to lift up to 25 lbs. Following three months from the time of surgery, you may lift without any restrictions.     Hug a pillow to your chest or cross your arms over your chest when you laugh, sneeze, or cough. You may resume sexual activity when you feel ready. Do not put any excessive pressure on your chest.    Be careful when you get into or out of a chair or bed.  Hug a pillow or cross your arms when you stand or sit. Do not twist as you move. Use only your legs to sit and stand. You may need a raised toilet seat if you have trouble standing up without using your arms.     No sleeping on side for the first 4 weeks. Limit daytime naps, to prevent difficulty sleeping at night.    Women: Wear a clean surgical bra every day.     Do not play sports that use your shoulder.  Examples include tennis and golf.    Do not drive until cleared by surgeon. Typically cleared to drive after one month, determination will be made at routine postop appointment. When a passenger in the car, wear a seat belt.    Continue you breathing exercises throughout the day with incentive spirometry    Activity: Your goal is to go on frequent walks, slowly increasing your activity level. Walking will help prevent leg swelling and prevent blood clots. When you start outpatient cardiac rehab in one month, you will be given additional instructions and a formal exercise plan. It is OK to go up steps, with help.     You may resume sexual activity when you are comfortable. Do not put excessive pressure on your chest.     Weigh yourself daily in the morning and keep of log of your weight. If your weight  increases more than 2 lbs per day or more than 5 lbs in a week, call your surgeon's office.    Keep your legs elevated when sitting. Pressure stockings may be worn to prevent significant leg swelling.     You may get routine vaccines any time after open heart surgery    Do not smoke:  Nicotine can damage blood vessels and make it more difficult to heal. Do not use e-cigarettes or smokeless tobacco in place of cigarettes or to help you quit. They still contain nicotine. Ask your primary care provider for information if you currently smoke and need help quitting.     Incision/Wound Care:    Shower daily. Gently wash your incision with warm water and mild soap. Do not scrub the area. Gently pat the area dry with a clean towel. If you have a bandage, dry the area and put on a new, clean bandage. Change your bandage at least daily, or if it gets wet or dirty. Always wash your hands before you care for your wound. Do not apply ointments, creams, lotions, powders, etc. If you develop signs of an infection (fever > 101, redness, warm skin, or drainage) please call your surgeon's office.     Do not pick at or touch puncture sites or incisions. Allow and scabs to come off on their own.     It is normal to have some drainage from the chest tube sites. Apply clean/dry dressings, until this resolves.    You may have discomfort or numbness along the incision for 3-4 weeks. It is normal to occasionally experience brief sharp shooing pains, tightness, or pulling sensations.    Do not take a bath or swim until cleared by surgeon.    As your incision heals, sometimes small tender lumps or pimple-like bumps develop which is due to your body attempting to “dissolve” the suture (stiches).     Call your local emergency number (493 in the [place]) or have someone call if:   You have squeezing, pressure, or pain in your chest or back, lightheadedness or sudden cold sweat  Short of breath that does not go away with rest  You cough up blood.  You  have a fast heartbeat that flutters. Or palpitations  You faint.  Signs of a stroke:  Numbness or drooping on one side of your face. Weakness in an arm or leg. Confusion or difficulty speaking. Dizziness, a severe headache, or vision loss    Call your surgeons office if:   You have bleeding that does not stop even after you apply pressure for 5 minutes.  You have redness, tenderness, or signs of infection at incision (pain, swelling, odor, or green/yellow discharge from incision site)  You have a severe headache.  You have a fever higher than 101°F (38.4°C).  You urinate less, or not at all.  You have persistent nausea, vomiting, or diarrhea  You hear persistent or worsening crunching or grinding in your sternum.  You have questions or concerns about your condition or care.      Diet:    Eat heart-healthy foods, low in unhealthy fats and sodium (salt). A heart healthy diet helps improve your cholesterol levels and lowers your risk for heart disease and stroke. You will receive formal education on healthy eating and label reading during your cardiac rehab in one month.   Choose foods that contain healthy fats, such as soybean, canola, olive, corn, and sunflower oils.  Limit unhealthy fats. Examples include:  Cholesterol  is found in animal foods, such as eggs and lobster, and in dairy products made from whole milk.    Saturated fat  is found in meats, such as melendez and hamburger. It is also found in chicken or turkey skin, whole milk, and butter.     Trans fat  is found in packaged foods, such as potato chips and cookies. It is also in some fried foods, and shortening. Do not eat foods that contain trans fats.  Get 20 to 30 grams of fiber each day.  Fruits, vegetables, whole-grain foods, and legumes (cooked beans) are good sources of fiber.  Low Sodium: Limit to 2,000 mg or less each day, unless otherwise directed. Choose low-sodium or no-salt-added foods. Add little or no salt to food you prepare. Use herbs and  spices in place of salt.  Foods high in sodium include frozen dinners, macaroni and cheese, instant potatoes, canned vegetables, cured or smoked meats, hot dogs, melendez and sausage, ketchup, barbecue sauce, salad dressing, pickles, olives, soy sauce, and miso.     Fluid Restriction: Fluid restriction after hospital discharge is advised. Limit your fluid intake to no more than 8 cups of liquid (8oz = 1cup) or 2000 mL per day.    Limit/Do not drink alcohol. Alcohol can damage heart and raise your blood pressure. The general recommended limit is 1 drink a day for women 21 or older and for men 65 or older. Do not have more than 3 drinks within 24 hours or 7 within a week. A drink of alcohol is 12 oz of beer, 5 oz of wine, or 1½ oz of liquor.        Narcotic Safety     What do I need to know about narcotic safety?    A narcotic is a type of medicine used to treat pain. When you are discharged from the hospital, you will be prescribed a narcotic called oxycodone. Pain control and management may help you rest, heal, and return to your daily activities. Do not take more than the recommended amount. Too much can cause a life-threatening overdose. Do not continue to take it after your pain stops.     How do I use narcotics safely?   Take prescribed narcotics exactly as directed.  You may develop tolerance. This means you keep needing higher doses to get the same effect. You may also develop narcotic use disorder. This means you are not able to control your narcotic use.  Do not give narcotics to others or take narcotics that belong to someone else.  Do not mix narcotics with other medicines or alcohol.  The combination can cause an overdose, or cause you to stop breathing.   Do not drive or operate heavy machinery after you use a narcotic.    Talk to your healthcare provider if you have any side effects.  Side effects include nausea, sleepiness, itching, and trouble thinking clearly.     What can I do to manage constipation?   Constipation is the most common side effect of narcotic medicine. Constipation is when you have hard, dry bowel movements, or you go longer than usual between bowel movements. The following are ways you can prevent or relieve constipation:  Drink liquids as allowed.  Drinking extra liquids will help soften and move your bowels. You should drink up to your maximum fluid allotment of 2 liters.    Eat high-fiber foods.  This may help decrease constipation by adding bulk to your bowel movements. High-fiber foods include fruits, vegetables, whole-grain breads and cereals, and beans  Supplements. You have been prescribed a fiber supplement called polyethylene glycol (Tiana lax) and a stool softener called docusate sodium (Colace). You should take these for as long as you continue narcotic medications.  Exercise regularly.  Regular physical activity can help stimulate your intestines. Walking is a good exercise to prevent or relieve constipation. Ask which exercises are best for you.    How do I store narcotics safely?   Store narcotics where others cannot easily get them.  Keep them in a locked cabinet or secure area. Do not  keep them in a purse or other bag you carry with you. A person may be looking for something else and find the narcotics.    Make sure narcotics are stored out of the reach of children.  A child can easily overdose on narcotics. Narcotics may look like candy to a small child.

## 2024-02-21 NOTE — CASE MANAGEMENT
Case Management Discharge Planning Note    Patient name Rohan Don  Location [facility] 412/[facility] 412-01 MRN [id number]  : 1960 Date 2024       Current Admission Date: 2024  Current Admission Diagnosis:S/P MVR (mitral valve repair)   Patient Active Problem List    Diagnosis Date Noted    S/P CABG x 2 02/15/2024    S/P MVR (mitral valve repair) 02/15/2024    Ischemic cardiomyopathy 02/15/2024    Severe left ventricular systolic dysfunction 02/15/2024    Nodule of upper lobe of right lung 2024    Renal calculi 2024    Coronary artery disease 2024    Severe protein-calorie malnutrition (HCC) 2024    Acute systolic congestive heart failure (HCC) 2024    Prediabetes 2024    DEISI (acute kidney injury) (HCC) 2024    Elevated transaminase level 2024    Pleural effusion 2024    HTN (hypertension) 2019    Vitamin D deficiency 2014      LOS (days): 12  Geometric Mean LOS (GMLOS) (days):   Days to GMLOS:     OBJECTIVE:  Risk of Unplanned Readmission Score: 14.8         Current admission status: Inpatient   Preferred Pharmacy:   [id number] - [place], [place] - [address] [place] [address]  Phone: [phone] Fax: [phone]    Primary Care Provider: No primary care provider on file.    Primary Insurance: [employer]EVIN [employer]ASQUEZ PENDING  Secondary Insurance:     DISCHARGE DETAILS:                                                                                                 Additional Comments: Primary diagnosis CHF, referal sent to HF clinical coordinator

## 2024-02-21 NOTE — DISCHARGE SUMMARY
Discharge Summary - Cardiothoracic Surgery   Rohan Don 63 y.o. male MRN: [id number]  Unit/Bed#: [facility] 412-01 Encounter: [id number]    Admission Date: 2/9/2024     Discharge Date: 02/21/24    Admitting Diagnosis: CHF (congestive heart failure) (HCC) [I50.9]  SOB (shortness of breath) [R06.02]    Primary Discharge Diagnosis:   Coronary artery disease, ischemic and non-ischemic cardiomyopathy, Functional mitral regurgitation S/P 1.  Intra-aortic balloon pump placement; 2. Coronary artery bypass grafting x2 with LIMA to LAD and SVG to RCA; 3. Mitral valve repair with 32 mm Herman Physio II ring annuloplasty.     Secondary Discharge Diagnosis:   HTN, prediabetes A1c 6.1, protein malnutrition (loss of 15 pounds unexpectedly). New dx acute systolic CHF, ischemic CMP with severe LV dysfunction EF 15-20%, incidental finding of RUL irregularity on CT chest, preop DEISI (resolved), moderate bilateral pleural effusions preop,     Attending: Stoney Shaw M.D.    Consulting Physician(s):   Cardiology  Medical critical care    Procedures Performed:   Procedure(s):  CORONARY ARTERY BYPASS GRAFT (CABG) X 2 VESSELS, SVG --> RCA, LIMA to LAD. MITRAL REPAIR WITH  32mm PHYSIO II MITRAL RING  INSERTION INTRA BALLOON PUMP AORTIC (IABP)     Hospital Course:   2/12: 63-year-old male recently moved from [place] to [place] to be closer to his daughter had 3 weeks of ongoing cough and SOB.  Initially treated as pneumonia without resolution of symptoms.  Outpatient echocardiogram with LVEF 20% and sent to the ER for evaluation.  Cardiac catheterization with MVCAD.  Cardiac surgery consultation.      2/13: CT chest/abd/pelvis completed, access for IABP. Cardiac MRI completed; Report pending     2/14: Cardiac MRI shows scarring of inferior wall, other territories completely viable. Preop testing ordered with vein mapping and carotid US completed and acceptable.  Consent signed, OR tomorrow for CABG/MV repair with IABP. Preop orders  placed.     2/15: CABG x 2 (LIMA to LAD, SVG to RCA) /MVrepair 32mm Physio II Ring w/ preop IABP placement.   Intraoperative blood products include: none.  No significant postoperative bleeding.  Transferred to ICU , supported with milrinone at 0.5, vaso at 0.08, epi at 2, levo at 4, IABP 1:1, SR with sinus arrhythmia vs wenkebach, HR 80s-90s (ST and T wave changes on EKG stable compared to preop). Wean towards extubation.      2/16: Remains intubated.  IABP 1:1. Levo 3, Epi 1, Milrinone 0.38.  +3L/24 hours.  Extubate today. Keep IABP at 1:1. Milrinone to 0.25 and leave.  Lasix prn to maintain net even. SVO2 74 (CCO not working well). PM: Extubated to NC. IABP at 1:1., started on cardene for low CI, now at 2.5, last CI 2.5. Given Lasix 40mg IV x1 for low UOP w/ response.     2/17: PA catheter replaced due to poor positioning. Weaned milrinone to 0.25 from 0.38. Able to wean off all vasopressors and initiated cardene for high SVR. Given lasix 40mg IV x2 with good UOP response. Primacor 0.25 mcg/kg/min and Cardene 2.5 mg/hour. IABP remains 1:1. Transfuse platelets and D/C IABP. Start Lopressor 12.5 PO BID. D/C pacing wires.      2/18: IABP discontinued 2/17/24 without complication. 1u platelets given for the procedure. Milrinone weaned to 0.13mcg without change in cardiac index. On/off cardene infusion. 24-beat run of self-limiting NSVT. Given 150mg IV amiodarone, 2g mag, 20meq KCL with resolution. One episode of nausea with vomiting overnight.  Primacor 0.13 mcg/kg/min. D/C pacing wires and chest tubes. Maintain ICU level of care. D/C jeter catheter.      2/19: Primacor off yest. Cardene off. Hgb 10.1, Cr 0.66, d/c swan, increase lopressor 25mg BID, cont lasix 40 IV daily. Transfer to floor. PM: Nutritional supplements changed, per dietician recommendations.     2/20: Hemodynamically stable. Start lisinopril 2.5 mg daily. Torsemide 20 mg daily. Limited echo for lifevest LVEF 20% cardiology paperwork completed. Home  tomorrow.     2/21: NSR on RA. Stable for d/c today once lifevest on.      Condition at Discharge:   good     Discharge Physical Exam:    Please see the documented physical exam from this morning's progress note for details.    Discharge Data:  Results from last 7 days   Lab Units 02/20/24 0428 02/19/24  0411 02/18/24  0406   WBC Thousand/uL 8.86 10.50* 15.02*   HEMOGLOBIN g/dL 9.8* 10.1* 11.6*   HEMATOCRIT % 31.6* 32.3* 36.2*   PLATELETS Thousands/uL 238 190 141*     Results from last 7 days   Lab Units 02/20/24  0428 02/19/24  0428 02/18/24  0406 02/15/24  1808 02/15/24  1353   POTASSIUM mmol/L 3.9 3.9 3.8   < >  --    CHLORIDE mmol/L 102 103 97   < >  --    CO2 mmol/L 29 26 33*   < >  --    CO2, I-STAT mmol/L  --   --   --   --  21   BUN mg/dL 24 27* 27*   < >  --    CREATININE mg/dL 0.65 0.66 0.78   < >  --    GLUCOSE, ISTAT mg/dl  --   --   --   --  100   CALCIUM mg/dL 8.2* 8.4 8.5   < >  --     < > = values in this interval not displayed.     Results from last 7 days   Lab Units 02/15/24  1348   INR  1.45*   PTT seconds 30       Discharge instructions/Information to patient and family:   See after visit summary for information provided to patient and family.      Rohan Don was educated on restrictions regarding driving and lifting, and techniques of proper incisional care.  They were specifically counselled on signs and symptoms of an incisional infection, and advised to contact our service immediately should they develop fevers, sweats, chill, redness or drainage at the site of any incisions.    Provisions for Follow-Up Care:  See after visit summary for information related to follow-up care and any pertinent home health orders.      Disposition:  Home    Planned Readmission:   No    Discharge Medications:  See after visit summary for reconciled discharge medications provided to patient and family.      Rohan Sanabriahanyjacquelyn was provided contact information and scheduled a follow up appointment with Stoney  HARRIET Shaw.  Additionally, follow up appointments have been scheduled for their primary care physician and primary cardiologist.  Contact information was provided.    Rohan Don was counseled on the importance of avoiding tobacco products.  As with all patients whom have undergone open heart surgery, tobacco cessation medication was contraindicated at the time of discharge.     ACE/ARB was Prescribed at discharge    Beta Blocker was Prescribed at discharge    Aspirin was Prescribed at discharge    Statin was Prescribed at discharge      The patient was discharged on ongoing diuretic therapy with Torsemide, 20 mg, PO QD and Potassium Chloride 20 mEq, PO QD.  They were advised to continue these medications, unless otherwise directed.     Narcotic pain medication was prescribed in the form of oxycodone.  Prior to prescribing, their prescription profile was reviewed on the [facility] of health prescription drug monitoring program.    The patient was informed that following their postoperative surgical evaluation, they will be referred to outpatient cardiac rehabilitation.  They were counseled that this program is run by specialists who will help them safely strengthen their heart and prevent more heart disease.  Cardiac rehabilitation will include exercise, relaxation, stress management, and heart-healthy nutrition.  Caregivers will also check to make sure their medication regimen is working.    During this admission, the patient was questioned on their use of tobacco, alcohol, and illicit/non-prescription drug use in the  previous 24 months. Rohan Don states that they have not used any of these substances in this time frame.    I spent 30 minutes discharging the patient. This time was spent on the day of discharge. I had direct contact with the patient on the day of discharge. Additional documentation is required if more than 30 minutes were spent on discharge.     SIGNATURE: Salima Maddox  VINICIUS  DATE: February 21, 2024  TIME: 12:40 PM

## 2024-02-21 NOTE — PROGRESS NOTES
Progress Note - Cardiothoracic Surgery   Rohan Don 63 y.o. male MRN: [id number]  Unit/Bed#: [facility] 412-01 Encounter: [id number]  Coronary artery disease, ischemic and non-ischemic cardiomyopathy, Functional mitral regurgitation    S/P Intra-aortic balloon pump placement; Coronary artery bypass grafting x2 Mitral valve repair  POD # 6    24 Hour Events: No events overnight.  No complaints.  Pain controlled.  + BM     Medications:   Scheduled Meds:  Current Facility-Administered Medications   Medication Dose Route Frequency Provider Last Rate    acetaminophen  650 mg Oral Q6H While awake [facility] Fabian, CRNP      amiodarone  200 [facility]8H [facility]      aspirin  325 mg Oral Daily [facility]      atorvastatin  80 mg Oral Daily With Dinner [facility] Fabian, CRNP      bisacodyl  10 mg Rectal Daily PRN [facility] Fabian, CRNP      docusate sodium  100 mg Oral BID [facility] Fabian, CRNP      heparin (porcine)  5,000 Units Subcutaneous Q8H [facility]      insulin lispro  1-5 Units Subcutaneous TID AC [facility]      insulin lispro  1-5 Units Subcutaneous HS [facility]      lisinopril  5 mg Oral Daily Alyssa Amezcua PA-C      melatonin  3 mg Oral HS [facility]      methocarbamol  500 mg Oral Q6H PRN Salima Maddox PA-C      metoprolol tartrate  25 mg Oral Q12H [facility] Salima Maddox PA-C      ondansetron  4 mg Intravenous Q6H PRN [facility] Fabian, CRNP      oxyCODONE  2.5 mg Oral Q4H PRN [facility]      Or    oxyCODONE  5 mg Oral Q4H PRN [facility] Kareemker, CRNP      pantoprazole  40 mg Oral Daily [facility]      polyethylene glycol  17 g Oral Daily [facility] Fabian, CRNP      potassium chloride  20 mEq Oral Daily [facility] Fabian, CRNP      temazepam  15 mg  Oral HS PRN OSCAR Drew      torsemide  20 mg Oral Daily Salima Maddox PA-C       Continuous Infusions:   PRN Meds:.  bisacodyl    methocarbamol    ondansetron    oxyCODONE **OR** oxyCODONE    temazepam    Vitals:   Vitals:    02/20/24 2236 02/21/24 0230 02/21/24 0600 02/21/24 0743   BP: 110/63 115/72  117/64   BP Location:  Left arm  Left arm   Pulse: 89 87  95   Resp: 18 18 18   Temp: 97.6 °F (36.4 °C) 97.6 °F (36.4 °C)  97.5 °F (36.4 °C)   TempSrc: Oral Oral  Oral   SpO2: 97% 96%  98%   Weight:   60.7 kg (133 lb 13.1 oz)    Height:           Telemetry: NSR; Heart Rate: 92    Respiratory:   SpO2: SpO2: 98 %; Room Air    Intake/Output:     Intake/Output Summary (Last 24 hours) at 2/21/2024 0917  Last data filed at 2/21/2024 0743  Gross per 24 hour   Intake 1376 ml   Output 1100 ml   Net 276 ml      Weights:   Weight (last 2 days)       Date/Time Weight    02/21/24 0600 60.7 (133.82)    02/20/24 1035 60.8 (134)    02/20/24 0644 61.2 (134.92)    02/19/24 0549 58.9 (129.85)              Results:   Results from last 7 days   Lab Units 02/20/24 0428 02/19/24  0411 02/18/24  0406   WBC Thousand/uL 8.86 10.50* 15.02*   HEMOGLOBIN g/dL 9.8* 10.1* 11.6*   HEMATOCRIT % 31.6* 32.3* 36.2*   PLATELETS Thousands/uL 238 190 141*     Results from last 7 days   Lab Units 02/20/24  0428 02/19/24  0428 02/18/24  0406 02/15/24  1808 02/15/24  1353   SODIUM mmol/L 138 135 136   < >  --    POTASSIUM mmol/L 3.9 3.9 3.8   < >  --    CHLORIDE mmol/L 102 103 97   < >  --    CO2 mmol/L 29 26 33*   < >  --    CO2, I-STAT mmol/L  --   --   --   --  21   BUN mg/dL 24 27* 27*   < >  --    CREATININE mg/dL 0.65 0.66 0.78   < >  --    GLUCOSE, ISTAT mg/dl  --   --   --   --  100   CALCIUM mg/dL 8.2* 8.4 8.5   < >  --     < > = values in this interval not displayed.     Results from last 7 days   Lab Units 02/15/24  1348   INR  1.45*   PTT seconds 30     Point of care glucose:     Studies:    Findings    Left  Ventricle Left ventricular cavity size is normal. Wall thickness is normal. The left ventricular ejection fraction is 25%. Systolic function is severely reduced. There is severe global hypokinesis with regional variation. Unable to assess diastolic function due to mitral valve repair or replacement. There is no thrombus.   Interventricular Septum There is abnormal septal motion consistent with post-operative status.   Right Ventricle Right ventricular cavity size is mildly dilated. Systolic function is moderately reduced. Wall thickness is normal.   Left Atrium The atrium is moderately dilated (42-48 mL/m2).   Right Atrium The atrium is mildly dilated.   Aortic Valve The aortic valve is trileaflet. The leaflets are not thickened. The leaflets are not calcified. The leaflets exhibit normal mobility. There is no evidence of regurgitation. The aortic valve has no significant stenosis.   Mitral Valve The valve has been repaired with an annular ring.  There is trace regurgitation. The gradient recorded across the repaired mitral valve is within the expected range at 2 mmHg.   Tricuspid Valve Tricuspid valve structure is normal. There is mild regurgitation. There is no evidence of stenosis. The right ventricular systolic pressure is mildly elevated assuming an RA pressure of 12 mmHg.  The estimated right ventricular systolic pressure is 35.00 mmHg.   Pulmonic Valve The pulmonic valve was not well visualized. There is trace regurgitation. There is no evidence of stenosis.   Ascending Aorta The aortic root is normal in size.   IVC/SVC The right atrial pressure is estimated at 12.0 mmHg.   Pericardium There is no pericardial effusion. The pericardium is normal in appearance. There is a left pleural effusion.       I have personally reviewed pertinent reports.      Invasive Lines/Tubes:  Invasive Devices       Central Venous Catheter Line  Duration             CVC Central Lines 02/15/24 6 days                    Physical  Exam:    General: No acute distress and Alert  HEENT/NECK:  Normocephalic. Atraumatic.  No jugular venous distention.    Cardiac: Regular rate and rhythm  Pulmonary:  Breath sounds clear bilaterally  Abdomen:  Non-tender, Non-distended, and Normal bowel sounds  Incisions: Sternum is stable.  Incision is clean, dry, and intact.  and Saphenectomy incison is clean, dry, and intact.   Extremities: Extremities warm/dry and No edema B/L  Neuro: Alert and oriented X 3  Skin: Warm/Dry, without rashes or lesions.    Assessment:  Principal Problem:    S/P MVR (mitral valve repair)  Active Problems:    Acute systolic congestive heart failure (HCC)    Prediabetes    DEISI (acute kidney injury) (HCC)    Elevated transaminase level    Vitamin D deficiency    HTN (hypertension)    Pleural effusion    Severe protein-calorie malnutrition (HCC)    Coronary artery disease    Nodule of upper lobe of right lung    Renal calculi    S/P CABG x 2    Ischemic cardiomyopathy    Severe left ventricular systolic dysfunction       Coronary artery disease, ischemic and non-ischemic cardiomyopathy, Functional mitral regurgitation    S/P Intra-aortic balloon pump placement; Coronary artery bypass grafting x2 Mitral valve repair  POD # 6    Plan:    Cardiac:   Lifevest per cardiology paperwork completed  ASA, statin  Metoprolol 12.5 mg bid  On lisinopril 5 mg daily  Continue prophylactic Amiodarone, 200 mg PO TID  Continue ASA and Statin therapy  Epicardial pacing wires have been removed    Central IV access no longer required; Remove central venous catheter today    Continue Subcutaneous Heparin for DVT prophylaxis    Pulmonary:     Good Room air oxygen saturation; Continue incentive spirometry/Coughing/Deep breathing exercises    Chest tubes have been discontinued    Renal:     Post op Creatinine stable; Follow up labs prn     Intake/Output net: +276 mL/24 hours    Diuretic Regimen:  Continue PO Torsemide, 20 mg QD  Continue Potassium Chloride 20  mEq PO QD    Neuro:    Neurologically intact; No active issues     GI:    Cardiac diet, with 1800 mL fluid restriction    Tolerating diet without complaint    Continue stool softeners and prn suppository    Continue GI prophylaxis    Endo: SSI coverage prn     7    Hematology:     Post-operative acute blood loss anemia; Hemoglobin 9.8; trend prn    8.   Disposition:        Anticipated discharge date: today once lifevest in place        VTE Pharmacologic Prophylaxis: Heparin  VTE Mechanical Prophylaxis: sequential compression device    Collaborative rounds completed with supervising physician  Plan of care discussed with bedside nurse    SIGNATURE: Salima Maddox PA-C  DATE: February 21, 2024  TIME: 9:17 AM

## 2024-02-21 NOTE — INCIDENTAL FINDINGS
The following findings require follow up:  Radiographic finding   Finding: right upper lobe mass 1.4 cm    Irregular non-mass-like density in the right upper lobe measuring 1.4 cm, with morphology suggesting an infectious/inflammatory etiology or scarring. However, malignancy cannot be excluded. PET/CT versus 3-month follow-up chest CT is recommended.      Follow up required: yes   Follow up should be done within 1 month(s)    Please notify the following clinician to assist with the follow up: PCP

## 2024-02-21 NOTE — PROGRESS NOTES
Advanced Heart Failure / Pulmonary Hypertension Service Progress Note    Rohan Don 63 y.o. male   MRN: [id number]  Unit/Bed#: [facility] 412-01; Encounter: [id number]    Assessment:  Principal Problem:    S/P MVR (mitral valve repair)  Active Problems:    Acute systolic congestive heart failure (HCC)    Prediabetes    DEISI (acute kidney injury) (HCC)    Elevated transaminase level    Vitamin D deficiency    HTN (hypertension)    Pleural effusion    Severe protein-calorie malnutrition (HCC)    Coronary artery disease    Nodule of upper lobe of right lung    Renal calculi    S/P CABG x 2    Ischemic cardiomyopathy    Severe left ventricular systolic dysfunction    Subjective:   Patient seen and examined. No significant events overnight. Feeling well. Denies SOB at rest, PND, and orthopnea. LifeVest to be fit this afternoon per patient.     Objective:   Intake/ Output: 1016 mL / 1450 mL.   Weight: 133 lbs (124 lbs on 02/20). Both standing.    Telemetry: NSR, rates in 70-90s.   MAPs: .    Today's Plan:  Recommend switching to metoprolol succinate prior to discharge.  Continue current lisinopril and torsemide dosing.   Continues on PO amiodarone and ASA per CT surgery.   Hemoglobin of 9.8 on 02/20 (16.5 mg/dL pre-operatively). Continue to monitor.   Lacks Rx coverage; KEVIN VASQUEZ pending per chart review.   Hospital follow-up scheduled for 02/29/2024.    Plan:  Coronary artery disease  S/p CAGB x2 (LIMA-LAD; SVG-RCA) with MV repair on 02/15/2024 by Dr. Snyder.   Continue on full dose aspirin, statin, and BB as below.    Chronic HFrEF; LVEF 20-25%; LVIDd 6 cm   Etiology: ischemic.    TTE 02/10/2024: LVEF 20%. LVIDd 6 cm. Grade 2 DD. Moderately reduced RVSF. RICHARD (L>R). Moderate to severe MR. Mild TR.    TTE (limited) 02/20/2024: LVEF 25%. LVIDd 4.8 cm. Moderately reduced RVSF. RICHARD (L>R). MV repair noted with gradient 2 mmHg. Mild TR. Left pleural effusion noted.     Pharmacotherapies / Neurohormonal  "Blockade:  --Beta Blocker: metoprolol tartrate 25 mg q12 hours.   --ARNi / ACEi / ARB: lisinopril 5 mg daily (Entresto cost-prohibitive, lacks Rx coverage).  --Aldosterone Antagonist: No.   --SGLT2 Inhibitor: No (SGLT2i cost-prohibitive; lacks Rx coverage).  --Home Diuretic: No.   --Inpatient Diuretic: torsemide 20 mg daily with potassium 20 mEq daily.     Sudden Cardiac Death Risk Reduction:  --LVEF 20-25%.    --Plan to reassess LVEF with limited TTE after 3-4 months of uninterrupted and optimized HF GDMT (earliest mid 05/2024).    Electrical Resynchronization:  --Candidacy for BiV device: narrow QRS.     Advanced Therapies (if appropriate): Will continue to monitor.    S/p mitral valve repair: at time of CABG in 02/2024.  Hypertension  Prediabetes  Protein-calorie malnutrition    Vitals:   Blood pressure 98/66, pulse 80, temperature 97.5 °F (36.4 °C), temperature source Oral, resp. rate 16, height 5' 11\" (1.803 m), weight 60.7 kg (133 lb 13.1 oz), SpO2 95%.    I/O last 3 completed shifts:  In: 1316 [P.O.:1316]  Out: 1750 [Urine:1750]    Weight (last 2 days)       Date/Time Weight    02/21/24 0600 60.7 (133.82)    02/20/24 1035 60.8 (134)    02/20/24 0644 61.2 (134.92)    02/19/24 0549 58.9 (129.85)          Wt Readings from Last 10 Encounters:   02/21/24 60.7 kg (133 lb 13.1 oz)     Vitals:    02/21/24 0230 02/21/24 0600 02/21/24 0743 02/21/24 1033   BP: 115/72  117/64 98/66   BP Location: Left arm  Left arm Left arm   Pulse: 87  95 80   Resp: 18  18 16   Temp: 97.6 °F (36.4 °C)  97.5 °F (36.4 °C) 97.5 °F (36.4 °C)   TempSrc: Oral  Oral Oral   SpO2: 96%  98% 95%   Weight:  60.7 kg (133 lb 13.1 oz)     Height:           Physical Exam  Vitals reviewed.   Constitutional:       General: He is awake. He is not in acute distress.     Appearance: Normal appearance. He is well-developed and underweight. He is not toxic-appearing or diaphoretic.   HENT:      Head: Normocephalic.      Nose: Nose normal.      Mouth/Throat: "      Mouth: Mucous membranes are moist.   Eyes:      General: No scleral icterus.     Conjunctiva/sclera: Conjunctivae normal.   Neck:      Vascular: No JVD.      Trachea: No tracheal deviation.   Cardiovascular:      Rate and Rhythm: Normal rate and regular rhythm. No extrasystoles are present.  Pulmonary:      Effort: Pulmonary effort is normal. No bradypnea or respiratory distress.      Breath sounds: Normal air entry.   Abdominal:      General: Bowel sounds are normal. There is no distension.      Palpations: Abdomen is soft.      Tenderness: There is no abdominal tenderness.   Musculoskeletal:      Cervical back: Neck supple.      Right lower leg: Edema (trace) present.      Left lower leg: Edema (trace) present.   Skin:     General: Skin is warm and dry.      Coloration: Skin is pale. Skin is not jaundiced.   Neurological:      General: No focal deficit present.      Mental Status: He is alert and oriented to person, place, and time.   Psychiatric:         Attention and Perception: Attention normal.         Mood and Affect: Mood and affect normal.         Speech: Speech normal.         Behavior: Behavior normal. Behavior is cooperative.         Thought Content: Thought content normal.        Lines/Drains/Airways       Active Status       Name Placement date Placement time Site Days    CVC Central Lines 02/15/24 02/15/24  0816  --  6                      Current Facility-Administered Medications:     acetaminophen (TYLENOL) tablet 650 mg, 650 mg, Oral, Q6H While awake, Va Peralta, RITIKANP, 650 mg at 02/21/24 0839    amiodarone tablet 200 mg, 200 mg, Oral, Q8H JENN, Va Gandaraemalinda, CRNP, 200 mg at 02/21/24 0615    aspirin tablet 325 mg, 325 mg, Oral, Daily, Va Peralta, CRNP, 325 mg at 02/21/24 0839    atorvastatin (LIPITOR) tablet 80 mg, 80 mg, Oral, Daily With Dinner, Va Peralta CRNP, 80 mg at 02/20/24 1703    bisacodyl (DULCOLAX) rectal suppository 10 mg, 10  mg, Rectal, Daily PRN, OSCAR Drew    docusate sodium (COLACE) capsule 100 mg, 100 mg, Oral, BID, OSCAR Drew, 100 mg at 02/20/24 1703    heparin (porcine) subcutaneous injection 5,000 Units, 5,000 Units, Subcutaneous, Q8H JENN, 5,000 Units at 02/21/24 0615 **AND** [CANCELED] Platelet count, , , Once, Judi Espinosa PA-C    insulin lispro (HumaLOG) 100 units/mL subcutaneous injection 1-5 Units, 1-5 Units, Subcutaneous, TID AC, 1 Units at 02/21/24 1114 **AND** Fingerstick Glucose (POCT), , , TID AC, OSCAR Drew    insulin lispro (HumaLOG) 100 units/mL subcutaneous injection 1-5 Units, 1-5 Units, Subcutaneous, HS, OSCAR Drew    lisinopril (ZESTRIL) tablet 5 mg, 5 mg, Oral, Daily, Alyssa Amezcua PA-C, 5 mg at 02/21/24 0839    melatonin tablet 3 mg, 3 mg, Oral, HS, OSCAR Drew, 3 mg at 02/20/24 2146    methocarbamol (ROBAXIN) tablet 500 mg, 500 mg, Oral, Q6H PRN, Salima Maddox PA-C    metoprolol tartrate (LOPRESSOR) tablet 25 mg, 25 mg, Oral, Q12H JENN, Salima Maddox PA-C, 25 mg at 02/21/24 0839    ondansetron (ZOFRAN) injection 4 mg, 4 mg, Intravenous, Q6H PRN, OSCAR Drew, 4 mg at 02/18/24 0242    oxyCODONE (ROXICODONE) split tablet 2.5 mg, 2.5 mg, Oral, Q4H PRN, 2.5 mg at 02/20/24 0626 **OR** oxyCODONE (ROXICODONE) IR tablet 5 mg, 5 mg, Oral, Q4H PRN, OSCAR Drew, 5 mg at 02/20/24 1356    pantoprazole (PROTONIX) EC tablet 40 mg, 40 mg, Oral, Daily, OSCAR Drew, 40 mg at 02/21/24 0839    polyethylene glycol (MIRALAX) packet 17 g, 17 g, Oral, Daily, OSCAR Drew, 17 g at 02/20/24 0928    potassium chloride (Klor-Con M20) CR tablet 20 mEq, 20 mEq, Oral, Daily, OSCAR Drew, 20 mEq at 02/21/24 0839    temazepam (RESTORIL) capsule 15 mg, 15 mg, Oral, HS PRN, OSCAR Drew    torsemide  (DEMADEX) tablet 20 mg, 20 mg, Oral, Daily, Salima Maddox PA-C, 20 mg at 02/21/24 0839    Labs & Results:      Results from last 7 days   Lab Units 02/20/24  0428 02/19/24  0411 02/18/24  0406   WBC Thousand/uL 8.86 10.50* 15.02*   HEMOGLOBIN g/dL 9.8* 10.1* 11.6*   HEMATOCRIT % 31.6* 32.3* 36.2*   PLATELETS Thousands/uL 238 190 141*         Results from last 7 days   Lab Units 02/20/24  0428 02/19/24  0428 02/18/24  0406 02/17/24  1727 02/17/24  0357 02/16/24  1226 02/16/24  0422 02/15/24  1808 02/15/24  1353 02/15/24  1348 02/15/24  1246 02/15/24  1147 02/15/24  0822 02/15/24  0533   POTASSIUM mmol/L 3.9 3.9 3.8   < > 4.0   < > 4.8  4.9   < >  --    < >  --   --   --  4.3   CHLORIDE mmol/L 102 103 97   < > 104   < > 109*  111*  --   --    < >  --   --   --  105   CO2 mmol/L 29 26 33*   < > 26   < > 22  22  --   --    < >  --   --   --  21   CO2, I-STAT mmol/L  --   --   --   --   --   --   --   --  21  --  22 26   < >  --    BUN mg/dL 24 27* 27*   < > 29*   < > 20  20  --   --    < >  --   --   --  25   CREATININE mg/dL 0.65 0.66 0.78   < > 1.19   < > 1.10  1.11  --   --    < >  --   --   --  0.90   CALCIUM mg/dL 8.2* 8.4 8.5   < > 8.5   < > 7.8*  8.1*  --   --    < >  --   --   --  9.5   ALK PHOS U/L  --   --   --   --  41  --  36  --   --   --   --   --   --  62   ALT U/L  --   --   --   --  28  --  48  --   --   --   --   --   --  81*   AST U/L  --   --   --   --  46*  --  57*  --   --   --   --   --   --  47*   GLUCOSE, ISTAT mg/dl  --   --   --   --   --   --   --   --  100  --  158* 247*   < >  --     < > = values in this interval not displayed.     Results from last 7 days   Lab Units 02/15/24  1348   INR  1.45*     Alyssa Amezcua PA-C

## 2024-02-22 ENCOUNTER — TELEPHONE (OUTPATIENT)
Dept: CARDIOLOGY CLINIC | Facility: CLINIC | Age: 64
End: 2024-02-22

## 2024-02-22 ENCOUNTER — HOME CARE VISIT (OUTPATIENT)
Dept: HOME HEALTH SERVICES | Facility: HOME HEALTHCARE | Age: 64
End: 2024-02-22
Payer: COMMERCIAL

## 2024-02-22 VITALS
RESPIRATION RATE: 16 BRPM | TEMPERATURE: 97.7 F | SYSTOLIC BLOOD PRESSURE: 138 MMHG | OXYGEN SATURATION: 96 % | HEART RATE: 92 BPM | DIASTOLIC BLOOD PRESSURE: 82 MMHG

## 2024-02-22 PROCEDURE — 400013 VN SOC

## 2024-02-22 PROCEDURE — G0299 HHS/HOSPICE OF RN EA 15 MIN: HCPCS

## 2024-02-22 NOTE — TELEPHONE ENCOUNTER
Symptoms:  -leg swelling []  -abdominal bloating, distension, pants fitting tighter []    -loss of appetite, feeling full sooner than usual []  -worsening shortness of breath/CELESTE, activity intolerance[]  -difficulty lying flat, waking up a night feeling breathless, using more pillows, sleeping in a recliner []  -palpitations []  -chest pain/pressure []  -new or worsening cough []  -unusual fatigue []    Comments:  Still has blle edema , but impeoving. Is up and walking around . Unsteady gait do to the leg pain and edema.      Weight:   -weighs self daily [x]  -dry/target weight _____  -today's weight ___131__  -understands what to do for rapid weight gain, ie 3lbs in 24 hours or 5 lbs in one week[x]    Comments:  Educated on when to call the office      Diet:   -consuming any high sodium foods (canned soups, lunch meats, fast food/take out, snack food, prepared foods, sport drinks) yes[]no[x]  - fluid consumed per day-      60-64     oz   -2g (2000 mg) Sodium, 2L (2000 ml, around 60-64 oz) fluid restriction] reinforced [x]      Comments:  They are working on the low sodium diet. Went over how to read a food label. And no added salt.       Medications:  -med list reviewed [x]   -missed doses or taking a different dose than prescribed?  yes[]no[x]  -still urinates well on current diuretic ? yes[x]no[]  -using O2 as directed? yes[]no[]    Comments:      Home vital signs: (if available)  BP ___138/82_  HR_92___  is drinking plenty of fluid.  Pulse ox___96_    Recent labs:   BMP/CMP -   BNP, NT Pro BNP-  CBC-    Device check: N/A  Arrhythmia burden ?     Optivol/Corvue crossed ?      Other possible triggers ?:  Recent viral symptoms/infection []  NSAID use []  Steroids []  Anemia []  COPD/hypoxia []  Not using CPAP/BiPAP []    Comments:Self-management/education and teach back:  Primary learner: self  Following low sodium diet: trying looking at that today.  Following fluid restriction:y  Hospital discharge weight: 133  lbs 13.1 oz  Weighing daily:    y        Recording:y  1st home weight 131 lbs      Weight today:131 lbs  Monitoring symptoms: y  Any current symptoms: slight edema blle  Knows when to call provider: educated on also made sure they are aware that we always have a doctor on call.  Medication reviewed and taking all as prescribed:y  Knows name of diuretic:y  Escalation plan: none  HF education reviewed/reinforced including low sodium diet, 64 oz fluid restriction, activity, symptoms of decompensation and when and who to call.     Care Coordination:  Aware of cardiology follow up appointment: y went over  Aware of PCP follow up appointment:  Transportation:y  Social Support: lives with wife  Insurance/financial concerns:n  Home health care: y  comuing 2 x's a week for now  Health literacy:fair  Engagement:good  Personal Goal: get better  Additional comments: will call pt tomorrow.

## 2024-02-22 NOTE — CASE COMMUNICATION
[facility] has admitted your patient to Home Health service with the following disciplines:      SN and PT  Response needed, please respond via In basket or tiger connect  Primary focus of home health care: Cardiac  Patient stated goals of care: Pain improved  Anticipated visit pattern: 1w1 2w2 1w1 and next visit date: 2/28/24 SP CABG with lifevest  Significant clinical findings: Patient would like an MSW to assist with disability v s care home decision. Please provide order for [facility] HH MSW.  Potential barriers to goal achievement: Patient agrees to get PCP within 2 weeks.    Thank you for allowing us to participate in the care of your patient.      Micky Marshall RN

## 2024-02-23 ENCOUNTER — HOME CARE VISIT (OUTPATIENT)
Dept: HOME HEALTH SERVICES | Facility: HOME HEALTHCARE | Age: 64
End: 2024-02-23
Payer: COMMERCIAL

## 2024-02-23 ENCOUNTER — TELEPHONE (OUTPATIENT)
Dept: CARDIOLOGY CLINIC | Facility: CLINIC | Age: 64
End: 2024-02-23

## 2024-02-23 ENCOUNTER — TELEPHONE (OUTPATIENT)
Dept: CARDIAC SURGERY | Facility: CLINIC | Age: 64
End: 2024-02-23

## 2024-02-23 PROCEDURE — G0151 HHCP-SERV OF PT,EA 15 MIN: HCPCS

## 2024-02-23 NOTE — TELEPHONE ENCOUNTER
Surgery: CABG x2 on 2/15/24. Discharged home on 24 with Torsemide 20 mg x 30 days.   Preop weight: 123 lbs  Postop weight (on discharge): 133 lbs in the hospital  Weight at home on discharge: 131.16 lbs     Patient called this morning stating that he lost 6 lbs between yesterday and today.   Weight this mornin.5 lbs.   Not lightheaded or dizzy. /86.     He already took his toresmide this morning. I instructed him to continue checking his weights, and to hold torsemide over the weekend. He has a 1 week accelerated follow up with our office on Monday, and we will reassess then. He verbalized understanding.

## 2024-02-23 NOTE — TELEPHONE ENCOUNTER
Spoke with pt. His wt today is 125.5 lbs. Down 5 lbs from yesterday. He was instucted by CTS to hold torsemide until after appt with them on Monday.  He will see HF on Tuesday next week.    Advised if he has rapid wt gain and/or worsening of CHF symptoms, he should call the office.  Verbally understood.

## 2024-02-26 ENCOUNTER — OFFICE VISIT (OUTPATIENT)
Dept: CARDIAC SURGERY | Facility: CLINIC | Age: 64
End: 2024-02-26

## 2024-02-26 ENCOUNTER — PATIENT OUTREACH (OUTPATIENT)
Dept: CARDIOLOGY CLINIC | Facility: CLINIC | Age: 64
End: 2024-02-26

## 2024-02-26 VITALS
DIASTOLIC BLOOD PRESSURE: 54 MMHG | BODY MASS INDEX: 17.39 KG/M2 | OXYGEN SATURATION: 97 % | WEIGHT: 124.2 LBS | SYSTOLIC BLOOD PRESSURE: 92 MMHG | HEART RATE: 93 BPM | HEIGHT: 71 IN

## 2024-02-26 DIAGNOSIS — Z95.1 S/P CABG X 2: ICD-10-CM

## 2024-02-26 DIAGNOSIS — Z98.890 S/P MVR (MITRAL VALVE REPAIR): ICD-10-CM

## 2024-02-26 DIAGNOSIS — Z48.89 ENCOUNTER FOR POST SURGICAL WOUND CHECK: ICD-10-CM

## 2024-02-26 DIAGNOSIS — Z48.89 ENCOUNTER FOR POSTOPERATIVE CARE: Primary | ICD-10-CM

## 2024-02-26 DIAGNOSIS — Z59.89 DOES NOT HAVE HEALTH INSURANCE: Primary | ICD-10-CM

## 2024-02-26 DIAGNOSIS — D62 ACUTE BLOOD LOSS AS CAUSE OF POSTOPERATIVE ANEMIA: ICD-10-CM

## 2024-02-26 PROBLEM — I50.22 CHRONIC HFREF (HEART FAILURE WITH REDUCED EJECTION FRACTION) (HCC): Chronic | Status: ACTIVE | Noted: 2024-02-10

## 2024-02-26 PROBLEM — I25.10 CORONARY ARTERY DISEASE: Chronic | Status: ACTIVE | Noted: 2024-02-12

## 2024-02-26 PROBLEM — I25.5 ISCHEMIC CARDIOMYOPATHY: Chronic | Status: ACTIVE | Noted: 2024-02-15

## 2024-02-26 PROBLEM — N17.9 AKI (ACUTE KIDNEY INJURY) (HCC): Status: RESOLVED | Noted: 2024-02-09 | Resolved: 2024-02-26

## 2024-02-26 PROCEDURE — 99024 POSTOP FOLLOW-UP VISIT: CPT | Performed by: NURSE PRACTITIONER

## 2024-02-26 PROCEDURE — G0180 MD CERTIFICATION HHA PATIENT: HCPCS | Performed by: THORACIC SURGERY (CARDIOTHORACIC VASCULAR SURGERY)

## 2024-02-26 RX ORDER — FERROUS SULFATE 324(65)MG
324 TABLET, DELAYED RELEASE (ENTERIC COATED) ORAL
Qty: 30 TABLET | Refills: 2 | Status: SHIPPED | OUTPATIENT
Start: 2024-02-26 | End: 2024-03-03

## 2024-02-26 RX ORDER — ASCORBIC ACID 500 MG
500 TABLET ORAL DAILY
Qty: 30 TABLET | Refills: 2 | Status: ON HOLD | OUTPATIENT
Start: 2024-02-26

## 2024-02-26 RX ORDER — ALBUTEROL SULFATE 90 UG/1
2 AEROSOL, METERED RESPIRATORY (INHALATION)
COMMUNITY
Start: 2024-01-29 | End: 2024-03-03

## 2024-02-26 SDOH — ECONOMIC STABILITY - INCOME SECURITY: OTHER PROBLEMS RELATED TO HOUSING AND ECONOMIC CIRCUMSTANCES: Z59.89

## 2024-02-26 NOTE — PROGRESS NOTES
" POST OP FOLLOW UP VISIT    Procedure:  2/15/24  1.  Intra-aortic balloon pump placement  2. Coronary artery bypass grafting x2 with LIMA to LAD and SVG to RCA  3. Mitral valve repair with 32 mm Herman Physio II ring annuloplasty.        History: Rohan Don is a 63 y.o. year old male who presents to our office today for a week follow up after hospital discharge s/p CABG & MV repair.  Patient is 11 days post op, discharged on 2/21/24.  He called our office on 2/23/24 due to significant weight loss in 24 hours. He was instructed to stop his Torsemide and Potassium until seen today.  Patient accompanied by his wife today and states he is doing well, tolerating activity without SOB, lightheadedness or dizziness. His appetite is slowly improving and he is eating well. He is still losing weight but not as rapidly as when he was on diuretic. He reports the usual issues common at this point in post op cardiac surgery recovery being: difficulty sleeping, surgical pain in his chest and left thigh  (vein harvest site bruising and hematoma) and altered body temp regulation, without fever or chills.  He is wearing his LIfeVest and has not received and shocks.  He is showering daily, cleansing incision with antibacterial soap and water. Denies any redness or drainage.     Vital Signs:   Vitals:    02/26/24 1339 02/26/24 1341   BP: 95/57 92/54   BP Location: Left arm Right arm   Patient Position: Sitting Sitting   Cuff Size: Standard Standard   Pulse: 93    SpO2: 97%    Weight: 56.3 kg (124 lb 3.2 oz)    Height: 5' 11\" (1.803 m)        Home Medications:   Prior to Admission medications    Medication Sig Start Date End Date Taking? Authorizing Provider   acetaminophen (TYLENOL) 325 mg tablet Take 2 tablets (650 mg total) by mouth every 6 (six) hours while awake 2/21/24  Yes Salima Maddox PA-C   albuterol (PROVENTIL HFA,VENTOLIN HFA) 90 mcg/act inhaler Inhale 2 puffs As needed 1/29/24  Yes Historical Provider, MD   aspirin " 325 mg tablet Take 1 tablet (325 mg total) by mouth daily 2/22/24  Yes Salima Maddox PA-C   atorvastatin (LIPITOR) 80 mg tablet Take 1 tablet (80 mg total) by mouth daily with dinner 2/21/24  Yes Salima Maddox PA-C   docusate sodium (COLACE) 100 mg capsule Take 1 capsule (100 mg total) by mouth 2 (two) times a day 2/21/24  Yes Salima Maddox PA-C   lisinopril (ZESTRIL) 5 mg tablet Take 1 tablet (5 mg total) by mouth daily 2/22/24  Yes Salima Maddox PA-C   melatonin 3 mg Take 1 tablet (3 mg total) by mouth daily at bedtime 2/21/24  Yes Salima Maddox PA-C   metoprolol tartrate (LOPRESSOR) 25 mg tablet Take 1 tablet (25 mg total) by mouth every 12 (twelve) hours 2/21/24  Yes Salima Maddox PA-C   oxyCODONE (ROXICODONE) 5 immediate release tablet Take 2.5 mg every 6 hours as needed for moderate pain or take 5 mg every 8 hours as needed for severe pain 2/21/24  Yes Salima Maddox PA-C   pantoprazole (PROTONIX) 40 mg tablet Take 1 tablet (40 mg total) by mouth daily 2/22/24 3/23/24 Yes Salima Maddox PA-C   polyethylene glycol (MIRALAX) 17 g packet Take 17 g by mouth daily 2/22/24 3/23/24 Yes Salima Maddox PA-C   potassium chloride (Klor-Con M20) 20 mEq tablet Take 1 tablet (20 mEq total) by mouth daily 2/22/24  Yes Salima Maddox PA-C   torsemide (DEMADEX) 20 mg tablet Take 1 tablet (20 mg total) by mouth daily 2/22/24  Yes Salima Maddox PA-C       Physical Exam:  General: Alert, oriented, frail, pale and thin with muscle wasting, no acute distress  HEENT/NECK:  PERRLA.  No jugular venous distention.    Cardiac:Regular rate and rhythm, no murmurs rubs or gallops.  Pulmonary:Breath sounds clear bilaterally  Abdomen:  Non-tender, Non-distended.  Positive bowel sounds.  Upper extremities: 2+ radial pulses; brisk capillary refill  Lower extremities: Extremities warm/dry. PT/DP pules 2+ bilaterally.  No edema B/L  Incisions: Sternum is stable.  Incision is clean,  dry, and intact.   Saphenectomy incision is clean, dry, and intact- hematoma and significant bruising left medial thigh  Musculoskeletal: MAEE, stable gait  Neuro: Alert and oriented X 3.  Sensation is grossly intact.  No focal deficits  Skin: Warm/Dry, without rashes or lesions.    Lab Results:     Results from last 7 days   Lab Units 02/20/24  0428   WBC Thousand/uL 8.86   HEMOGLOBIN g/dL 9.8*   HEMATOCRIT % 31.6*   PLATELETS Thousands/uL 238     Results from last 7 days   Lab Units 02/20/24  0428   POTASSIUM mmol/L 3.9   CHLORIDE mmol/L 102   CO2 mmol/L 29   BUN mg/dL 24   CREATININE mg/dL 0.65   CALCIUM mg/dL 8.2*         Lab Results   Component Value Date    HGBA1C 6.1 (H) 02/09/2024         Assessment:   Coronary artery disease, Mitral regurgitation, Ischemic CMP; HFrEF .   S/P coronary artery bypass grafting and mitral valve repair  Protein-Calorie malnutrition  QUYEN Don is 11 days post-op, making progress in his recovery.  Incisions are healing well- dry, no redness or drainage; sternum stable.     Weight decreasing off diuretic.    Plan:   Medications reviewed with patient, associated questions answered.  Ferrous Sulfate and Vit C ordered for APOBPA  Advised to remain off Diuretic at this point- Follow up with Cardiology tomorrow.  Supplement meals with protein drinks  Continue walking and light activity as tolerated.  IS frequently  Sternal precautions- no lifting more than 10 lb.  Next surgical f/u appt with surgeon on 3/22/24.  Maintain f/u with Cardiology, Pulmonary Med and PCP.    Patient and wife verbalize understanding of recommendations and all questions were answered to their satisfaction.    Colonoscopy to be addressed by PCP when patient acquires medical insurance coverage.    OSCAR Garcia  2/26/24  1:30PM

## 2024-02-26 NOTE — PROGRESS NOTES
"New referral - No insurance during recent hospitalization at [facility]. OP Advanced Heart Failure LCSW contacted  Financial Counselors for PATHS referral.     1st Outreach Call to patient post discharge.  Introduced self, role of OP Advanced Heart Failure  and reason for call. Pt participated well and completed SOC Psychosocial Assessment. Care Plan was also completed at this time.     Patient has Advance Directive and [employer] which is currently with his  being updated to [place] State laws. Pt moved from [place] in August 2023.   Pt is Faith and active with [facility]. Pt does not want any extraordinary life support if his \"mind is gone.\"    Pt awaits [facility] determination.   LCSW left message for PATHS.   Pt stated that he has medications, food and transportation.    LCSW plans outreach in a couple weeks.   "

## 2024-02-27 ENCOUNTER — OFFICE VISIT (OUTPATIENT)
Dept: CARDIOLOGY CLINIC | Facility: CLINIC | Age: 64
End: 2024-02-27

## 2024-02-27 ENCOUNTER — PATIENT OUTREACH (OUTPATIENT)
Dept: CARDIOLOGY CLINIC | Facility: CLINIC | Age: 64
End: 2024-02-27

## 2024-02-27 ENCOUNTER — HOME CARE VISIT (OUTPATIENT)
Dept: HOME HEALTH SERVICES | Facility: HOME HEALTHCARE | Age: 64
End: 2024-02-27
Payer: COMMERCIAL

## 2024-02-27 VITALS
OXYGEN SATURATION: 100 % | SYSTOLIC BLOOD PRESSURE: 96 MMHG | HEART RATE: 101 BPM | BODY MASS INDEX: 17.14 KG/M2 | DIASTOLIC BLOOD PRESSURE: 64 MMHG | WEIGHT: 122.9 LBS

## 2024-02-27 DIAGNOSIS — I25.10 CORONARY ARTERY DISEASE INVOLVING NATIVE CORONARY ARTERY OF NATIVE HEART WITHOUT ANGINA PECTORIS: Chronic | ICD-10-CM

## 2024-02-27 DIAGNOSIS — I50.22 CHRONIC HFREF (HEART FAILURE WITH REDUCED EJECTION FRACTION) (HCC): Chronic | ICD-10-CM

## 2024-02-27 DIAGNOSIS — Z09 HOSPITAL DISCHARGE FOLLOW-UP: Primary | ICD-10-CM

## 2024-02-27 DIAGNOSIS — I25.5 ISCHEMIC CARDIOMYOPATHY: Chronic | ICD-10-CM

## 2024-02-27 PROCEDURE — 99215 OFFICE O/P EST HI 40 MIN: CPT | Performed by: PHYSICIAN ASSISTANT

## 2024-02-27 NOTE — PROGRESS NOTES
Advanced Heart Failure / Pulmonary Hypertension Outpatient Progress Note    Rohan Don 63 y.o. male   MRN: [id number]  Encounter: [id number]    Assessment:  Patient Active Problem List    Diagnosis Date Noted    Chronic HFrEF (heart failure with reduced ejection fraction) (HCC) 02/10/2024    S/P CABG x 2 02/15/2024    S/P MVR (mitral valve repair) 02/15/2024    Ischemic cardiomyopathy 02/15/2024    Severe left ventricular systolic dysfunction 02/15/2024    Nodule of upper lobe of right lung 02/13/2024    Renal calculi 02/13/2024    Coronary artery disease 02/12/2024    Severe protein-calorie malnutrition (HCC) 02/11/2024    Prediabetes 02/09/2024    Elevated transaminase level 02/09/2024    Pleural effusion 02/09/2024    HTN (hypertension) 04/11/2019    Vitamin D deficiency 02/02/2014       Today's Plan:  Dry on exam. Continue hold of torsemide and potassium and encourage fluid intake.  BP in office today WNL of his MAPs during his recent hospitalization. Continue current HF GDMT.   Continue daily weights at home.    Check CMP, CBC, and BNP before next visit.   Advised to consider home testing for COVID.  Lacks Rx coverage; [facility] V[facility]Z pending per chart review.   Plan to reassess LVEF with limited TTE after 3-4 months of uninterrupted and optimized HF GDMT (earliest mid 05/2024).    Plan:  Coronary artery disease  S/p CAGB x2 (LIMA-LAD; SVG-RCA) with MV repair on 02/15/2024 by Dr. Snyder.              Continue on full dose aspirin, statin, and BB as below.     Chronic HFrEF; LVEF 20-25%; LVIDd 6 cm              Etiology: ischemic.               TTE 02/10/2024: LVEF 20%. LVIDd 6 cm. Grade 2 DD. Moderately reduced RVSF. RICHARD (L>R). Moderate to severe MR. Mild TR.               TTE (limited) 02/20/2024: LVEF 25%. LVIDd 4.8 cm. Moderately reduced RVSF. RICHARD (L>R). MV repair noted with gradient 2 mmHg. Mild TR. Left pleural effusion noted.    Weight of 133 lbs on 02/21 (day of discharge). Today, weighs 122 lbs.    Most  "recent BMP from 02/20/2024: sodium 138; potassium 3.9; BUN 24; creatinine 0.65; eGFR 104.    Pharmacotherapies / Neurohormonal Blockade:  --Beta Blocker: metoprolol tartrate 25 mg q12 hours.   --ARNi / ACEi / ARB: lisinopril 5 mg daily (Entresto cost-prohibitive, lacks Rx coverage).  --Aldosterone Antagonist: No.   --SGLT2 Inhibitor: No (SGLT2i cost-prohibitive; lacks Rx coverage).  --Diuretic:  Held.     Sudden Cardiac Death Risk Reduction:  --LVEF 20-25%. Wearing LifeVest.   --Plan to reassess LVEF with limited TTE after 3-4 months of uninterrupted and optimized HF GDMT (earliest mid 05/2024).     Electrical Resynchronization:  --Candidacy for BiV device: narrow QRS.      Advanced Therapies (if appropriate): Will continue to monitor.    Hypertension   BP of 96/64 mmHg in office today.   Continue on medications as above.     S/p mitral valve repair: at time of CABG in 02/2024.  Prediabetes  Acute blood anemia / iron deficiency anemia   Protein-calorie malnutrition  Eczema     HPI:   Rohan Don is a 63-year-old man with a PMH as above who presents to the office for hospital follow-up and to establish with outpatient practice.     Admitted to [facility] from 02/09 to 02/21/2024 after presenting with cough and SOB for ~10 days. BNP 2788. CXR with \"Moderate bilateral pleural effusions with bibasilar atelectasis and/or airspace disease noted.\" Outpatient TTE with LVEF 20% and moderate to severe MR. Started on IV diuretics, and cardiology consulted. [facility] on 02/12 with severe multivessel CAD. CT surgery consulted for CABG evaluation. Underwent CAGB x2 (LIMA-LAD; SVG-RCA) with MV repair on 02/15; left OR with IABP. IABP removed on 02/17 and pressors/inotropes off on 02/18. HF GDMT added as able (patient without Rx coverage). Transitioned to PO torsemide on 02/20. Lost 4 lbs this admission.     Advised by CT surgery to hold torsemide and potassium on 02/27 due to rapid weight loss since discharge.     02/27/2024: " "Patient presents with his wife for hospital follow-up. Down ~11 lbs since discharge. Reports vomiting earlier today with preceding nausea. Unsure if he vomited his AM medications. Did start on iron pills today. Denies constipation with PRN opioid use. Had 3-4 BMs today and currently holding stool softeners. Feeling LH and diaphoretic in office with intermittent \"tunneling vision.\" Continues with lesser appetite. Denies any sick contacts or other in home being ill. Denies fevers and URI symptoms. Wearing LifeVest; no issues.    Past Medical History:   Diagnosis Date    DEISI (acute kidney injury) ([facility])     Chronic HFrEF (heart failure with reduced ejection fraction) ([facility]) 2024    Coronary artery disease     Hypertension     Prediabetes     S/P CABG x 2 02/15/2024    S/P mitral valve repair 02/15/2024       Review of Systems   Constitutional:  Positive for appetite change, chills, diaphoresis and unexpected weight change. Negative for activity change and fever.   Respiratory:  Negative for cough, chest tightness and shortness of breath.    Cardiovascular:  Negative for chest pain, palpitations and leg swelling.   Gastrointestinal:  Positive for nausea. Negative for abdominal distention, abdominal pain, constipation and diarrhea.   Genitourinary:  Negative for decreased urine volume, dysuria and urgency.   Musculoskeletal: Negative.    Skin: Negative.    Neurological:  Positive for light-headedness. Negative for dizziness, syncope and weakness.   Psychiatric/Behavioral:  Negative for confusion and sleep disturbance. The patient is not nervous/anxious.      No Known Allergies      Current Outpatient Medications:     acetaminophen (TYLENOL) 325 mg tablet, Take 2 tablets (650 mg total) by mouth every 6 (six) hours while awake, Disp: , Rfl:     ascorbic acid (VITAMIN C) 500 MG tablet, Take 1 tablet (500 mg total) by mouth daily, Disp: 30 tablet, Rfl: 2    aspirin 325 mg tablet, Take 1 tablet (325 mg total) by mouth daily, " Disp: 30 tablet, Rfl: 1    atorvastatin (LIPITOR) 80 mg tablet, Take 1 tablet (80 mg total) by mouth daily with dinner, Disp: 30 tablet, Rfl: 1    docusate sodium (COLACE) 100 mg capsule, Take 1 capsule (100 mg total) by mouth 2 (two) times a day, Disp: , Rfl:     ferrous sulfate 324 (65 Fe) mg, Take 1 tablet (324 mg total) by mouth daily before breakfast, Disp: 30 tablet, Rfl: 2    lisinopril (ZESTRIL) 5 mg tablet, Take 1 tablet (5 mg total) by mouth daily, Disp: 30 tablet, Rfl: 1    melatonin 3 mg, Take 1 tablet (3 mg total) by mouth daily at bedtime, Disp: , Rfl:     metoprolol tartrate (LOPRESSOR) 25 mg tablet, Take 1 tablet (25 mg total) by mouth every 12 (twelve) hours, Disp: 60 tablet, Rfl: 2    oxyCODONE (ROXICODONE) 5 immediate release tablet, Take 2.5 mg every 6 hours as needed for moderate pain or take 5 mg every 8 hours as needed for severe pain, Disp: 30 tablet, Rfl: 0    pantoprazole (PROTONIX) 40 mg tablet, Take 1 tablet (40 mg total) by mouth daily, Disp: 30 tablet, Rfl: 0    albuterol (PROVENTIL HFA,VENTOLIN HFA) 90 mcg/act inhaler, Inhale 2 puffs As needed (Patient not taking: Reported on 2/27/2024), Disp: , Rfl:     polyethylene glycol (MIRALAX) 17 g packet, Take 17 g by mouth daily (Patient not taking: Reported on 2/27/2024), Disp: 510 g, Rfl: 0    potassium chloride (Klor-Con M20) 20 mEq tablet, Take 1 tablet (20 mEq total) by mouth daily (Patient not taking: Reported on 2/27/2024), Disp: 30 tablet, Rfl: 1    torsemide (DEMADEX) 20 mg tablet, Take 1 tablet (20 mg total) by mouth daily (Patient not taking: Reported on 2/27/2024), Disp: 30 tablet, Rfl: 1    Social History     Socioeconomic History    Marital status: /Civil Union     Spouse name: Not on file    Number of children: Not on file    Years of education: Not on file    Highest education level: Not on file   Occupational History    Not on file   Tobacco Use    Smoking status: Never    Smokeless tobacco: Never   Substance and Sexual  Activity    Alcohol use: Yes    Drug use: Not on file    Sexual activity: Not on file   Other Topics Concern    Not on file   Social History Narrative    Not on file     Social Determinants of Health     Financial Resource Strain: Not on file   Food Insecurity: No Food Insecurity (2/13/2024)    Hunger Vital Sign     Worried About Running Out of Food in the Last Year: Never true     Ran Out of Food in the Last Year: Never true   Transportation Needs: No Transportation Needs (2/13/2024)    PRAPARE - Transportation     Lack of Transportation (Medical): No     Lack of Transportation (Non-Medical): No   Physical Activity: Not on file   Stress: Not on file   Social Connections: Not on file   Intimate Partner Violence: Not on file   Housing Stability: Low Risk  (2/13/2024)    Housing Stability Vital Sign     Unable to Pay for Housing in the Last Year: No     Number of Places Lived in the Last Year: 1     Unstable Housing in the Last Year: No     No family history on file.    Vitals:   Blood pressure 96/64, pulse 101, weight 55.7 kg (122 lb 14.4 oz), SpO2 100%.    Wt Readings from Last 10 Encounters:   02/27/24 55.7 kg (122 lb 14.4 oz)   02/26/24 56.3 kg (124 lb 3.2 oz)   02/23/24 (P) 56.7 kg (125 lb)   02/21/24 60.7 kg (133 lb 13.1 oz)     Vitals:    02/27/24 1348   BP: 96/64   Pulse: 101   SpO2: 100%   Weight: 55.7 kg (122 lb 14.4 oz)       Physical Exam  Vitals reviewed.   Constitutional:       General: He is awake. He is not in acute distress.     Appearance: Normal appearance. He is well-developed and underweight. He is ill-appearing. He is not toxic-appearing or diaphoretic.   HENT:      Head: Normocephalic.      Nose: Nose normal.      Mouth/Throat:      Mouth: Mucous membranes are moist.   Eyes:      General: No scleral icterus.     Conjunctiva/sclera: Conjunctivae normal.   Neck:      Vascular: No JVD.      Trachea: No tracheal deviation.   Cardiovascular:      Rate and Rhythm: Regular rhythm. Tachycardia present.  "     Heart sounds: No murmur heard.     No friction rub.      Comments: Wearing LifeVest  Pulmonary:      Effort: Pulmonary effort is normal. No tachypnea, bradypnea or respiratory distress.      Breath sounds: Normal air entry. No decreased air movement. No wheezing or rhonchi.   Abdominal:      General: There is no distension.      Palpations: Abdomen is soft.      Tenderness: There is no abdominal tenderness.   Musculoskeletal:      Cervical back: Neck supple.      Right lower leg: No edema.      Left lower leg: No edema.   Skin:     General: Skin is warm and dry.      Coloration: Skin is pale. Skin is not cyanotic.   Neurological:      General: No focal deficit present.      Mental Status: He is alert and oriented to person, place, and time.   Psychiatric:         Attention and Perception: Attention normal.         Mood and Affect: Affect normal. Mood is anxious.         Speech: Speech normal.         Behavior: Behavior normal. Behavior is cooperative.         Thought Content: Thought content normal.       Labs & Results:  Lab Results   Component Value Date    WBC 8.86 02/20/2024    HGB 9.8 (L) 02/20/2024    HCT 31.6 (L) 02/20/2024    MCV 94 02/20/2024     02/20/2024     Lab Results   Component Value Date    SODIUM 138 02/20/2024    K 3.9 02/20/2024     02/20/2024    CO2 29 02/20/2024    BUN 24 02/20/2024    CREATININE 0.65 02/20/2024    GLUC 96 02/20/2024    CALCIUM 8.2 (L) 02/20/2024     Lab Results   Component Value Date    INR 1.45 (H) 02/15/2024    PROTIME 17.4 (H) 02/15/2024     No results found for: \"NTBNP\"   Lab Results   Component Value Date    BNP 2,788 (H) 02/09/2024      Alyssa Amezcua PA-C  "

## 2024-02-27 NOTE — PATIENT INSTRUCTIONS
Continue OFF torsemide and potassium.     Please weigh yourself every day (after emptying your bladder) and keep a detailed log of weights.   Contact the Heart Failure program at [phone] if you gain 3+ lbs overnight or 5+ lbs in 5-7 days.  Limit daily sodium/salt intake to 2000 mg daily to prevent fluid retention.  Avoid canned foods, fast food/Chinese food, and processed meats (hot dogs, lunch meat, and sausage etc.). Caution with condiments.  Limit fluid intake to 2000 mL or 2 liters (about 60-65 ounces) daily.  Avoid electrolyte replacement drinks (such as Gatorade, Pedialyte, Propel, Liquid IV, etc.).  Bring complete list of medications and log of daily weights to your follow-up appointment.

## 2024-02-27 NOTE — PROGRESS NOTES
Received return call from Lexii at [facility].  She stated that [place] application was submitted on 2/15/24. Pt/spouse turned in their paystubs and taxes. Lexii gave Employability Form to Gi Fofana CM to give to physician for completion.   Lexii  followed up with [employer] CM today who reported that from was given to MARGOT hSaw. Gi Fofana CM going to follow-up with [place].

## 2024-02-28 ENCOUNTER — HOME CARE VISIT (OUTPATIENT)
Dept: HOME HEALTH SERVICES | Facility: HOME HEALTHCARE | Age: 64
End: 2024-02-28
Payer: COMMERCIAL

## 2024-02-28 VITALS
RESPIRATION RATE: 18 BRPM | HEART RATE: 88 BPM | OXYGEN SATURATION: 98 % | SYSTOLIC BLOOD PRESSURE: 122 MMHG | DIASTOLIC BLOOD PRESSURE: 80 MMHG | TEMPERATURE: 97.7 F

## 2024-02-28 PROCEDURE — G0299 HHS/HOSPICE OF RN EA 15 MIN: HCPCS

## 2024-02-29 ENCOUNTER — TELEPHONE (OUTPATIENT)
Dept: CARDIOLOGY CLINIC | Facility: CLINIC | Age: 64
End: 2024-02-29

## 2024-02-29 ENCOUNTER — HOME CARE VISIT (OUTPATIENT)
Dept: HOME HEALTH SERVICES | Facility: HOME HEALTHCARE | Age: 64
End: 2024-02-29
Payer: COMMERCIAL

## 2024-02-29 NOTE — TELEPHONE ENCOUNTER
Spoke with pt. His wt I down to 118.8 lbs. He did have upset stomach 2 days ago and diarrhea yesterday.  Starting to feel better today . Appetite is starting to come back.    Denies any Symptoms of CHF.  He will call me with any cardiac issues or concerns.  Over the weekend advised if he has a problem he can call the office # and get answering service.  Verbally understood.

## 2024-03-03 ENCOUNTER — APPOINTMENT (EMERGENCY)
Dept: RADIOLOGY | Facility: HOSPITAL | Age: 64
DRG: 249 | End: 2024-03-03
Payer: COMMERCIAL

## 2024-03-03 ENCOUNTER — APPOINTMENT (INPATIENT)
Dept: RADIOLOGY | Facility: HOSPITAL | Age: 64
DRG: 249 | End: 2024-03-03
Payer: COMMERCIAL

## 2024-03-03 ENCOUNTER — HOSPITAL ENCOUNTER (INPATIENT)
Facility: HOSPITAL | Age: 64
LOS: 3 days | Discharge: HOME HEALTH | DRG: 249 | End: 2024-03-06
Attending: EMERGENCY MEDICINE | Admitting: FAMILY MEDICINE
Payer: COMMERCIAL

## 2024-03-03 DIAGNOSIS — R53.83 FATIGUE: ICD-10-CM

## 2024-03-03 DIAGNOSIS — R62.7 FAILURE TO THRIVE IN ADULT: Primary | ICD-10-CM

## 2024-03-03 DIAGNOSIS — E87.1 HYPONATREMIA: ICD-10-CM

## 2024-03-03 DIAGNOSIS — E83.42 HYPOMAGNESEMIA: ICD-10-CM

## 2024-03-03 DIAGNOSIS — R53.1 WEAKNESS: ICD-10-CM

## 2024-03-03 DIAGNOSIS — R19.7 DIARRHEA: ICD-10-CM

## 2024-03-03 LAB
2HR DELTA HS TROPONIN: -5 NG/L
ALBUMIN SERPL BCP-MCNC: 4.1 G/DL (ref 3.5–5)
ALP SERPL-CCNC: 107 U/L (ref 34–104)
ALT SERPL W P-5'-P-CCNC: 17 U/L (ref 7–52)
ANION GAP SERPL CALCULATED.3IONS-SCNC: 9 MMOL/L
APTT PPP: 30 SECONDS (ref 23–37)
AST SERPL W P-5'-P-CCNC: 17 U/L (ref 13–39)
ATRIAL RATE: 87 BPM
BASOPHILS # BLD AUTO: 0.08 THOUSANDS/ÂΜL (ref 0–0.1)
BASOPHILS NFR BLD AUTO: 1 % (ref 0–1)
BILIRUB SERPL-MCNC: 1 MG/DL (ref 0.2–1)
BUN SERPL-MCNC: 15 MG/DL (ref 5–25)
CALCIUM SERPL-MCNC: 9.9 MG/DL (ref 8.4–10.2)
CARDIAC TROPONIN I PNL SERPL HS: 23 NG/L
CARDIAC TROPONIN I PNL SERPL HS: 28 NG/L
CHLORIDE SERPL-SCNC: 100 MMOL/L (ref 96–108)
CO2 SERPL-SCNC: 24 MMOL/L (ref 21–32)
CREAT SERPL-MCNC: 0.88 MG/DL (ref 0.6–1.3)
CRP SERPL QL: 11.9 MG/L
EOSINOPHIL # BLD AUTO: 0.26 THOUSAND/ÂΜL (ref 0–0.61)
EOSINOPHIL NFR BLD AUTO: 2 % (ref 0–6)
ERYTHROCYTE [DISTWIDTH] IN BLOOD BY AUTOMATED COUNT: 13.1 % (ref 11.6–15.1)
ERYTHROCYTE [SEDIMENTATION RATE] IN BLOOD: 50 MM/HOUR (ref 0–19)
GFR SERPL CREATININE-BSD FRML MDRD: 92 ML/MIN/1.73SQ M
GLUCOSE SERPL-MCNC: 100 MG/DL (ref 65–140)
GLUCOSE SERPL-MCNC: 103 MG/DL (ref 65–140)
HCT VFR BLD AUTO: 40.7 % (ref 36.5–49.3)
HGB BLD-MCNC: 13 G/DL (ref 12–17)
IMM GRANULOCYTES # BLD AUTO: 0.05 THOUSAND/UL (ref 0–0.2)
IMM GRANULOCYTES NFR BLD AUTO: 0 % (ref 0–2)
INR PPP: 1.07 (ref 0.84–1.19)
LACTATE SERPL-SCNC: 1.2 MMOL/L (ref 0.5–2)
LIPASE SERPL-CCNC: 9 U/L (ref 11–82)
LYMPHOCYTES # BLD AUTO: 1.45 THOUSANDS/ÂΜL (ref 0.6–4.47)
LYMPHOCYTES NFR BLD AUTO: 12 % (ref 14–44)
MAGNESIUM SERPL-MCNC: 1.5 MG/DL (ref 1.9–2.7)
MCH RBC QN AUTO: 28.6 PG (ref 26.8–34.3)
MCHC RBC AUTO-ENTMCNC: 31.9 G/DL (ref 31.4–37.4)
MCV RBC AUTO: 90 FL (ref 82–98)
MONOCYTES # BLD AUTO: 1.19 THOUSAND/ÂΜL (ref 0.17–1.22)
MONOCYTES NFR BLD AUTO: 10 % (ref 4–12)
NEUTROPHILS # BLD AUTO: 9.28 THOUSANDS/ÂΜL (ref 1.85–7.62)
NEUTS SEG NFR BLD AUTO: 75 % (ref 43–75)
NRBC BLD AUTO-RTO: 0 /100 WBCS
P AXIS: 70 DEGREES
PHOSPHATE SERPL-MCNC: 3.1 MG/DL (ref 2.3–4.1)
PLATELET # BLD AUTO: 591 THOUSANDS/UL (ref 149–390)
PLATELET # BLD AUTO: 746 THOUSANDS/UL (ref 149–390)
PMV BLD AUTO: 8.3 FL (ref 8.9–12.7)
PMV BLD AUTO: 8.5 FL (ref 8.9–12.7)
POTASSIUM SERPL-SCNC: 3.6 MMOL/L (ref 3.5–5.3)
PR INTERVAL: 148 MS
PROCALCITONIN SERPL-MCNC: 0.06 NG/ML
PROT SERPL-MCNC: 8.1 G/DL (ref 6.4–8.4)
PROTHROMBIN TIME: 13.8 SECONDS (ref 11.6–14.5)
QRS AXIS: 109 DEGREES
QRSD INTERVAL: 106 MS
QT INTERVAL: 378 MS
QTC INTERVAL: 454 MS
RBC # BLD AUTO: 4.54 MILLION/UL (ref 3.88–5.62)
SODIUM SERPL-SCNC: 133 MMOL/L (ref 135–147)
T WAVE AXIS: -71 DEGREES
VENTRICULAR RATE: 87 BPM
WBC # BLD AUTO: 12.31 THOUSAND/UL (ref 4.31–10.16)

## 2024-03-03 PROCEDURE — 85730 THROMBOPLASTIN TIME PARTIAL: CPT

## 2024-03-03 PROCEDURE — 87493 C DIFF AMPLIFIED PROBE: CPT | Performed by: FAMILY MEDICINE

## 2024-03-03 PROCEDURE — 82948 REAGENT STRIP/BLOOD GLUCOSE: CPT

## 2024-03-03 PROCEDURE — 96374 THER/PROPH/DIAG INJ IV PUSH: CPT

## 2024-03-03 PROCEDURE — 86140 C-REACTIVE PROTEIN: CPT | Performed by: FAMILY MEDICINE

## 2024-03-03 PROCEDURE — 84145 PROCALCITONIN (PCT): CPT

## 2024-03-03 PROCEDURE — 81001 URINALYSIS AUTO W/SCOPE: CPT

## 2024-03-03 PROCEDURE — 71046 X-RAY EXAM CHEST 2 VIEWS: CPT

## 2024-03-03 PROCEDURE — 85025 COMPLETE CBC W/AUTO DIFF WBC: CPT

## 2024-03-03 PROCEDURE — 36415 COLL VENOUS BLD VENIPUNCTURE: CPT

## 2024-03-03 PROCEDURE — 74177 CT ABD & PELVIS W/CONTRAST: CPT

## 2024-03-03 PROCEDURE — 99285 EMERGENCY DEPT VISIT HI MDM: CPT

## 2024-03-03 PROCEDURE — 93010 ELECTROCARDIOGRAM REPORT: CPT | Performed by: INTERNAL MEDICINE

## 2024-03-03 PROCEDURE — 85610 PROTHROMBIN TIME: CPT

## 2024-03-03 PROCEDURE — 83605 ASSAY OF LACTIC ACID: CPT

## 2024-03-03 PROCEDURE — 87505 NFCT AGENT DETECTION GI: CPT | Performed by: FAMILY MEDICINE

## 2024-03-03 PROCEDURE — 85049 AUTOMATED PLATELET COUNT: CPT | Performed by: FAMILY MEDICINE

## 2024-03-03 PROCEDURE — 83735 ASSAY OF MAGNESIUM: CPT

## 2024-03-03 PROCEDURE — 93005 ELECTROCARDIOGRAM TRACING: CPT

## 2024-03-03 PROCEDURE — 80053 COMPREHEN METABOLIC PANEL: CPT

## 2024-03-03 PROCEDURE — 84100 ASSAY OF PHOSPHORUS: CPT

## 2024-03-03 PROCEDURE — 89055 LEUKOCYTE ASSESSMENT FECAL: CPT | Performed by: FAMILY MEDICINE

## 2024-03-03 PROCEDURE — 84484 ASSAY OF TROPONIN QUANT: CPT

## 2024-03-03 PROCEDURE — 83690 ASSAY OF LIPASE: CPT

## 2024-03-03 PROCEDURE — 87040 BLOOD CULTURE FOR BACTERIA: CPT

## 2024-03-03 PROCEDURE — 85652 RBC SED RATE AUTOMATED: CPT | Performed by: FAMILY MEDICINE

## 2024-03-03 PROCEDURE — 96361 HYDRATE IV INFUSION ADD-ON: CPT

## 2024-03-03 PROCEDURE — 99223 1ST HOSP IP/OBS HIGH 75: CPT | Performed by: FAMILY MEDICINE

## 2024-03-03 RX ORDER — LANOLIN ALCOHOL/MO/W.PET/CERES
3 CREAM (GRAM) TOPICAL
Status: DISCONTINUED | OUTPATIENT
Start: 2024-03-03 | End: 2024-03-06 | Stop reason: HOSPADM

## 2024-03-03 RX ORDER — PANTOPRAZOLE SODIUM 40 MG/1
40 TABLET, DELAYED RELEASE ORAL DAILY
Status: DISCONTINUED | OUTPATIENT
Start: 2024-03-04 | End: 2024-03-06 | Stop reason: HOSPADM

## 2024-03-03 RX ORDER — ONDANSETRON 2 MG/ML
4 INJECTION INTRAMUSCULAR; INTRAVENOUS ONCE
Status: COMPLETED | OUTPATIENT
Start: 2024-03-03 | End: 2024-03-03

## 2024-03-03 RX ORDER — ONDANSETRON 2 MG/ML
4 INJECTION INTRAMUSCULAR; INTRAVENOUS EVERY 6 HOURS PRN
Status: DISCONTINUED | OUTPATIENT
Start: 2024-03-03 | End: 2024-03-06 | Stop reason: HOSPADM

## 2024-03-03 RX ORDER — ASPIRIN 325 MG
325 TABLET ORAL DAILY
Status: DISCONTINUED | OUTPATIENT
Start: 2024-03-04 | End: 2024-03-06 | Stop reason: HOSPADM

## 2024-03-03 RX ORDER — ATORVASTATIN CALCIUM 80 MG/1
80 TABLET, FILM COATED ORAL
Status: DISCONTINUED | OUTPATIENT
Start: 2024-03-03 | End: 2024-03-06 | Stop reason: HOSPADM

## 2024-03-03 RX ORDER — METOCLOPRAMIDE HYDROCHLORIDE 5 MG/ML
10 INJECTION INTRAMUSCULAR; INTRAVENOUS EVERY 6 HOURS PRN
Status: DISCONTINUED | OUTPATIENT
Start: 2024-03-03 | End: 2024-03-04

## 2024-03-03 RX ORDER — MAGNESIUM SULFATE HEPTAHYDRATE 40 MG/ML
2 INJECTION, SOLUTION INTRAVENOUS ONCE
Status: COMPLETED | OUTPATIENT
Start: 2024-03-03 | End: 2024-03-03

## 2024-03-03 RX ORDER — HEPARIN SODIUM 5000 [USP'U]/ML
5000 INJECTION, SOLUTION INTRAVENOUS; SUBCUTANEOUS EVERY 8 HOURS SCHEDULED
Status: DISCONTINUED | OUTPATIENT
Start: 2024-03-03 | End: 2024-03-06 | Stop reason: HOSPADM

## 2024-03-03 RX ADMIN — METOCLOPRAMIDE 10 MG: 5 INJECTION, SOLUTION INTRAMUSCULAR; INTRAVENOUS at 17:36

## 2024-03-03 RX ADMIN — MELATONIN 3 MG: at 21:01

## 2024-03-03 RX ADMIN — ATORVASTATIN CALCIUM 80 MG: 80 TABLET, FILM COATED ORAL at 17:13

## 2024-03-03 RX ADMIN — ONDANSETRON 4 MG: 2 INJECTION INTRAMUSCULAR; INTRAVENOUS at 11:58

## 2024-03-03 RX ADMIN — IOHEXOL 100 ML: 350 INJECTION, SOLUTION INTRAVENOUS at 18:54

## 2024-03-03 RX ADMIN — SODIUM CHLORIDE 500 ML: 0.9 INJECTION, SOLUTION INTRAVENOUS at 11:58

## 2024-03-03 RX ADMIN — MAGNESIUM SULFATE HEPTAHYDRATE 2 G: 40 INJECTION, SOLUTION INTRAVENOUS at 14:30

## 2024-03-03 RX ADMIN — HEPARIN SODIUM 5000 UNITS: 5000 INJECTION INTRAVENOUS; SUBCUTANEOUS at 17:13

## 2024-03-03 RX ADMIN — HEPARIN SODIUM 5000 UNITS: 5000 INJECTION INTRAVENOUS; SUBCUTANEOUS at 21:01

## 2024-03-03 NOTE — H&P
[facility]  H&P  Name: Rohan Don 63 y.o. male I MRN: [id number]  Unit/Bed#: [facility] I Date of Admission: 3/3/2024   Date of Service: 3/3/2024 I Hospital Day: 0      Assessment/Plan   * Diarrhea  Assessment & Plan  Patient reports of ongoing diarrhea requiring Imodium since he had CABG 3 weeks ago ago. Has anorexia. Lost 15 lbs in 1 month.   No fever, abdominal pain associated. No melena/hematochezia.  However reported poor oral intake at home up to 1000 [place] daily, associated with watery stool.  WBC 12.  Lipase 9.  Obtain Stool enteric PCR, C-diff, ESR/CRP, fecal calprotectin.  Obtain CT Abd/pelvis with PO/IV contrast.       Hypomagnesemia  Assessment & Plan  Repleted in ED. F/u labs    S/P CABG x 2  Assessment & Plan  S/P CABG to RCA, LIMA to LAD on 2/15/24  Continue full dose of aspirin, statin, metoprolol. Holding lisinopril for low BP.      Severe protein-calorie malnutrition (HCC)  Assessment & Plan  Malnutrition Findings:                                 BMI Findings:           Body mass index is 16.21 kg/m².   \  Secondary to diarrhea and anorexia.   Lost 15 lbs   Nutrition consult    HTN (hypertension)  Assessment & Plan  Low BP in ED.  Hold home lisinopril.  Continue PO Metoprolol with holding parameters    Chronic HFrEF (heart failure with reduced ejection fraction) (HCC)  Assessment & Plan  Wt Readings from Last 3 Encounters:   03/03/24 52.7 kg (116 lb 3.2 oz)   02/27/24 55.7 kg (122 lb 14.4 oz)   02/26/24 56.3 kg (124 lb 3.2 oz)     Last Echo showed EF 25%. Currently wearing lifevest.  Patient was taken off of diuretics by Heart failure team OP.  Follows with Dr Dandre Cummins.   Fluid restriction.                 VTE Prophylaxis: Heparin  / sequential compression device   Code Status: full code   Discussion with family: wife and neighbor present at bedside    Anticipated Length of Stay:  Patient will be admitted on an Inpatient basis with an anticipated length of stay  of  > 2 midnights.   Justification for Hospital Stay: Failure to thrive, ongoing diarrhea    Total Time for Visit, including Counseling / Coordination of Care: Yes.  Greater than 50% of this total time spent on direct patient counseling and coordination of care.    Chief Complaint:   diarrhea, failure to thrive    History of Present Illness:    Rohan Don is a 63 y.o. male with PMH of chronic systolic CHF, ischemic cardiomyopathy, EF of 25% on LifeVest, status post CABG x 2 3 weeks ago, presented to ED with complaint of ongoing diarrhea, lack of appetite, and loss of 15 pounds in the last 3 weeks since the CABG.  Patient denies any associated fever or chills or abdominal pain.  Denies any melena or hematochezia nausea or vomiting.  However reported poor oral intake at home up to 1000 [place] daily, associated with watery stool.  He denies any chest pain, dyspnea, orthopnea, leg swelling.  In ER, labs largely unremarkable.  Mildly elevated WBC at 12.  Lipase normal.  Normal lactic acid and procalcitonin.  Patient will be admitted for investigation for diarrhea, failure to thrive.   Patient also noted to be hypotensive upon arrival responded well to IVF.    Review of Systems:    Review of Systems   Constitutional:  Positive for appetite change and unexpected weight change. Negative for chills and fever.   HENT:  Negative for ear pain and sore throat.    Eyes:  Negative for pain and visual disturbance.   Respiratory:  Negative for cough and shortness of breath.    Cardiovascular:  Negative for chest pain and palpitations.   Gastrointestinal:  Positive for diarrhea. Negative for abdominal pain and vomiting.   Genitourinary:  Negative for dysuria and hematuria.   Musculoskeletal:  Negative for arthralgias and back pain.   Skin:  Negative for color change and rash.   Neurological:  Negative for seizures and syncope.   All other systems reviewed and are negative.      Past Medical and Surgical History:     Past Medical  History:   Diagnosis Date    DEISI (acute kidney injury) ([facility])     Chronic HFrEF (heart failure with reduced ejection fraction) ([facility]) 2024    Coronary artery disease     Hypertension     Prediabetes     S/P CABG x 2 02/15/2024    S/P mitral valve repair 02/15/2024       Past Surgical History:   Procedure Laterality Date    CARDIAC CATHETERIZATION N/A 2/12/2024    Procedure: Cardiac catheterization;  Surgeon: Shanon Pal DO;  Location: [facility]ARD[facility];  Service: Cardiology    CARDIAC CATHETERIZATION Left 2/12/2024    Procedure: Cardiac Left Heart Cath;  Surgeon: Shanon Pal DO;  Location: [facility];  Service: Cardiology    CARDIAC CATHETERIZATION N/A 2/12/2024    Procedure: Cardiac Coronary Angiogram;  Surgeon: Shanon Pal DO;  Location: [facility];  Service: Cardiology    ID CORONARY ARTERY BYP W/VEIN & ARTERY GRAFT 2 VEIN N/A 2/15/2024    Procedure: CORONARY ARTERY BYPASS GRAFT (CABG) X 2 VESSELS, SVG --> RCA, LIMA to LAD. MITRAL REPAIR WITH  32mm PHYSIO II MITRAL RING;  Surgeon: AGNES Shaw MD;  Location: [facility];  Service: Cardiac Surgery    ID INSERTION INTRA-AORTIC BALLOON ASSIST DEV PERQ Right 2/15/2024    Procedure: INSERTION INTRA BALLOON PUMP AORTIC (IABP);  Surgeon: AGNES Shaw MD;  Location: [facility];  Service: Cardiac Surgery       Meds/Allergies:    Prior to Admission medications    Medication Sig Start Date End Date Taking? Authorizing Provider   acetaminophen (TYLENOL) 325 mg tablet Take 2 tablets (650 mg total) by mouth every 6 (six) hours while awake 2/21/24   Salima Maddox PA-C   ascorbic acid (VITAMIN C) 500 MG tablet Take 1 tablet (500 mg total) by mouth daily 2/26/24   OSCAR Garcia   aspirin 325 mg tablet Take 1 tablet (325 mg total) by mouth daily 2/22/24   Salima Maddox PA-C   atorvastatin (LIPITOR) 80 mg tablet Take 1 tablet (80 mg total) by mouth daily with dinner 2/21/24   Salima Maddox PA-C    docusate sodium (COLACE) 100 mg capsule Take 1 capsule (100 mg total) by mouth 2 (two) times a day 2/21/24   Salima Maddox PA-C   lisinopril (ZESTRIL) 5 mg tablet Take 1 tablet (5 mg total) by mouth daily 2/22/24   Salima Maddox PA-C   melatonin 3 mg Take 1 tablet (3 mg total) by mouth daily at bedtime 2/21/24   Salima Maddox PA-C   metoprolol tartrate (LOPRESSOR) 25 mg tablet Take 1 tablet (25 mg total) by mouth every 12 (twelve) hours 2/21/24   Salima Maddox PA-C   oxyCODONE (ROXICODONE) 5 immediate release tablet Take 2.5 mg every 6 hours as needed for moderate pain or take 5 mg every 8 hours as needed for severe pain 2/21/24   Salima Maddox PA-C   pantoprazole (PROTONIX) 40 mg tablet Take 1 tablet (40 mg total) by mouth daily 2/22/24 3/23/24  Salima Maddox PA-C   albuterol (PROVENTIL HFA,VENTOLIN HFA) 90 mcg/act inhaler Inhale 2 puffs As needed  Patient not taking: Reported on 2/27/2024 1/29/24 3/3/24  Historical Provider, MD   ferrous sulfate 324 (65 Fe) mg Take 1 tablet (324 mg total) by mouth daily before breakfast 2/26/24 3/3/24  OSCAR Garcia   polyethylene glycol (MIRALAX) 17 g packet Take 17 g by mouth daily  Patient not taking: Reported on 2/27/2024 2/22/24 3/3/24  Salima Maddox PA-C   potassium chloride (Klor-Con M20) 20 mEq tablet Take 1 tablet (20 mEq total) by mouth daily  Patient not taking: Reported on 2/27/2024 2/22/24 3/3/24  Salima Maddox PA-C   torsemide (DEMADEX) 20 mg tablet Take 1 tablet (20 mg total) by mouth daily  Patient not taking: Reported on 2/27/2024 2/22/24 3/3/24  Salima Maddox PA-C     I have reviewed home medications using allscripts.    Allergies: No Known Allergies    Social History:     Marital Status: /Civil Union      Substance Use History:   Social History     Substance and Sexual Activity   Alcohol Use Not Currently     Social History     Tobacco Use   Smoking Status Never   Smokeless Tobacco Never      Social History     Substance and Sexual Activity   Drug Use Never       Family History:    History reviewed. No pertinent family history.    Physical Exam:     Vitals:   Blood Pressure: 102/57 (03/03/24 1430)  Pulse: 90 (03/03/24 1430)  Temperature: (!) 96.9 °F (36.1 °C) (03/03/24 1058)  Temp Source: Axillary (03/03/24 1058)  Respirations: (!) 30 (03/03/24 1430)  Weight - Scale: 52.7 kg (116 lb 3.2 oz) (03/03/24 1103)  SpO2: 97 % (03/03/24 1430)    Physical Exam  Vitals and nursing note reviewed.   Constitutional:       General: He is not in acute distress.     Appearance: He is well-developed and underweight. He is ill-appearing.   HENT:      Head: Normocephalic and atraumatic.   Eyes:      Conjunctiva/sclera: Conjunctivae normal.   Cardiovascular:      Rate and Rhythm: Normal rate and regular rhythm.      Heart sounds: No murmur heard.  Pulmonary:      Effort: Pulmonary effort is normal. No respiratory distress.      Breath sounds: Normal breath sounds.   Abdominal:      Palpations: Abdomen is soft.      Tenderness: There is no abdominal tenderness.   Musculoskeletal:         General: No swelling.      Cervical back: Neck supple.      Right lower leg: No edema.      Left lower leg: No edema.   Skin:     General: Skin is warm and dry.      Capillary Refill: Capillary refill takes less than 2 seconds.   Neurological:      Mental Status: He is alert.   Psychiatric:         Mood and Affect: Mood normal.            Additional Data:     Lab Results: I have personally reviewed pertinent reports.      Results from last 7 days   Lab Units 03/03/24  1155   WBC Thousand/uL 12.31*   HEMOGLOBIN g/dL 13.0   HEMATOCRIT % 40.7   PLATELETS Thousands/uL 746*   NEUTROS PCT % 75   LYMPHS PCT % 12*   MONOS PCT % 10   EOS PCT % 2     Results from last 7 days   Lab Units 03/03/24  1155   SODIUM mmol/L 133*   POTASSIUM mmol/L 3.6   CHLORIDE mmol/L 100   CO2 mmol/L 24   BUN mg/dL 15   CREATININE mg/dL 0.88   ANION GAP mmol/L 9    CALCIUM mg/dL 9.9   ALBUMIN g/dL 4.1   TOTAL BILIRUBIN mg/dL 1.00   ALK PHOS U/L 107*   ALT U/L 17   AST U/L 17   GLUCOSE RANDOM mg/dL 100     Results from last 7 days   Lab Units 03/03/24  1155   INR  1.07     Results from last 7 days   Lab Units 03/03/24  1106   POC GLUCOSE mg/dl 103         Results from last 7 days   Lab Units 03/03/24  1155   LACTIC ACID mmol/L 1.2   PROCALCITONIN ng/ml 0.06       Imaging: I have personally reviewed pertinent reports.      XR chest pa & lateral   ED Interpretation by Ernst Montes DO (03/03 1305)   No acute cardiopulmonary pathology           EKG, Pathology, and Other Studies Reviewed on Admission:   EKG: sinus rhythm with occasional PVC    Allscripts / Epic Records Reviewed: Yes     ** Please Note: This note has been constructed using a voice recognition system. **

## 2024-03-03 NOTE — ASSESSMENT & PLAN NOTE
S/P CABG to RCA, LIMA to LAD on 2/15/24  Continue full dose of aspirin, statin, metoprolol. Holding lisinopril for low BP.

## 2024-03-03 NOTE — ASSESSMENT & PLAN NOTE
Malnutrition Findings:                                 BMI Findings:           Body mass index is 16.21 kg/m².   \  Secondary to diarrhea and anorexia.   Lost 15 lbs   Nutrition consult

## 2024-03-03 NOTE — ASSESSMENT & PLAN NOTE
Wt Readings from Last 3 Encounters:   03/03/24 52.7 kg (116 lb 3.2 oz)   02/27/24 55.7 kg (122 lb 14.4 oz)   02/26/24 56.3 kg (124 lb 3.2 oz)     Last Echo showed EF 25%. Currently wearing lifevest.  Patient was taken off of diuretics by Heart failure team OP.  Follows with Dr Dandre Cummins.   Fluid restriction.

## 2024-03-03 NOTE — ED PROVIDER NOTES
History  Chief Complaint   Patient presents with    Fatigue     Patient with decreased appetite and 15lb weight loss in the past 10 days. Patient discharged recently from hospital stay d/t heart failure. Patient presents wearing lifevest.     63-year-old male with past medical history of CHF, coronary artery disease, mitral regurg, hypertension, prediabetes, protein malnutrition, presents emergency department complaining of weakness and failure to thrive after being discharged from the hospital on 2/21/2024.  He states he has been unable to gain weight and has lost appetite.  He is lost approximately 15 pounds since that point.  Patient was admitted for coronary artery bypass of 2 vessels, mitral repair, as well as intra-aortic balloon pump.  Echocardiogram performed during hospitalization showed a left ventricular ejection fraction of 20% patient was placed on LifeVest.  She has been having insurance problems which has made it difficult for continuity of care.  His biggest complaint is generalized weakness and occasional dyspnea on exertion.  He denies chest pain or shortness of breath, denies belly pain and vomiting.  Endorses nausea.        Prior to Admission Medications   Prescriptions Last Dose Informant Patient Reported? Taking?   acetaminophen (TYLENOL) 325 mg tablet  Self No No   Sig: Take 2 tablets (650 mg total) by mouth every 6 (six) hours while awake   ascorbic acid (VITAMIN C) 500 MG tablet   No No   Sig: Take 1 tablet (500 mg total) by mouth daily   aspirin 325 mg tablet  Self No No   Sig: Take 1 tablet (325 mg total) by mouth daily   atorvastatin (LIPITOR) 80 mg tablet  Self No No   Sig: Take 1 tablet (80 mg total) by mouth daily with dinner   docusate sodium (COLACE) 100 mg capsule  Self No No   Sig: Take 1 capsule (100 mg total) by mouth 2 (two) times a day   lisinopril (ZESTRIL) 5 mg tablet  Self No No   Sig: Take 1 tablet (5 mg total) by mouth daily   melatonin 3 mg  Self No No   Sig: Take 1  tablet (3 mg total) by mouth daily at bedtime   metoprolol tartrate (LOPRESSOR) 25 mg tablet  Self No No   Sig: Take 1 tablet (25 mg total) by mouth every 12 (twelve) hours   oxyCODONE (ROXICODONE) 5 immediate release tablet  Self No No   Sig: Take 2.5 mg every 6 hours as needed for moderate pain or take 5 mg every 8 hours as needed for severe pain   pantoprazole (PROTONIX) 40 mg tablet  Self No No   Sig: Take 1 tablet (40 mg total) by mouth daily      Facility-Administered Medications: None       Past Medical History:   Diagnosis Date    DEISI (acute kidney injury) ([facility])     Chronic HFrEF (heart failure with reduced ejection fraction) ([facility]) 2024    Coronary artery disease     Hypertension     Prediabetes     S/P CABG x 2 02/15/2024    S/P mitral valve repair 02/15/2024       Past Surgical History:   Procedure Laterality Date    CARDIAC CATHETERIZATION N/A 2/12/2024    Procedure: Cardiac catheterization;  Surgeon: Shanon Pal DO;  Location: [facility];  Service: Cardiology    CARDIAC CATHETERIZATION Left 2/12/2024    Procedure: Cardiac Left Heart Cath;  Surgeon: Shanon Pal DO;  Location: [facility];  Service: Cardiology    CARDIAC CATHETERIZATION N/A 2/12/2024    Procedure: Cardiac Coronary Angiogram;  Surgeon: Shanon Pal DO;  Location: [facility];  Service: Cardiology    [place] CORONARY ARTERY BYP W/VEIN & ARTERY GRAFT 2 VEIN N/A 2/15/2024    Procedure: CORONARY ARTERY BYPASS GRAFT (CABG) X 2 VESSELS, SVG --> RCA, LIMA to LAD. MITRAL REPAIR WITH  32mm PHYSIO II MITRAL RING;  Surgeon: AGNES Shaw MD;  Location: [facility];  Service: Cardiac Surgery    [place] INSERTION INTRA-AORTIC BALLOON ASSIST DEV PERQ Right 2/15/2024    Procedure: INSERTION INTRA BALLOON PUMP AORTIC (IABP);  Surgeon: AGNES Shaw MD;  Location: [facility];  Service: Cardiac Surgery       History reviewed. No pertinent family history.  I have reviewed and agree with the history as  documented.    E-Cigarette/Vaping     E-Cigarette/Vaping Substances     Social History     Tobacco Use    Smoking status: Never    Smokeless tobacco: Never   Substance Use Topics    Alcohol use: Not Currently    Drug use: Never        Review of Systems   Constitutional:  Positive for activity change, appetite change, chills, fatigue and unexpected weight change (weight loss). Negative for fever.   Cardiovascular:  Negative for chest pain.       Physical Exam  ED Triage Vitals   Temperature Pulse Respirations Blood Pressure SpO2   03/03/24 1058 03/03/24 1041 03/03/24 1041 03/03/24 1041 03/03/24 1041   (!) 96.9 °F (36.1 °C) 85 18 (!) 89/64 100 %      Temp Source Heart Rate Source Patient Position - Orthostatic VS BP Location FiO2 (%)   03/03/24 1042 03/03/24 1041 03/03/24 1041 03/03/24 1041 --   Oral Monitor Sitting Left arm       Pain Score       03/03/24 1041       2             Orthostatic Vital Signs  Vitals:    03/03/24 1400 03/03/24 1430 03/03/24 1500 03/03/24 1530   BP: 96/64 102/57 (!) 88/53 90/55   Pulse: 88 90 88 86   Patient Position - Orthostatic VS: Lying Lying Lying        Physical Exam  Vitals and nursing note reviewed.   Constitutional:       General: He is not in acute distress.     Appearance: He is underweight.   HENT:      Head: Normocephalic and atraumatic.   Eyes:      Conjunctiva/sclera: Conjunctivae normal.   Cardiovascular:      Rate and Rhythm: Normal rate and regular rhythm.      Pulses: Normal pulses.      Heart sounds: No murmur heard.     No friction rub. No gallop.   Pulmonary:      Effort: Pulmonary effort is normal. No respiratory distress.      Breath sounds: Normal breath sounds.   Chest:       Abdominal:      Palpations: Abdomen is soft.      Tenderness: There is no abdominal tenderness.   Musculoskeletal:         General: No swelling.      Cervical back: Neck supple.   Skin:     General: Skin is warm and dry.      Capillary Refill: Capillary refill takes less than 2 seconds.    Neurological:      Mental Status: He is alert.   Psychiatric:         Mood and Affect: Mood normal.         ED Medications  Medications   sodium chloride 0.9 % bolus 500 mL (0 mL Intravenous Stopped 3/3/24 1420)   ondansetron (ZOFRAN) injection 4 mg (4 mg Intravenous Given 3/3/24 1158)   magnesium sulfate 2 g/50 mL IVPB (premix) 2 g (0 g Intravenous Stopped 3/3/24 1556)       Diagnostic Studies  Results Reviewed       Procedure Component Value Units Date/Time    HS Troponin I 2hr [[id number]]  (Normal) Collected: 03/03/24 1326    Lab Status: Final result Specimen: Blood from Arm, Right Updated: 03/03/24 1404     hs TnI 2hr 23 ng/L      Delta 2hr hsTnI -5 ng/L     Procalcitonin [[id number]]  (Normal) Collected: 03/03/24 1155    Lab Status: Final result Specimen: Blood from Arm, Right Updated: 03/03/24 1311     Procalcitonin 0.06 ng/ml     HS Troponin 0hr (reflex protocol) [[id number]]  (Normal) Collected: 03/03/24 1155    Lab Status: Final result Specimen: Blood from Arm, Right Updated: 03/03/24 1308     hs TnI 0hr 28 ng/L     HS Troponin I 4hr [[id number]]     Lab Status: No result Specimen: Blood     Phosphorus [[id number]]  (Normal) Collected: 03/03/24 1155    Lab Status: Final result Specimen: Blood from Arm, Right Updated: 03/03/24 1257     Phosphorus 3.1 mg/dL     Comprehensive metabolic panel [[id number]]  (Abnormal) Collected: 03/03/24 1155    Lab Status: Final result Specimen: Blood from Arm, Right Updated: 03/03/24 1257     Sodium 133 mmol/L      Potassium 3.6 mmol/L      Chloride 100 mmol/L      CO2 24 mmol/L      ANION GAP 9 mmol/L      BUN 15 mg/dL      Creatinine 0.88 mg/dL      Glucose 100 mg/dL      Calcium 9.9 mg/dL      AST 17 U/L      ALT 17 U/L      Alkaline Phosphatase 107 U/L      Total Protein 8.1 g/dL      Albumin 4.1 g/dL      Total Bilirubin 1.00 mg/dL      eGFR 92 ml/min/1.73sq m     Narrative:      National Kid[employer] guidelines for Chronic Kidney Disease (CKD):     Stage 1 with  normal or high GFR (GFR > 90 mL/min/1.73 square meters)    Stage 2 Mild CKD (GFR = 60-89 mL/min/1.73 square meters)    Stage 3A Moderate CKD (GFR = 45-59 mL/min/1.73 square meters)    Stage 3B Moderate CKD (GFR = 30-44 mL/min/1.73 square meters)    Stage 4 Severe CKD (GFR = 15-29 mL/min/1.73 square meters)    Stage 5 End Stage CKD (GFR <15 mL/min/1.73 square meters)  Note: GFR calculation is accurate only with a steady state creatinine    Lipase [[id number]]  (Abnormal) Collected: 03/03/24 1155    Lab Status: Final result Specimen: Blood from Arm, Right Updated: 03/03/24 1257     Lipase 9 u/L     Magnesium [[id number]]  (Abnormal) Collected: 03/03/24 1155    Lab Status: Final result Specimen: Blood from Arm, Right Updated: 03/03/24 1257     Magnesium 1.5 mg/dL     Lactic acid [[id number]]  (Normal) Collected: 03/03/24 1155    Lab Status: Final result Specimen: Blood from Arm, Right Updated: 03/03/24 1253     LACTIC ACID 1.2 mmol/L     Narrative:      Result may be elevated if tourniquet was used during collection.    Protime-INR [[id number]]  (Normal) Collected: 03/03/24 1155    Lab Status: Final result Specimen: Blood from Arm, Right Updated: 03/03/24 1233     Protime 13.8 seconds      INR 1.07    APTT [[id number]]  (Normal) Collected: 03/03/24 1155    Lab Status: Final result Specimen: Blood from Arm, Right Updated: 03/03/24 1233     PTT 30 seconds     CBC and differential [[id number]]  (Abnormal) Collected: 03/03/24 1155    Lab Status: Final result Specimen: Blood from Arm, Right Updated: 03/03/24 1221     WBC 12.31 Thousand/uL      RBC 4.54 Million/uL      Hemoglobin 13.0 g/dL      Hematocrit 40.7 %      MCV 90 fL      MCH 28.6 pg      MCHC 31.9 g/dL      RDW 13.1 %      MPV 8.5 fL      Platelets 746 Thousands/uL      nRBC 0 /100 WBCs      Neutrophils Relative 75 %      Immat GRANS % 0 %      Lymphocytes Relative 12 %      Monocytes Relative 10 %      Eosinophils Relative 2 %      Basophils Relative 1 %       Neutrophils Absolute 9.28 Thousands/µL      Immature Grans Absolute 0.05 Thousand/uL      Lymphocytes Absolute 1.45 Thousands/µL      Monocytes Absolute 1.19 Thousand/µL      Eosinophils Absolute 0.26 Thousand/µL      Basophils Absolute 0.08 Thousands/µL     Blood culture #1 [[id number]] Collected: 03/03/24 1155    Lab Status: In process Specimen: Blood from Arm, Left Updated: 03/03/24 1214    Blood culture #2 [[id number]] Collected: 03/03/24 1155    Lab Status: In process Specimen: Blood from Arm, Right Updated: 03/03/24 1214    UA w Reflex to Microscopic w Reflex to Culture [[id number]]     Lab Status: No result Specimen: Urine     Fingerstick Glucose (POCT) [[id number]]  (Normal) Collected: 03/03/24 1106    Lab Status: Final result Updated: 03/03/24 1110     POC Glucose 103 mg/dl                    XR chest pa & lateral   ED Interpretation by Ernst Montes DO (03/03 1305)   No acute cardiopulmonary pathology             Procedures  ECG 12 Lead Documentation Only    Date/Time: 3/3/2024 11:43 AM    Performed by: Ernst Montes DO  Authorized by: Ernst Montes DO    Indications / Diagnosis:  Chest pain  ECG reviewed by me, the ED Provider: yes    Patient location:  ED  Previous ECG:     Previous ECG:  Compared to current    Similarity:  Changes noted    Comparison to cardiac monitor: Yes    Interpretation:     Interpretation: abnormal    Rate:     ECG rate:  86    ECG rate assessment: normal    Rhythm:     Rhythm: sinus rhythm    Ectopy:     Ectopy: none    QRS:     QRS axis:  Right    QRS intervals:  Normal  Conduction:     Conduction: normal    ST segments:     ST segments:  Abnormal    Depression:  II, III and aVF  T waves:     T waves: normal          ED Course  ED Course as of 03/03/24 1615   Sun Mar 03, 2024   1244 CBC and differential(!)  Hemoconcentrate?   1301 Sodium(!): 133   1301 ALK PHOS(!): 107   1301 MAGNESIUM(!): 1.5   1301 Phosphorus: 3.1   1301 LIPASE(!): 9   1326 hs TnI 0hr: 28              HEART Risk Score      Flowsheet Row Most Recent Value   Heart Score Risk Calculator    History 1 Filed at: 03/03/2024 1613   ECG 1 Filed at: 03/03/2024 1613   Age 1 Filed at: 03/03/2024 1613   Risk Factors 1 Filed at: 03/03/2024 1613   Troponin 1 Filed at: 03/03/2024 1613   HEART Score 5 Filed at: 03/03/2024 1613                        SBIRT 20yo+      Flowsheet Row Most Recent Value   Initial Alcohol Screen: US AUDIT-C     1. How often do you have a drink containing alcohol? 0 Filed at: 03/03/2024 1041   2. How many drinks containing alcohol do you have on a typical day you are drinking?  0 Filed at: 03/03/2024 1041   3a. Male UNDER 65: How often do you have five or more drinks on one occasion? 0 Filed at: 03/03/2024 1041   Audit-C Score 0 Filed at: 03/03/2024 1041   JOSE ANGEL: How many times in the past year have you...    Used an illegal drug or used a prescription medication for non-medical reasons? Never Filed at: 03/03/2024 1041                  Medical Decision Making  63-year-old male presents emergency department complaint of weakness and decreased oral intake    DDx: Failure to thrive, loss of appetite    Plan: sepsis labs, admission for failure to thrive    Patient presents to the emergency department complaining of overall fatigue and failure to thrive following CABG surgery in the beginning of February.  Patient's workup is remarkable for hyponatremia, elevated alk phos, leukocytosis, hypomagnesemia.  Chest x-ray is overall unremarkable for acute pathology.  Laboratory markers and lactic acid are within normal limits.  Patient will be admitted to medical service for further management for failure to thrive.     Amount and/or Complexity of Data Reviewed  Labs: ordered. Decision-making details documented in ED Course.  Radiology: ordered and independent interpretation performed.    Risk  Prescription drug management.  Decision regarding hospitalization.          Disposition  Final diagnoses:    Failure to thrive in adult   Fatigue   Weakness   Hyponatremia   Hypomagnesemia     Time reflects when diagnosis was documented in both MDM as applicable and the Disposition within this note       Time User Action Codes Description Comment    3/3/2024 11:44 AM Ernst Montes [R62.7] Failure to thrive in adult     3/3/2024 11:44 AM Ernst Montes [R53.83] Fatigue     3/3/2024 11:44 AM Ernst Montes [R53.1] Weakness     3/3/2024  1:36 PM Ernst Montes [E87.1] Hyponatremia     3/3/2024  1:36 PM Ernst Montes [E83.42] Hypomagnesemia     3/3/2024  2:37 PM aHrdy Monreal [R19.7] Diarrhea           ED Disposition       ED Disposition   Admit    Condition   Stable    Date/Time   Sun Mar 3, 2024 3403    Comment   Case was discussed with SOD and the patient's admission status was agreed to be Admission Status: inpatient status to the service of Dr. madsen .               Follow-up Information    None         Patient's Medications   Discharge Prescriptions    No medications on file     No discharge procedures on file.    PDMP Review         Value Time User    PDMP Reviewed  Yes 2/21/2024 12:34 PM Salima Maddox PA-C             ED Provider  Attending physically available and evaluated Rohan Don. I managed the patient along with the ED Attending.    Electronically Signed by           Ernst Montes DO  03/03/24 4581

## 2024-03-03 NOTE — Clinical Note
Case was discussed with medicine and the patient's admission status was agreed to be Admission Status: inpatient status to the service of Dr. madsen .

## 2024-03-03 NOTE — ED ATTENDING ATTESTATION
3/3/2024  I, Nghia Izaguirre MD, saw and evaluated the patient. I have discussed the patient with the resident/non-physician practitioner and agree with the resident's/non-physician practitioner's findings, Plan of Care, and MDM as documented in the resident's/non-physician practitioner's note, except where noted. All available labs and Radiology studies were reviewed.  I was present for key portions of any procedure(s) performed by the resident/non-physician practitioner and I was immediately available to provide assistance.       At this point I agree with the current assessment done in the Emergency Department.  I have conducted an independent evaluation of this patient a history and physical is as follows:    ED Course         Critical Care Time  Procedures    62 yo male with admission one month ago for cabg,had life vest placed here for weight loss and fatigue. Nausea, no vomiting. No chest pain, no sob.  Pt with decreased po intake.  Pmh chf, htn, sho.  Vss, afebrile, tachypnea, lungs cta, rrr, abdomen soft nontender, incision on chest c/d/I, septic, cardiac workup, mg, phos.

## 2024-03-04 ENCOUNTER — HOME CARE VISIT (OUTPATIENT)
Dept: HOME HEALTH SERVICES | Facility: HOME HEALTHCARE | Age: 64
End: 2024-03-04
Payer: COMMERCIAL

## 2024-03-04 PROBLEM — J95.811 POSTPROCEDURAL PNEUMOTHORAX: Status: ACTIVE | Noted: 2024-03-04

## 2024-03-04 PROBLEM — R65.10 SIRS (SYSTEMIC INFLAMMATORY RESPONSE SYNDROME) (HCC): Status: ACTIVE | Noted: 2024-03-04

## 2024-03-04 LAB
ANION GAP SERPL CALCULATED.3IONS-SCNC: 8 MMOL/L
BACTERIA UR QL AUTO: ABNORMAL /HPF
BILIRUB UR QL STRIP: NEGATIVE
BUN SERPL-MCNC: 15 MG/DL (ref 5–25)
C COLI+JEJUNI TUF STL QL NAA+PROBE: NEGATIVE
C DIFF TOX GENS STL QL NAA+PROBE: NEGATIVE
CALCIUM SERPL-MCNC: 8.4 MG/DL (ref 8.4–10.2)
CHLORIDE SERPL-SCNC: 102 MMOL/L (ref 96–108)
CLARITY UR: CLEAR
CO2 SERPL-SCNC: 21 MMOL/L (ref 21–32)
COLOR UR: ABNORMAL
CREAT SERPL-MCNC: 0.77 MG/DL (ref 0.6–1.3)
EC STX1+STX2 GENES STL QL NAA+PROBE: NEGATIVE
ERYTHROCYTE [DISTWIDTH] IN BLOOD BY AUTOMATED COUNT: 13.2 % (ref 11.6–15.1)
GFR SERPL CREATININE-BSD FRML MDRD: 97 ML/MIN/1.73SQ M
GLUCOSE SERPL-MCNC: 127 MG/DL (ref 65–140)
GLUCOSE UR STRIP-MCNC: NEGATIVE MG/DL
HCT VFR BLD AUTO: 34.3 % (ref 36.5–49.3)
HGB BLD-MCNC: 11 G/DL (ref 12–17)
HGB UR QL STRIP.AUTO: NEGATIVE
HYALINE CASTS #/AREA URNS LPF: ABNORMAL /LPF
KETONES UR STRIP-MCNC: NEGATIVE MG/DL
LEUKOCYTE ESTERASE UR QL STRIP: NEGATIVE
MAGNESIUM SERPL-MCNC: 1.7 MG/DL (ref 1.9–2.7)
MCH RBC QN AUTO: 28.9 PG (ref 26.8–34.3)
MCHC RBC AUTO-ENTMCNC: 32.1 G/DL (ref 31.4–37.4)
MCV RBC AUTO: 90 FL (ref 82–98)
MUCOUS THREADS UR QL AUTO: ABNORMAL
NITRITE UR QL STRIP: NEGATIVE
NON-SQ EPI CELLS URNS QL MICRO: ABNORMAL /HPF
PH UR STRIP.AUTO: 5.5 [PH]
PHOSPHATE SERPL-MCNC: 3.1 MG/DL (ref 2.3–4.1)
PLATELET # BLD AUTO: 529 THOUSANDS/UL (ref 149–390)
PMV BLD AUTO: 8.3 FL (ref 8.9–12.7)
POTASSIUM SERPL-SCNC: 3.6 MMOL/L (ref 3.5–5.3)
PROT UR STRIP-MCNC: ABNORMAL MG/DL
RBC # BLD AUTO: 3.8 MILLION/UL (ref 3.88–5.62)
RBC #/AREA URNS AUTO: ABNORMAL /HPF
SALMONELLA SP SPAO STL QL NAA+PROBE: NEGATIVE
SHIGELLA SP+EIEC IPAH STL QL NAA+PROBE: NEGATIVE
SODIUM SERPL-SCNC: 131 MMOL/L (ref 135–147)
SP GR UR STRIP.AUTO: >=1.05 (ref 1–1.03)
UROBILINOGEN UR STRIP-ACNC: <2 MG/DL
WBC # BLD AUTO: 13.67 THOUSAND/UL (ref 4.31–10.16)
WBC #/AREA URNS AUTO: ABNORMAL /HPF

## 2024-03-04 PROCEDURE — 84100 ASSAY OF PHOSPHORUS: CPT | Performed by: FAMILY MEDICINE

## 2024-03-04 PROCEDURE — 83735 ASSAY OF MAGNESIUM: CPT | Performed by: FAMILY MEDICINE

## 2024-03-04 PROCEDURE — 99232 SBSQ HOSP IP/OBS MODERATE 35: CPT | Performed by: PHYSICIAN ASSISTANT

## 2024-03-04 PROCEDURE — 85027 COMPLETE CBC AUTOMATED: CPT | Performed by: FAMILY MEDICINE

## 2024-03-04 PROCEDURE — 99255 IP/OBS CONSLTJ NEW/EST HI 80: CPT | Performed by: INTERNAL MEDICINE

## 2024-03-04 PROCEDURE — 80048 BASIC METABOLIC PNL TOTAL CA: CPT | Performed by: FAMILY MEDICINE

## 2024-03-04 RX ORDER — LOPERAMIDE HYDROCHLORIDE 2 MG/1
2 CAPSULE ORAL 4 TIMES DAILY PRN
Status: DISCONTINUED | OUTPATIENT
Start: 2024-03-04 | End: 2024-03-06 | Stop reason: HOSPADM

## 2024-03-04 RX ORDER — MAGNESIUM SULFATE HEPTAHYDRATE 40 MG/ML
2 INJECTION, SOLUTION INTRAVENOUS ONCE
Status: COMPLETED | OUTPATIENT
Start: 2024-03-04 | End: 2024-03-04

## 2024-03-04 RX ORDER — VANCOMYCIN HYDROCHLORIDE 125 MG/1
125 CAPSULE ORAL EVERY 6 HOURS SCHEDULED
Status: DISCONTINUED | OUTPATIENT
Start: 2024-03-04 | End: 2024-03-04

## 2024-03-04 RX ADMIN — HEPARIN SODIUM 5000 UNITS: 5000 INJECTION INTRAVENOUS; SUBCUTANEOUS at 21:37

## 2024-03-04 RX ADMIN — HEPARIN SODIUM 5000 UNITS: 5000 INJECTION INTRAVENOUS; SUBCUTANEOUS at 14:31

## 2024-03-04 RX ADMIN — MELATONIN 3 MG: at 21:37

## 2024-03-04 RX ADMIN — METOPROLOL TARTRATE 25 MG: 25 TABLET, FILM COATED ORAL at 08:29

## 2024-03-04 RX ADMIN — PANTOPRAZOLE SODIUM 40 MG: 40 TABLET, DELAYED RELEASE ORAL at 08:29

## 2024-03-04 RX ADMIN — HEPARIN SODIUM 5000 UNITS: 5000 INJECTION INTRAVENOUS; SUBCUTANEOUS at 06:37

## 2024-03-04 RX ADMIN — METOPROLOL TARTRATE 25 MG: 25 TABLET, FILM COATED ORAL at 21:37

## 2024-03-04 RX ADMIN — VANCOMYCIN HYDROCHLORIDE 125 MG: 125 CAPSULE ORAL at 14:31

## 2024-03-04 RX ADMIN — ASPIRIN 325 MG ORAL TABLET 325 MG: 325 PILL ORAL at 08:29

## 2024-03-04 RX ADMIN — ATORVASTATIN CALCIUM 80 MG: 80 TABLET, FILM COATED ORAL at 16:43

## 2024-03-04 RX ADMIN — MAGNESIUM SULFATE HEPTAHYDRATE 2 G: 40 INJECTION, SOLUTION INTRAVENOUS at 11:17

## 2024-03-04 RX ADMIN — Medication 2.5 MG: at 08:29

## 2024-03-04 NOTE — ASSESSMENT & PLAN NOTE
Minuscule ptx noted on CXR. Suspect sequela from CT surgery. No respiratory symptoms. On room air.   Repeat CXR in am

## 2024-03-04 NOTE — ASSESSMENT & PLAN NOTE
Malnutrition Findings:                                 BMI Findings:           Body mass index is 16.45 kg/m².   \  Secondary to diarrhea and anorexia.   Lost 15 lbs   Nutrition consult

## 2024-03-04 NOTE — PROGRESS NOTES
[facility]  Progress Note  Name: Rohan Don I  MRN: [id number]  Unit/Bed#: [facility] 421-01 I Date of Admission: 3/3/2024   Date of Service: 3/4/2024 I Hospital Day: 1    Addendum: stool studies and c.dif are negative. Discontinue vancomycin. D/W GI and suspect viral gastroenteritis.  Continue supportive care.  Imodium started    Assessment/Plan   * Diarrhea  Assessment & Plan  Patient reports of ongoing diarrhea requiring Imodium since he had CABG 3 weeks ago ago. Has anorexia. Lost 15 lbs in 1 month.   No fever, abdominal pain associated. No melena/hematochezia.  WBC 12.  Lipase 9.  Obtain Stool enteric PCR, C-diff, ESR/CRP, fecal calprotectin.  CT Abd/pelvis with PO/IV contrast - small bowel enterocolitis  Start empiric Vancomycin until stool studies return  GI consult appreciated       SIRS (systemic inflammatory response syndrome) (Formerly [facility])  Assessment & Plan  In the setting of leukocytosis, tachypnea, tachycardia in the setting of enterocolitis. Still ruling out infection  500ml bolus given in ED. No further fluids given in setting of CHF/EF 25%  Follow up stool cultures  Blood cultures pending  Procal negative  UA unremarkable  Lactic acid 1.2    Hypomagnesemia  Assessment & Plan  Replete    Severe protein-calorie malnutrition (HCC)  Assessment & Plan  Malnutrition Findings:                                 BMI Findings:           Body mass index is 16.45 kg/m².   \  Secondary to diarrhea and anorexia.   Lost 15 lbs   Nutrition consult    S/P CABG x 2  Assessment & Plan  S/P CABG to RCA, LIMA to LAD on 2/15/24  Continue full dose of aspirin, statin, metoprolol. Holding lisinopril for low BP.      Chronic HFrEF (heart failure with reduced ejection fraction) (Formerly [facility])  Assessment & Plan  Wt Readings from Last 3 Encounters:   03/04/24 53.5 kg (117 lb 15.1 oz)   02/27/24 55.7 kg (122 lb 14.4 oz)   02/26/24 56.3 kg (124 lb 3.2 oz)     Last Echo showed EF 25%. Currently wearing  lifevest.  Patient was taken off of diuretics by Heart failure team OP.  Follows with Dr Dandre Cummins.   Fluid restriction.          HTN (hypertension)  Assessment & Plan  Low BP in ED.  Hold home lisinopril.  Continue PO Metoprolol with holding parameters    Postprocedural pneumothorax  Assessment & Plan  Minuscule ptx noted on CXR. Suspect sequela from CT surgery. No respiratory symptoms. On room air.   Repeat CXR in am             VTE Pharmacologic Prophylaxis:   Moderate Risk (Score 3-4) - Pharmacological DVT Prophylaxis Ordered: heparin.    Mobility:   Basic Mobility Inpatient Raw Score: 20  JH-HLM Goal: 6: Walk 10 steps or more  JH-HLM Achieved: 6: Walk 10 steps or more  HLM Goal achieved. Continue to encourage appropriate mobility.    Patient Centered Rounds: I performed bedside rounds with nursing staff today.   Discussions with Specialists or Other Care Team Provider: case management    Education and Discussions with Family / Patient: Attempted to update  (wife) via phone. Left voicemail.     Total Time Spent on Date of Encounter in care of patient: 35 mins. This time was spent on one or more of the following: performing physical exam; counseling and coordination of care; obtaining or reviewing history; documenting in the medical record; reviewing/ordering tests, medications or procedures; communicating with other healthcare professionals and discussing with patient's family/caregivers.    Current Length of Stay: 1 day(s)  Current Patient Status: Inpatient   Certification Statement: The patient will continue to require additional inpatient hospital stay due to pending stool studies  Discharge Plan: Anticipate discharge in 24-48 hrs to home.    Code Status: Level 1 - Full Code    Subjective:   Feeling a little better. Still with diarrhea. Had about 3-4 episodes yesterday. Denies abdominal pain. Poor appetite, but able to eat without difficulty.     Objective:     Vitals:   Temp (24hrs), Av.6  °F (36.4 °C), Min:96.9 °F (36.1 °C), Max:98.2 °F (36.8 °C)    Temp:  [96.9 °F (36.1 °C)-98.2 °F (36.8 °C)] 97.3 °F (36.3 °C)  HR:  [] 106  Resp:  [15-30] 16  BP: ()/(53-66) 111/66  SpO2:  [95 %-100 %] 98 %  Body mass index is 16.45 kg/m².     Input and Output Summary (last 24 hours):     Intake/Output Summary (Last 24 hours) at 3/4/2024 1040  Last data filed at 3/4/2024 0819  Gross per 24 hour   Intake 272 ml   Output 900 ml   Net -628 ml       Physical Exam:   Physical Exam  Constitutional:       Appearance: Normal appearance.      Comments: thin   HENT:      Head: Normocephalic and atraumatic.   Cardiovascular:      Rate and Rhythm: Normal rate and regular rhythm.      Heart sounds: No murmur heard.  Pulmonary:      Effort: Pulmonary effort is normal.      Breath sounds: Normal breath sounds.   Abdominal:      General: Bowel sounds are normal.      Palpations: Abdomen is soft.      Tenderness: There is no abdominal tenderness.   Neurological:      Mental Status: He is alert and oriented to person, place, and time. Mental status is at baseline.   Psychiatric:         Mood and Affect: Mood normal.          Additional Data:     Labs:  Results from last 7 days   Lab Units 03/04/24  0436 03/03/24  1652 03/03/24  1155   WBC Thousand/uL 13.67*  --  12.31*   HEMOGLOBIN g/dL 11.0*  --  13.0   HEMATOCRIT % 34.3*  --  40.7   PLATELETS Thousands/uL 529*   < > 746*   NEUTROS PCT %  --   --  75   LYMPHS PCT %  --   --  12*   MONOS PCT %  --   --  10   EOS PCT %  --   --  2    < > = values in this interval not displayed.     Results from last 7 days   Lab Units 03/04/24  0436 03/03/24  1155   SODIUM mmol/L 131* 133*   POTASSIUM mmol/L 3.6 3.6   CHLORIDE mmol/L 102 100   CO2 mmol/L 21 24   BUN mg/dL 15 15   CREATININE mg/dL 0.77 0.88   ANION GAP mmol/L 8 9   CALCIUM mg/dL 8.4 9.9   ALBUMIN g/dL  --  4.1   TOTAL BILIRUBIN mg/dL  --  1.00   ALK PHOS U/L  --  107*   ALT U/L  --  17   AST U/L  --  17   GLUCOSE RANDOM  mg/dL 127 100     Results from last 7 days   Lab Units 03/03/24  1155   INR  1.07     Results from last 7 days   Lab Units 03/03/24  1106   POC GLUCOSE mg/dl 103         Results from last 7 days   Lab Units 03/03/24  1155   LACTIC ACID mmol/L 1.2   PROCALCITONIN ng/ml 0.06       Lines/Drains:  Invasive Devices       Peripheral Intravenous Line  Duration             Peripheral IV 03/03/24 Right Antecubital <1 day                      Telemetry:  Telemetry Orders (From admission, onward)               24 Hour Telemetry Monitoring  Continuous x 24 Hours (Telem)        Comments: CABG one month ago, has lifevest   Question:  Reason for 24 Hour Telemetry  Answer:  PCI/EP study (including pacer and ICD implementation), Cardiac surgery, MI, abnormal cardiac cath, and chest pain- rule out MI                     Telemetry Reviewed: Normal Sinus Rhythm  Indication for Continued Telemetry Use: Lifevest (remains on tele entire hospital stay)             Imaging: Reviewed radiology reports from this admission including: chest xray and abdominal/pelvic CT    Recent Cultures (last 7 days):   Results from last 7 days   Lab Units 03/03/24  1155   BLOOD CULTURE  Received in Microbiology Lab. Culture in Progress.  Received in Microbiology Lab. Culture in Progress.       Last 24 Hours Medication List:   Current Facility-Administered Medications   Medication Dose Route Frequency Provider Last Rate    aspirin  325 mg Oral Daily Hardy Monreal MD      atorvastatin  80 mg Oral Daily With Dinner Hardy Monreal MD      heparin (porcine)  5,000 Units Subcutaneous Q8H JENN Monreal MD      magnesium sulfate  2 g Intravenous Once Jyoti Rosas PA-C      melatonin  3 mg Oral HS Hardy Monreal MD      metoprolol tartrate  25 mg Oral Q12H JENN Monreal MD      ondansetron  4 mg Intravenous Q6H PRN Hardy Monreal MD      oxyCODONE  2.5 mg Oral Q6H PRN Hardy Monreal MD      pantoprazole  40 mg Oral Daily Hardy Monreal MD      vancomycin oral (capsules  or solution)  125 mg Oral Q6H [facility] Jyoti Rosas PA-C          Today, Patient Was Seen By: Jyoti Rosas PA-C    **Please Note: This note may have been constructed using a voice recognition system.**

## 2024-03-04 NOTE — UTILIZATION REVIEW
NOTIFICATION OF INPATIENT ADMISSION   AUTHORIZATION REQUEST   SERVICING FACILITY:   [facility]  Address: [address] [place] [address]  Tax ID: [id number]  NPI: [id number] ATTENDING PROVIDER:  Attending Name and NPI#: Pattie Alejo Md [[id number]]  Address: [address] [place] [address]  Phone: [phone]   ADMISSION INFORMATION:  Place of Service: Inpatient [facility]  Place of Service Code: 21  Inpatient Admission Date/Time: 3/3/24  2:14 PM  Discharge Date/Time: No discharge date for patient encounter.  Admitting Diagnosis Code/Description:  Diarrhea [R19.7]  Hypomagnesemia [E83.42]  Hyponatremia [E87.1]  Fatigue [R53.83]  Weakness [R53.1]  Failure to thrive in adult [R62.7]  Known medical problems [Z78.9]     UTILIZATION REVIEW CONTACT:  Sam Clarke, Utilization   Network Utilization Review Department  Phone: [phone]  Fax: [phone]  Email: [email or web address]  Contact for approvals/pending authorizations, clinical reviews, and discharge.     PHYSICIAN ADVISORY SERVICES:  Medical Necessity Denial & Yqjg-th-Erfp Review  Phone: [phone]  Fax: [phone]  Email: [email or web address]     DISCHARGE SUPPORT TEAM:  For Patients Discharge Needs & Updates  Phone: [phone] opt. 2 Fax: [phone]  Email: [email or web address]

## 2024-03-04 NOTE — CONSULTS
Consultation -  [facility]  Rohan Don 63 y.o. male MRN: [id number]  Unit/Bed#: [facility] 421-01 Encounter: [id number]            Inpatient consult to gastroenterology     Date/Time  3/4/2024 8:34 AM     Performed by  Eriberto Tyler DO   Authorized by  Hardy Monreal MD             Reason for Consult / Principal Problem:     Diarrhea      ASSESSMENT AND PLAN:      63-year-old male with past medical history of recent CABG on 2/15/2024, hypertension, heart failure with EF 25%, mitral valve replacement who is presenting with acute diarrhea.  GI is consulted for further management.    Acute diarrhea  Hypomagnesemia  Anemia  Malnutrition  Suspect infection as cause of diarrhea at this time.  Did receive antibiotics per surgery.  At risk for C. difficile infection.  WBC count 13.67.  No signs of overt GI bleeding.  Hemoglobin is stable at 11.0 likely decreased from recent surgery.  Fortunately he is improving in terms of frequency.  CT showed findings of nonspecific enterocolitis.  Follow-up stool studies including stool enteric panel, C. difficile.  If bacterial studies are negative can consider Imodium to decrease frequency of diarrhea if needed.  Restrict dietary lactose as this can exacerbate diarrhea and acute infection.  Ensure supplementation 3 times daily.  As needed Zofran for nausea.  Continue Protonix 40 mg daily.  IV fluids per primary team.  Okay to continue antiplatelets, anticoagulation.  Outpatient colonoscopy for colon cancer screening.  Would also perform EGD given malnutrition, weight loss.  Will arrange outpatient follow-up visit first.  Monitor bowel movements.  Monitor hemoglobin, transfuse for less than 7.    Gastroenterology to sign off at this time.  Please contact with any questions.  Rest of care per primary team.  Thank you for this consultation.   ______________________________________________________________________    HPI:  63-year-old male with past medical history of  recent CABG on 2/15/2024, hypertension, heart failure with EF 25% who is presenting with acute diarrhea.  GI is consulted for further management.  He states he developed diarrhea approximately 4 days postop from his CABG.  Was having 6-7 bowel movements per day.  He describes stool as watery, yellow.  Denies any melena, hematochezia.  Also had some nausea and vomiting which has resolved.  No prior colonoscopy.      REVIEW OF SYSTEMS:    Review of Systems   Constitutional:  Negative for chills and fever.   HENT:  Negative for congestion and sinus pressure.    Respiratory:  Negative for cough and shortness of breath.    Cardiovascular:  Negative for chest pain, palpitations and leg swelling.   Gastrointestinal:  Positive for diarrhea. Negative for abdominal pain, nausea and vomiting.   Genitourinary:  Negative for dysuria and hematuria.   Musculoskeletal:  Negative for arthralgias and back pain.   Skin:  Negative for color change and rash.   Neurological:  Negative for dizziness and headaches.   Psychiatric/Behavioral:  Negative for agitation and confusion.    All other systems reviewed and are negative.         Historical Information   Past Medical History:   Diagnosis Date    DEISI (acute kidney injury) ([facility])     Chronic HFrEF (heart failure with reduced ejection fraction) ([facility]) 2024    Coronary artery disease     Hypertension     Prediabetes     S/P CABG x 2 02/15/2024    S/P mitral valve repair 02/15/2024     Past Surgical History:   Procedure Laterality Date    CARDIAC CATHETERIZATION N/A 2/12/2024    Procedure: Cardiac catheterization;  Surgeon: Shanon Pal DO;  Location: [facility] CARDIAC CATH LAB;  Service: Cardiology    CARDIAC CATHETERIZATION Left 2/12/2024    Procedure: Cardiac Left Heart Cath;  Surgeon: Shanon Pal DO;  Location: [facility] CARDIAC CATH [facility];  Service: Cardiology    CARDIAC CATHETERIZATION N/A 2/12/2024    Procedure: Cardiac Coronary Angiogram;  Surgeon: Shanon Pal DO;  Location: [facility]  CARDIAC CATH LAB;  Service: Cardiology    CA CORONARY ARTERY BYP W/VEIN & ARTERY GRAFT 2 VEIN N/A 2/15/2024    Procedure: CORONARY ARTERY BYPASS GRAFT (CABG) X 2 VESSELS, SVG --> RCA, LIMA to LAD. MITRAL REPAIR WITH  32mm PHYSIO II MITRAL RING;  Surgeon: AGNES Shaw MD;  Location: [facility] MAIN OR;  Service: Cardiac Surgery    CA INSERTION INTRA-AORTIC BALLOON ASSIST DEV PERQ Right 2/15/2024    Procedure: INSERTION INTRA BALLOON PUMP AORTIC (IABP);  Surgeon: AGNES Shaw MD;  Location: [facility] MAIN [facility];  Service: Cardiac Surgery     Social History   Social History     Substance and Sexual Activity   Alcohol Use Not Currently     Social History     Substance and Sexual Activity   Drug Use Never     Social History     Tobacco Use   Smoking Status Never   Smokeless Tobacco Never     History reviewed. No pertinent family history.    Meds/Allergies     Medications Prior to Admission   Medication    acetaminophen (TYLENOL) 325 mg tablet    aspirin 325 mg tablet    atorvastatin (LIPITOR) 80 mg tablet    lisinopril (ZESTRIL) 5 mg tablet    metoprolol tartrate (LOPRESSOR) 25 mg tablet    oxyCODONE (ROXICODONE) 5 immediate release tablet    pantoprazole (PROTONIX) 40 mg tablet    ascorbic acid (VITAMIN C) 500 MG tablet    docusate sodium (COLACE) 100 mg capsule    melatonin 3 mg     Current Facility-Administered Medications   Medication Dose Route Frequency    aspirin tablet 325 mg  325 mg Oral Daily    atorvastatin (LIPITOR) tablet 80 mg  80 mg Oral Daily With Dinner    heparin (porcine) subcutaneous injection 5,000 Units  5,000 Units Subcutaneous Q8H JENN    melatonin tablet 3 mg  3 mg Oral HS    metoclopramide (REGLAN) injection 10 mg  10 mg Intravenous Q6H PRN    metoprolol tartrate (LOPRESSOR) tablet 25 mg  25 mg Oral Q12H JENN    ondansetron (ZOFRAN) injection 4 mg  4 mg Intravenous Q6H PRN    oxyCODONE (ROXICODONE) split tablet 2.5 mg  2.5 mg Oral Q6H PRN    pantoprazole (PROTONIX) EC tablet 40 mg  40 mg Oral  "Daily       No Known Allergies        Objective     Blood pressure 102/61, pulse 92, temperature 98.2 °F (36.8 °C), resp. rate 16, height 5' 11\" (1.803 m), weight 53.5 kg (117 lb 15.1 oz), SpO2 96%. Body mass index is 16.45 kg/m².      Intake/Output Summary (Last 24 hours) at 3/4/2024 0729  Last data filed at 3/4/2024 0210  Gross per 24 hour   Intake 272 ml   Output 700 ml   Net -428 ml         PHYSICAL EXAM:      Physical Exam  Vitals and nursing note reviewed.   Constitutional:       General: He is not in acute distress.     Appearance: Normal appearance. He is well-developed. He is ill-appearing.   HENT:      Head: Normocephalic and atraumatic.      Mouth/Throat:      Mouth: Mucous membranes are moist.   Eyes:      Extraocular Movements: Extraocular movements intact.      Conjunctiva/sclera: Conjunctivae normal.   Cardiovascular:      Rate and Rhythm: Normal rate.      Pulses: Normal pulses.   Pulmonary:      Effort: Pulmonary effort is normal.   Abdominal:      General: Abdomen is flat. Bowel sounds are normal. There is no distension.      Palpations: Abdomen is soft.      Tenderness: There is no abdominal tenderness. There is no guarding.   Musculoskeletal:      Cervical back: Neck supple.      Right lower leg: No edema.      Left lower leg: No edema.   Skin:     General: Skin is warm and dry.   Neurological:      General: No focal deficit present.      Mental Status: He is alert and oriented to person, place, and time.   Psychiatric:         Mood and Affect: Mood normal.         Behavior: Behavior normal.          Lab Results:   Admission on 03/03/2024   Component Date Value    Ventricular Rate 03/03/2024 87     Atrial Rate 03/03/2024 87     FL Interval 03/03/2024 148     QRSD Interval 03/03/2024 106     QT Interval 03/03/2024 378     QTC Interval 03/03/2024 454     P Axis 03/03/2024 70     QRS [place] 03/03/2024 109     T Wave [place] 03/03/2024 -71     POC Glucose 03/03/2024 103     Magnesium 03/03/2024 1.5 (L)  "    Phosphorus 03/03/2024 3.1     WBC 03/03/2024 12.31 (H)     RBC 03/03/2024 4.54     Hemoglobin 03/03/2024 13.0     Hematocrit 03/03/2024 40.7     MCV 03/03/2024 90     MCH 03/03/2024 28.6     MCHC 03/03/2024 31.9     RDW 03/03/2024 13.1     MPV 03/03/2024 8.5 (L)     Platelets 03/03/2024 746 (H)     nRBC 03/03/2024 0     Neutrophils Relative 03/03/2024 75     Immat GRANS % 03/03/2024 0     Lymphocytes Relative 03/03/2024 12 (L)     Monocytes Relative 03/03/2024 10     Eosinophils Relative 03/03/2024 2     Basophils Relative 03/03/2024 1     Neutrophils Absolute 03/03/2024 9.28 (H)     Immature Grans Absolute 03/03/2024 0.05     Lymphocytes Absolute 03/03/2024 1.45     Monocytes Absolute 03/03/2024 1.19     Eosinophils Absolute 03/03/2024 0.26     Basophils Absolute 03/03/2024 0.08     Sodium 03/03/2024 133 (L)     Potassium 03/03/2024 3.6     Chloride 03/03/2024 100     CO2 03/03/2024 24     ANION GAP 03/03/2024 9     BUN 03/03/2024 15     Creatinine 03/03/2024 0.88     Glucose 03/03/2024 100     Calcium 03/03/2024 9.9     AST 03/03/2024 17     ALT 03/03/2024 17     Alkaline Phosphatase 03/03/2024 107 (H)     Total Protein 03/03/2024 8.1     Albumin 03/03/2024 4.1     Total Bilirubin 03/03/2024 1.00     eGFR 03/03/2024 92     LACTIC ACID 03/03/2024 1.2     Procalcitonin 03/03/2024 0.06     Protime 03/03/2024 13.8     INR 03/03/2024 1.07     PTT 03/03/2024 30     Blood Culture 03/03/2024 Received in Microbiology Lab. Culture in Progress.     Blood Culture 03/03/2024 Received in Microbiology Lab. Culture in Progress.     Color, UA 03/03/2024 Light Yellow     Clarity, UA 03/03/2024 Clear     Specific Gravity, UA 03/03/2024 >=1.050 (H)     pH, UA 03/03/2024 5.5     Leukocytes, UA 03/03/2024 Negative     Nitrite, UA 03/03/2024 Negative     Protein, UA 03/03/2024 30 (1+) (A)     Glucose, UA 03/03/2024 Negative     Ketones, UA 03/03/2024 Negative     Urobilinogen, UA 03/03/2024 <2.0     Bilirubin, UA 03/03/2024 Negative      Occult Blood, UA 03/03/2024 Negative     Lipase 03/03/2024 9 (L)     hs TnI 0hr 03/03/2024 28     hs TnI 2hr 03/03/2024 23     Delta 2hr hsTnI 03/03/2024 -5     Platelets 03/03/2024 591 (H)     MPV 03/03/2024 8.3 (L)     CRP 03/03/2024 11.9 (H)     Sed Rate 03/03/2024 50 (H)     RBC, UA 03/03/2024 4-10 (A)     WBC, UA 03/03/2024 None Seen     Epithelial Cells 03/03/2024 None Seen     Bacteria, UA 03/03/2024 Occasional     MUCUS THREADS 03/03/2024 Occasional (A)     Hyaline Casts, UA 03/03/2024 0-3 (A)     Sodium 03/04/2024 131 (L)     Potassium 03/04/2024 3.6     Chloride 03/04/2024 102     CO2 03/04/2024 21     ANION GAP 03/04/2024 8     BUN 03/04/2024 15     Creatinine 03/04/2024 0.77     Glucose 03/04/2024 127     Calcium 03/04/2024 8.4     eGFR 03/04/2024 97     Phosphorus 03/04/2024 3.1     WBC 03/04/2024 13.67 (H)     RBC 03/04/2024 3.80 (L)     Hemoglobin 03/04/2024 11.0 (L)     Hematocrit 03/04/2024 34.3 (L)     MCV 03/04/2024 90     MCH 03/04/2024 28.9     MCHC 03/04/2024 32.1     RDW 03/04/2024 13.2     Platelets 03/04/2024 529 (H)     MPV 03/04/2024 8.3 (L)     Magnesium 03/04/2024 1.7 (L)        Imaging Studies: I have personally reviewed pertinent imaging studies.    Eriberto Tyler D.O.  Gastroenterology Fellow  PGY-5  Available via [employer]  3/4/2024 7:29 AM

## 2024-03-04 NOTE — PLAN OF CARE
Problem: SAFETY ADULT  Goal: Patient will remain free of falls  Description: INTERVENTIONS:  - Educate patient/family on patient safety including physical limitations  - Instruct patient to call for assistance with activity   - Consult OT/PT to assist with strengthening/mobility   - Keep Call bell within reach  - Keep bed low and locked with side rails adjusted as appropriate  - Keep care items and personal belongings within reach  - Initiate and maintain comfort rounds  - Make Fall Risk Sign visible to staff  - Offer Toileting every  Hours, in advance of need  - Initiate/Maintain alarm  - Obtain necessary fall risk management equipment:   - Apply yellow socks and bracelet for high fall risk patients  - Consider moving patient to room near nurses station  3/4/2024 1001 by Rochelle Peraza  Outcome: Progressing  3/4/2024 0957 by Rochelle Peraza  Outcome: Progressing  Goal: Maintain or return to baseline ADL function  Description: INTERVENTIONS:  -  Assess patient's ability to carry out ADLs; assess patient's baseline for ADL function and identify physical deficits which impact ability to perform ADLs (bathing, care of mouth/teeth, toileting, grooming, dressing, etc.)  - Assess/evaluate cause of self-care deficits   - Assess range of motion  - Assess patient's mobility; develop plan if impaired  - Assess patient's need for assistive devices and provide as appropriate  - Encourage maximum independence but intervene and supervise when necessary  - Involve family in performance of ADLs  - Assess for home care needs following discharge   - Consider OT consult to assist with ADL evaluation and planning for discharge  - Provide patient education as appropriate  3/4/2024 1001 by Rochelle Peraza  Outcome: Progressing  3/4/2024 0957 by Rochelle Peraza  Outcome: Progressing  Goal: Maintains/Returns to pre admission functional level  Description: INTERVENTIONS:  - Perform AM-PAC 6 Click Basic Mobility/ Daily Activity assessment daily.  -  Set and communicate daily mobility goal to care team and patient/family/caregiver.   - Collaborate with rehabilitation services on mobility goals if consulted  - Perform Range of Motion  times a day.  - Reposition patient every hours.  - Dangle patient  times a day  - Stand patient  times a day  - Ambulate patient  times a day  - Out of bed to chair  times a day   - Out of bed for meals  times a day  - Out of bed for toileting  - Record patient progress and toleration of activity level   3/4/2024 1001 by Rochelle Peraza  Outcome: Progressing  3/4/2024 0957 by Rochelle Peraza  Outcome: Progressing

## 2024-03-04 NOTE — ASSESSMENT & PLAN NOTE
Patient reports of ongoing diarrhea requiring Imodium since he had CABG 3 weeks ago ago. Has anorexia. Lost 15 lbs in 1 month.   No fever, abdominal pain associated. No melena/hematochezia.  WBC 12.  Lipase 9.  Obtain Stool enteric PCR, C-diff, ESR/CRP, fecal calprotectin.  CT Abd/pelvis with PO/IV contrast - small bowel enterocolitis  Start empiric Vancomycin until stool studies return  GI consult appreciated

## 2024-03-04 NOTE — ASSESSMENT & PLAN NOTE
Wt Readings from Last 3 Encounters:   03/04/24 53.5 kg (117 lb 15.1 oz)   02/27/24 55.7 kg (122 lb 14.4 oz)   02/26/24 56.3 kg (124 lb 3.2 oz)     Last Echo showed EF 25%. Currently wearing lifevest.  Patient was taken off of diuretics by Heart failure team OP.  Follows with Dr Dandre Cummins.   Fluid restriction.

## 2024-03-04 NOTE — CASE MANAGEMENT
Case Management Assessment & Discharge Planning Note    Patient name Rohan Don  Location [facility]-01 MRN [id number]  : 1960 Date 3/4/2024       Current Admission Date: 3/3/2024  Current Admission Diagnosis:Diarrhea   Patient Active Problem List    Diagnosis Date Noted    SIRS (systemic inflammatory response syndrome) ([facility]) 2024    Postprocedural pneumothorax 2024    Diarrhea 2024    Failure to thrive in adult 2024    Hypomagnesemia 2024    S/P CABG x 2 02/15/2024    S/P MVR (mitral valve repair) 02/15/2024    Ischemic cardiomyopathy 02/15/2024    Severe left ventricular systolic dysfunction 02/15/2024    Nodule of upper lobe of right lung 2024    Renal calculi 2024    Coronary artery disease 2024    Severe protein-calorie malnutrition ([facility]) 2024    Chronic HFrEF (heart failure with reduced ejection fraction) ([facility]) 02/10/2024    Prediabetes 2024    Elevated transaminase level 2024    Pleural effusion 2024    HTN (hypertension) 2019    Vitamin D deficiency 2014      LOS (days): 1  Geometric Mean LOS (GMLOS) (days):   Days to GMLOS:     OBJECTIVE:  PATIENT READMITTED TO HOSPITAL  Risk of Unplanned Readmission Score: 16.4         Current admission status: Inpatient       Preferred Pharmacy:   [id number] [place], [place] - [address] [address]  Phone: [phone] Fax: [phone]    Primary Care Provider: Natanael Jenkins MD    [facility] Insurance: [facility]  Secondary Insurance:     ASSESSMENT:  Active Health Care Proxies    There are no active Health Care Proxies on file.                 Readmission Root Cause  30 Day Readmission: Yes  Who directed you to return to the hospital?: PCP  Did you understand whom to contact if you had questions or problems?: Yes  Did you get your prescriptions before you left the hospital?: Yes  Were you able to get your prescriptions  filled when you left the hospital?: Yes  Did you take your medications as prescribed?: Yes  Were you able to get to your follow-up appointments?: Yes  During previous admission, was a post-acute recommendation made?: Yes  What post-acute resources were offered?: HHC ([facility] nursing/PT/OT)  Patient was readmitted due to: Diarrhea  Action Plan: [facility]    Patient Information  Admitted from:: Home  Mental Status: Alert  During Assessment patient was accompanied by: Spouse  Assessment information provided by:: Patient, Spouse  Primary Caregiver: Self  Support Systems: Spouse/significant other, Friend  County of Residence: [place]  What city do you live in?: [place]  Home entry access options. Select all that apply.: Stairs  Number of steps to enter home.: 4  Do the steps have railings?: Yes  Type of Current Residence: [facility]  Living Arrangements: Lives w/ Spouse/significant other  Is patient a ?: Yes  Is patient active with [facility] ([facility])?: No    Activities of Daily Living Prior to Admission  Functional Status: Independent  Completes ADLs independently?: Yes  Ambulates independently?: Yes  Does patient use assisted devices?: No  Does patient currently own DME?: No  Does patient have a history of Outpatient Therapy (PT/OT)?: No  Does the patient have a history of Short-Term Rehab?: No  Does patient have a history of HHC?: Yes  Does patient currently have HHC?: Yes    Current Home Health Care  Type of Current Home Care Services: Nurse visit, Home PT, Home OT  Current Home Health Agency:: [facility]  Current Home Health Follow-Up Provider:: PCP    Patient Information Continued  Income Source: Pension/custodial  Does patient have prescription coverage?: No  Does patient receive dialysis treatments?: No  Does patient have a history of substance abuse?: No  Does patient have a history of Mental Health Diagnosis?: No         Means of Transportation  Means of Transport to Appts:: Family  transport      Social Determinants of Health (SDOH)      Flowsheet Row Most Recent Value   Housing Stability    In the last 12 months, was there a time when you were not able to pay the mortgage or rent on time? N   In the last 12 months, how many places have you lived? 1   In the last 12 months, was there a time when you did not have a steady place to sleep or slept in a shelter (including now)? N   Transportation Needs    In the past 12 months, has lack of transportation kept you from medical appointments or from getting medications? no   In the past 12 months, has lack of transportation kept you from meetings, work, or from getting things needed for daily living? No   Food Insecurity    Within the past 12 months, you worried that your food would run out before you got the money to buy more. Never true   Within the past 12 months, the food you bought just didn't last and you didn't have money to get more. Never true   Utilities    In the past 12 months has the electric, gas, oil, or water company threatened to shut off services in your home? No            DISCHARGE DETAILS:    Discharge planning discussed with:: pt and pt spouse at bedside  Freedom of Choice: Yes  Comments - Freedom of Choice: Per angelia pt new insurance is not accepted at [facility]. CM submitted AURELIANO referral to [facility] and to back up [facility]. Pt is agreeable.  CM contacted family/caregiver?: Yes  Were Treatment Team discharge recommendations reviewed with patient/caregiver?: Yes  Did patient/caregiver verbalize understanding of patient care needs?: Yes  Were patient/caregiver advised of the risks associated with not following Treatment Team discharge recommendations?: Yes    Contacts  Patient Contacts: Teresita Don (Spouse)   [phone] (Y)  Relationship to Patient:: Family  Contact Method: Phone  Phone Number: [phone] (P)  Reason/Outcome: Continuity of Care, Emergency Contact, Discharge Planning    Requested Home Health Care          Is the patient interested in HHC at discharge?: Yes  Home Health Discipline requested:: Nursing, Occupational Therapy, Physical Therapy  Home Health Follow-Up Provider:: PCP  Home Health Services Needed:: Post-Op Care and Assessment, Heart Failure Management, Strengthening/Theraputic Exercises to Improve Function, Gait/ADL Training, Evaluate Functional Status and Safety  Homebound Criteria Met:: Requires the Assistance of Another Person for Safe Ambulation or to Leave the Home  Supporting Clincal Findings:: Fatigues Easliy in Short Distances, Limited Endurance    DME Referral Provided  Referral made for DME?: No    Other Referral/Resources/Interventions Provided:  Interventions: HHC    Would you like to participate in our [facility] service program?  : No - Declined    Treatment Team Recommendation: Home with Home Health Care  Discharge Destination Plan:: Home with Home Health Care  Transport at Discharge : Family                                      Additional Comments: CM introduced herself and role to pt at bedside. Pt is a readmit. Pt stated he is independent at baseline with ADLS/IADLS. [place]. Pt stated once medically cleared and ready for discharge spouse will be able to provide transport. Per [employer] pt new insurance is not accepted at [facility]. CM submitted AURELIANO referral to [facility] and to back up [facility]. Pt is agreeable. CM will continue to follow as needed    1:31 pm: Per [facility] , they do not accept pt new insurance. Back up [facility] for nursing/PT/OT submitted via aidin.

## 2024-03-04 NOTE — ASSESSMENT & PLAN NOTE
In the setting of leukocytosis, tachypnea, tachycardia in the setting of enterocolitis. Still ruling out infection  500ml bolus given in ED. No further fluids given in setting of CHF/EF 25%  Follow up stool cultures  Blood cultures pending  Procal negative  UA unremarkable  Lactic acid 1.2

## 2024-03-04 NOTE — PLAN OF CARE
Problem: SAFETY ADULT  Goal: Patient will remain free of falls  Description: INTERVENTIONS:  - Educate patient/family on patient safety including physical limitations  - Instruct patient to call for assistance with activity   - Consult OT/PT to assist with strengthening/mobility   - Keep Call bell within reach  - Keep bed low and locked with side rails adjusted as appropriate  - Keep care items and personal belongings within reach  - Initiate and maintain comfort rounds  - Make Fall Risk Sign visible to staff  - Offer Toileting every  Hours, in advance of need  - Initiate/Maintain alarm  - Obtain necessary fall risk management equipment:   - Apply yellow socks and bracelet for high fall risk patients  - Consider moving patient to room near nurses station  Outcome: Progressing  Goal: Maintain or return to baseline ADL function  Description: INTERVENTIONS:  -  Assess patient's ability to carry out ADLs; assess patient's baseline for ADL function and identify physical deficits which impact ability to perform ADLs (bathing, care of mouth/teeth, toileting, grooming, dressing, etc.)  - Assess/evaluate cause of self-care deficits   - Assess range of motion  - Assess patient's mobility; develop plan if impaired  - Assess patient's need for assistive devices and provide as appropriate  - Encourage maximum independence but intervene and supervise when necessary  - Involve family in performance of ADLs  - Assess for home care needs following discharge   - Consider OT consult to assist with ADL evaluation and planning for discharge  - Provide patient education as appropriate  Outcome: Progressing  Goal: Maintains/Returns to pre admission functional level  Description: INTERVENTIONS:  - Perform AM-PAC 6 Click Basic Mobility/ Daily Activity assessment daily.  - Set and communicate daily mobility goal to care team and patient/family/caregiver.   - Collaborate with rehabilitation services on mobility goals if consulted  - Perform  Range of Motion times a day.  - Reposition patient every  hours.  - Dangle patient  times a day  - Stand patient  times a day  - Ambulate patient  times a day  - Out of bed to chair  times a day   - Out of bed for meals  times a day  - Out of bed for toileting  - Record patient progress and toleration of activity level   Outcome: Progressing

## 2024-03-04 NOTE — RESTORATIVE TECHNICIAN NOTE
Restorative Technician Note      Patient Name: Rohan Don     Restorative Tech Visit Date: 03/04/24  Note Type: Mobility  Patient Position Upon Consult: Bedside chair  Activity Performed: Ambulated  Assistive Device: Other (Comment) (none)  Patient Position at End of Consult: All needs within reach; Bedside chair

## 2024-03-04 NOTE — UTILIZATION REVIEW
Initial Clinical Review    Admission: Date/Time/Statement:   Admission Orders (From admission, onward)       Ordered        03/03/24 1432  Inpatient Admission  Once                          Orders Placed This Encounter   Procedures    Inpatient Admission     Standing Status:   Standing     Number of Occurrences:   1     Order Specific Question:   Level of Care     Answer:   Med Surg [16]     Order Specific Question:   Estimated length of stay     Answer:   More than 2 Midnights     Order Specific Question:   Certification     Answer:   I certify that inpatient services are medically necessary for this patient for a duration of greater than two midnights. See H&P and MD Progress Notes for additional information about the patient's course of treatment.     ED Arrival Information       Expected   -    Arrival   3/3/2024 10:35    Acuity   Emergent              Means of arrival   Wheelchair    Escorted by   Family Member    Service   Hospitalist    Admission type   Emergency              Arrival complaint   medical problem             Chief Complaint   Patient presents with    Fatigue     Patient with decreased appetite and 15lb weight loss in the past 10 days. Patient discharged recently from hospital stay d/t heart failure. Patient presents wearing lifevest.       Initial Presentation: 63 y.o. male to the ED from home with complaints of decreased appetite, weight loss. Admitted to inpatient for diarrhea.  H/O CHF, coronary artery disease, mitral regurg, hypertension, prediabetes, protein malnutrition. Recently admitted from 2/9/24-2/21 for CAD. Has known EF 20%, placed on life vest.  Arrives with generalized weakness, occasional dyspnea. Wbcs 12.  Check Stool enteric PCR, C-diff, ESR/CRP, fecal calprotectin.  Check CT a/p. Mag 1.5 on arrival. REpleted and recheck. In the Ed, given IV zofran, 500 m l bolus ivfluids, 2 gms IV mag.     Date: 3/4   Day 2: CT /ap shows Small bowel enterocolitis.  Start IV vanco.  No  further IV fluids given h/o CHF.  Blood and stool cultures pending.  Having intermittent tachycardia.  BP remains a little on the low side.   GI consult: Suspect infection as cause of diarrhea at this time.  Check stool studies.  No overt GI bleeding.  Follow up stool studies.  COntinue protonix.     ED Triage Vitals   Temperature Pulse Respirations Blood Pressure SpO2   03/03/24 1058 03/03/24 1041 03/03/24 1041 03/03/24 1041 03/03/24 1041   (!) 96.9 °F (36.1 °C) 85 18 (!) 89/64 100 %      Temp Source Heart Rate Source Patient Position - Orthostatic VS BP Location FiO2 (%)   03/03/24 1042 03/03/24 1041 03/03/24 1041 03/03/24 1041 --   Oral Monitor Sitting Left arm       Pain Score       03/03/24 1041       2          Wt Readings from Last 1 Encounters:   03/04/24 53.5 kg (117 lb 15.1 oz)     Additional Vital Signs: Vital Signs (last 2 days)    Date/Time Temp Pulse Resp BP MAP (mmHg) SpO2 O2 Device Patient Position - Orthostatic VS   03/04/24 14:41:57 97.6 °F (36.4 °C) 81 -- 92/55 67 96 % -- --   03/04/24 11:13:34 -- 82 -- 106/58 74 96 % -- --   03/04/24 08:20:47 97.3 °F (36.3 °C) Abnormal  106 Abnormal  -- 111/66 81 98 % -- --   03/04/24 08:19:59 97.3 °F (36.3 °C) Abnormal  101 -- 111/66 81 97 % -- Sitting   03/04/24 0800 -- -- -- -- -- 96 % None (Room air) --   03/04/24 02:14:08 98.2 °F (36.8 °C) 92 16 102/61 75 96 % -- --   03/03/24 22:33:39 98.2 °F (36.8 °C) -- 16 92/56 68 -- -- --   03/03/24 20:59:15 -- 97 15 96/62 73 95 % -- --   03/03/24 19:44:11 97.6 °F (36.4 °C) -- 16 110/66 81 -- -- --   03/03/24 1630 -- -- -- -- -- 96 % None (Room air) --   03/03/24 16:29:10 -- 90 -- 102/57 72 96 % -- --   03/03/24 1530 -- 86 22 90/55 68 97 % -- --   03/03/24 1500 -- 88 22 88/53 Abnormal  65 97 % None (Room air) Lying   03/03/24 1430 -- 90 30 Abnormal  102/57 73 97 % None (Room air) Lying   03/03/24 1400 -- 88 25 Abnormal  96/64 72 97 % None (Room air) Lying   03/03/24 1300 -- 82 15 100/59 75 96 % None (Room air) Lying    03/03/24 1200 -- 86 19 88/56 Abnormal  68 96 % None (Room air) Lying   03/03/24 1130 -- 86 28 Abnormal  95/60 72 99 % None (Room air) Lying   03/03/24 1100 -- 82 28 Abnormal  104/61 77 99 % None (Room air) Lying   03/03/24 1058 96.9 °F (36.1 °C) Abnormal  -- -- 104/61 -- -- -- Sitting   03/03/24 1041 -- 85 18 89/64 Abnormal  -- 100 % None (Room air) Sitting     Pertinent Labs/Diagnostic Test Results:   CT abdomen pelvis w contrast   Final Result by Main Majano MD (03/04 0832)      1.  Fluid-filled distal small bowel with air-fluid levels in the colon compatible with a nonspecific enterocolitis.   2.  Otherwise no acute or suspicious findings in the abdomen or pelvis.               Workstation performed: [id number]         XR chest pa & lateral   ED Interpretation by Ernst Montes DO (03/03 1305)   No acute cardiopulmonary pathology       Final Result by Jeannette Meredith MD (03/04 0853)      Questionable minuscule left apical pneumothorax. Left-sided chest tube removed. Study limited by multiple devices overlying the patient. Consider short-term follow-up radiograph for further evaluation.      Otherwise no acute findings.      The study was marked in EPIC for immediate notification.      Workstation performed: [id number]         XR chest pa & lateral    (Results Pending)         Results from last 7 days   Lab Units 03/04/24  0436 03/03/24  1652 03/03/24  1155   WBC Thousand/uL 13.67*  --  12.31*   HEMOGLOBIN g/dL 11.0*  --  13.0   HEMATOCRIT % 34.3*  --  40.7   PLATELETS Thousands/uL 529* 591* 746*   NEUTROS ABS Thousands/µL  --   --  9.28*         Results from last 7 days   Lab Units 03/04/24  0436 03/03/24  1155   SODIUM mmol/L 131* 133*   POTASSIUM mmol/L 3.6 3.6   CHLORIDE mmol/L 102 100   CO2 mmol/L 21 24   ANION GAP mmol/L 8 9   BUN mg/dL 15 15   CREATININE mg/dL 0.77 0.88   EGFR ml/min/1.73sq m 97 92   CALCIUM mg/dL 8.4 9.9   MAGNESIUM mg/dL 1.7* 1.5*   PHOSPHORUS mg/dL 3.1 3.1      Results from last 7 days   Lab Units 03/03/24  1155   AST U/L 17   ALT U/L 17   ALK PHOS U/L 107*   TOTAL PROTEIN g/dL 8.1   ALBUMIN g/dL 4.1   TOTAL BILIRUBIN mg/dL 1.00     Results from last 7 days   Lab Units 03/03/24  1106   POC GLUCOSE mg/dl 103     Results from last 7 days   Lab Units 03/04/24  0436 03/03/24  1155   GLUCOSE RANDOM mg/dL 127 100         Results from last 7 days   Lab Units 03/03/24  1326 03/03/24  1155   HS TNI 0HR ng/L  --  28   HS TNI 2HR ng/L 23  --    HSTNI D2 ng/L -5  --          Results from last 7 days   Lab Units 03/03/24  1155   PROTIME seconds 13.8   INR  1.07   PTT seconds 30         Results from last 7 days   Lab Units 03/03/24  1155   PROCALCITONIN ng/ml 0.06     Results from last 7 days   Lab Units 03/03/24  1155   LACTIC ACID mmol/L 1.2       Results from last 7 days   Lab Units 03/03/24  1155   LIPASE u/L 9*     Results from last 7 days   Lab Units 03/03/24  1652   CRP mg/L 11.9*   SED RATE mm/hour 50*             Results from last 7 days   Lab Units 03/03/24  2350   CLARITY UA  Clear   COLOR UA  Light Yellow   SPEC GRAV UA  >=1.050*   PH UA  5.5   GLUCOSE UA mg/dl Negative   KETONES UA mg/dl Negative   BLOOD UA  Negative   PROTEIN UA mg/dl 30 (1+)*   NITRITE UA  Negative   BILIRUBIN UA  Negative   UROBILINOGEN UA (BE) mg/dl <2.0   LEUKOCYTES UA  Negative   WBC UA /hpf None Seen   RBC UA /hpf 4-10*   BACTERIA UA /hpf Occasional   EPITHELIAL CELLS WET PREP /hpf None Seen   MUCUS THREADS  Occasional*     Results from last 7 days   Lab Units 03/03/24  2349   C DIFF TOXIN B BY PCR  Negative     Results from last 7 days   Lab Units 03/03/24  2349   SALMONELLA SP PCR  Negative   SHIGELLA SP/ENTEROINVASIVE E. COLI (EIEC)  Negative   CAMPYLOBACTER SP (JEJUNI AND COLI)  Negative   SHIGA TOXIN 1/SHIGA TOXIN 2  Negative         Results from last 7 days   Lab Units 03/03/24  1155   BLOOD CULTURE  Received in Microbiology Lab. Culture in Progress.  Received in Microbiology Lab. Culture  in Progress.           ED Treatment:   Medication Administration from 03/03/2024 0949 to 03/03/2024 1618         Date/Time Order Dose Route Action Comments     03/03/2024 1158 EST sodium chloride 0.9 % bolus 500 mL 500 mL Intravenous New Bag --     03/03/2024 1158 EST ondansetron (ZOFRAN) injection 4 mg 4 mg Intravenous Given --     03/03/2024 1430 EST magnesium sulfate 2 g/50 mL IVPB (premix) 2 g 2 g Intravenous New Bag --          Past Medical History:   Diagnosis Date    DEISI (acute kidney injury) ([facility])     Chronic HFrEF (heart failure with reduced ejection fraction) ([facility]) 2024    Coronary artery disease     Hypertension     Prediabetes     S/P CABG x 2 02/15/2024    S/P mitral valve repair 02/15/2024     Present on Admission:   Severe protein-calorie malnutrition ([facility])   HTN (hypertension)   Chronic HFrEF (heart failure with reduced ejection fraction) ([facility])      Admitting Diagnosis: Diarrhea [R19.7]  Hypomagnesemia [E83.42]  Hyponatremia [E87.1]  Fatigue [R53.83]  Weakness [R53.1]  Failure to thrive in adult [R62.7]  Known medical problems [Z78.9]  Age/Sex: 63 y.o. male  Admission Orders:  Tele  Up and oOB  Scds    Scheduled Medications:  aspirin, 325 mg, Oral, Daily  atorvastatin, 80 mg, Oral, Daily With Dinner  heparin (porcine), 5,000 Units, Subcutaneous, Q8H JENN  magnesium sulfate, 2 g, Intravenous, Once  melatonin, 3 mg, Oral, HS  metoprolol tartrate, 25 mg, Oral, Q12H JENN  pantoprazole, 40 mg, Oral, Daily  vancomycin oral (capsules or solution), 125 mg, Oral, Q6H JENN      Continuous IV Infusions:     PRN Meds:  ondansetron, 4 mg, Intravenous, Q6H PRN  oxyCODONE, 2.5 mg, Oral, Q6H PRN        IP CONSULT TO GASTROENTEROLOGY  IP CONSULT TO NUTRITION SERVICES    Network Utilization Review Department  ATTENTION: Please call with any questions or concerns to [phone] and carefully listen to the prompts so that you are directed to the right person. All voicemails are confidential.   For Discharge needs, contact  Care Management DC Support Team at [phone] opt. [phone]  Send all requests for admission clinical reviews, approved or denied determinations and any other requests to dedicated fax number below belonging to the campus where the patient is receiving treatment. List of dedicated fax numbers for the Facilities:  FACILITY NAME UR FAX NUMBER   ADMISSION DENIALS (Administrative/Medical Necessity) [phone]   DISCHARGE SUPPORT TEAM (NETWORK) [phone]   PARENT CHILD HEALTH (Maternity/NICU/Pediatrics) [phone]   [facility] [phone]   [facility] [phone]   [facility] [phone]   [facility] [phone]   [facility] [phone]   [facility] [phone]   [facility] [phone]   [facility] [phone]   [facility] [phone]   [facility] [phone]   [facility] [phone]   [facility] [phone]

## 2024-03-05 ENCOUNTER — APPOINTMENT (INPATIENT)
Dept: RADIOLOGY | Facility: HOSPITAL | Age: 64
DRG: 249 | End: 2024-03-05
Payer: COMMERCIAL

## 2024-03-05 LAB
ANION GAP SERPL CALCULATED.3IONS-SCNC: 7 MMOL/L
BUN SERPL-MCNC: 10 MG/DL (ref 5–25)
CALCIUM SERPL-MCNC: 8.2 MG/DL (ref 8.4–10.2)
CHLORIDE SERPL-SCNC: 104 MMOL/L (ref 96–108)
CO2 SERPL-SCNC: 24 MMOL/L (ref 21–32)
CREAT SERPL-MCNC: 0.67 MG/DL (ref 0.6–1.3)
ERYTHROCYTE [DISTWIDTH] IN BLOOD BY AUTOMATED COUNT: 13.2 % (ref 11.6–15.1)
GFR SERPL CREATININE-BSD FRML MDRD: 102 ML/MIN/1.73SQ M
GLUCOSE SERPL-MCNC: 104 MG/DL (ref 65–140)
HCT VFR BLD AUTO: 31.2 % (ref 36.5–49.3)
HGB BLD-MCNC: 10.1 G/DL (ref 12–17)
MAGNESIUM SERPL-MCNC: 2 MG/DL (ref 1.9–2.7)
MCH RBC QN AUTO: 29 PG (ref 26.8–34.3)
MCHC RBC AUTO-ENTMCNC: 32.4 G/DL (ref 31.4–37.4)
MCV RBC AUTO: 90 FL (ref 82–98)
PHOSPHATE SERPL-MCNC: 2.8 MG/DL (ref 2.3–4.1)
PLATELET # BLD AUTO: 461 THOUSANDS/UL (ref 149–390)
PMV BLD AUTO: 8.6 FL (ref 8.9–12.7)
POTASSIUM SERPL-SCNC: 3.5 MMOL/L (ref 3.5–5.3)
RBC # BLD AUTO: 3.48 MILLION/UL (ref 3.88–5.62)
SODIUM SERPL-SCNC: 135 MMOL/L (ref 135–147)
WBC # BLD AUTO: 8.48 THOUSAND/UL (ref 4.31–10.16)
WBC SPEC QL GRAM STN: NORMAL

## 2024-03-05 PROCEDURE — 99232 SBSQ HOSP IP/OBS MODERATE 35: CPT | Performed by: INTERNAL MEDICINE

## 2024-03-05 PROCEDURE — 85027 COMPLETE CBC AUTOMATED: CPT | Performed by: PHYSICIAN ASSISTANT

## 2024-03-05 PROCEDURE — 80048 BASIC METABOLIC PNL TOTAL CA: CPT | Performed by: PHYSICIAN ASSISTANT

## 2024-03-05 PROCEDURE — 84100 ASSAY OF PHOSPHORUS: CPT | Performed by: PHYSICIAN ASSISTANT

## 2024-03-05 PROCEDURE — 71046 X-RAY EXAM CHEST 2 VIEWS: CPT

## 2024-03-05 PROCEDURE — 83993 ASSAY FOR CALPROTECTIN FECAL: CPT | Performed by: FAMILY MEDICINE

## 2024-03-05 PROCEDURE — 83735 ASSAY OF MAGNESIUM: CPT | Performed by: PHYSICIAN ASSISTANT

## 2024-03-05 RX ADMIN — ATORVASTATIN CALCIUM 80 MG: 80 TABLET, FILM COATED ORAL at 16:49

## 2024-03-05 RX ADMIN — HEPARIN SODIUM 5000 UNITS: 5000 INJECTION INTRAVENOUS; SUBCUTANEOUS at 14:50

## 2024-03-05 RX ADMIN — ASPIRIN 325 MG ORAL TABLET 325 MG: 325 PILL ORAL at 08:22

## 2024-03-05 RX ADMIN — PANTOPRAZOLE SODIUM 40 MG: 40 TABLET, DELAYED RELEASE ORAL at 08:22

## 2024-03-05 RX ADMIN — MELATONIN 3 MG: at 21:32

## 2024-03-05 RX ADMIN — METOPROLOL TARTRATE 25 MG: 25 TABLET, FILM COATED ORAL at 21:32

## 2024-03-05 RX ADMIN — HEPARIN SODIUM 5000 UNITS: 5000 INJECTION INTRAVENOUS; SUBCUTANEOUS at 05:00

## 2024-03-05 RX ADMIN — HEPARIN SODIUM 5000 UNITS: 5000 INJECTION INTRAVENOUS; SUBCUTANEOUS at 21:32

## 2024-03-05 NOTE — PROGRESS NOTES
Patient:    MRN:  [id number]    Aidin Request ID:  [id number]    Level of care reserved:  Home Health Agency    Partner Reserved:  [facility] [place] [address] [phone]    Clinical needs requested:    Geography searched:  [phone]    Start of Service:    Request sent:  12:59pm EST on 3/4/2024 by Hilary Bustos    Partner reserved:  12:34pm EST on 3/5/2024 by Hilary Bustos    Choice list shared:  10:45am EST on 3/5/2024 by Hilary Bustos

## 2024-03-05 NOTE — CASE MANAGEMENT
Case Management Discharge Planning Note    Patient name Rohan Don  Location [facility] 421-01 MRN [id number]  : 1960 Date 3/5/2024       Current Admission Date: 3/3/2024  Current Admission Diagnosis:Diarrhea   Patient Active Problem List    Diagnosis Date Noted    SIRS (systemic inflammatory response syndrome) (HCC) 2024    Postprocedural pneumothorax 2024    Diarrhea 2024    Failure to thrive in adult 2024    Hypomagnesemia 2024    S/P CABG x 2 02/15/2024    S/P MVR (mitral valve repair) 02/15/2024    Ischemic cardiomyopathy 02/15/2024    Severe left ventricular systolic dysfunction 02/15/2024    Nodule of upper lobe of right lung 2024    Renal calculi 2024    Coronary artery disease 2024    Severe protein-calorie malnutrition (HCC) 2024    Chronic HFrEF (heart failure with reduced ejection fraction) ([facility]) 02/10/2024    Prediabetes 2024    Elevated transaminase level 2024    Pleural effusion 2024    HTN (hypertension) 2019    Vitamin D deficiency 2014      LOS (days): 2  Geometric Mean LOS (GMLOS) (days):   Days to GMLOS:     OBJECTIVE:  Risk of Unplanned Readmission Score: 18.31         Current admission status: Inpatient   [facility]   [id number] - [place], [place] - [address] [address]  Phone: [phone] Fax: [phone]    Primary Care Provider: Natanael Jenkins MD    Primary Insurance: [facility]  Secondary Insurance:     DISCHARGE DETAILS:    Discharge planning discussed with:: pt at bedside  Corder of Choice: Yes  Comments - Freedom of Choice: CM reviewed pt choice list with pt at bedside . [facility] reserved for nursing/PT. CM will continue to follow as needed.  CM contacted family/caregiver?: Yes  Were Treatment Team discharge recommendations reviewed with patient/caregiver?: Yes  Did patient/caregiver verbalize understanding of patient care needs?:  Yes  Were patient/caregiver advised of the risks associated with not following Treatment Team discharge recommendations?: Yes    Contacts  Patient Contacts: Teresita Don (Spouse)   [phone] (M)  Relationship to Patient:: Family  Contact Method: Phone  Phone Number: [phone] (N)  Reason/Outcome: Continuity of Care, Emergency Contact, Discharge Planning    Requested Home Health Care         Is the patient interested in HHC at discharge?: Yes  Home Health Discipline requested:: Nursing, Physical Therapy  Home Health Agency Name:: [facility]  HHA External Referral Reason (only applicable if external HHA name selected): Services not provided in network or near patient location  Home Health Follow-Up Provider:: PCP  Home Health Services Needed:: Post-Op Care and Assessment, Heart Failure Management, Evaluate Functional Status and Safety, Gait/ADL Training, Strengthening/Theraputic Exercises to Improve Function  Homebound Criteria Met:: Requires the Assistance of Another Person for Safe Ambulation or to Leave the Home  Supporting Clincal Findings:: Limited Endurance, Fatigues Easliy in Short Distances    DME Referral Provided  Referral made for DME?: No    Other Referral/Resources/Interventions Provided:  Interventions: HHC  Referral Comments: [facility] reserved for nursing/PT.    Would you like to participate in our [facility] service program?  : No - Declined    Treatment Team Recommendation: Home with Home Health Care  Discharge Destination Plan:: Home with Home Health Care  Transport at Discharge : Family                                      Additional Comments: CM reviewed pt choice list with pt at bedside . [facility] reserved for nursing/PT. CM will continue to follow as needed.

## 2024-03-05 NOTE — UTILIZATION REVIEW
Continued Stay Review    Date: 3/5                          Current Patient Class: Inpatient  Current Level of Care: Med Surg    HPI:63 y.o. male initially admitted on 3/3     Assessment/Plan:   Diarrhea improving. Continue diary free diet.   Pt requiring continued stay for management of diarrhea. None overnight.  Appetite improving.     Per CM notes; Home with [facility] when medically cleared    Vital Signs:   03/05/24 11:07:43 97.5 °F (36.4 °C) 82 16 118/69 85 97 % -- --   03/05/24 0821 -- -- -- 107/63 -- -- -- --   03/05/24 07:09:26 98 °F (36.7 °C) 83 16 117/70 86 96 % -- --   03/05/24 02:33:30 97.6 °F (36.4 °C) 80 18 119/68 85 95 % --      Pertinent Labs/Diagnostic Results:   3/5  CXR - No acute cardiopulmonary disease.       Results from last 7 days   Lab Units 03/05/24  0539 03/04/24  0436 03/03/24  1652 03/03/24  1155   WBC Thousand/uL 8.48 13.67*  --  12.31*   HEMOGLOBIN g/dL 10.1* 11.0*  --  13.0   HEMATOCRIT % 31.2* 34.3*  --  40.7   PLATELETS Thousands/uL 461* 529* 591* 746*   NEUTROS ABS Thousands/µL  --   --   --  9.28*         Results from last 7 days   Lab Units 03/05/24  0539 03/04/24  0436 03/03/24  1155   SODIUM mmol/L 135 131* 133*   POTASSIUM mmol/L 3.5 3.6 3.6   CHLORIDE mmol/L 104 102 100   CO2 mmol/L 24 21 24   ANION GAP mmol/L 7 8 9   BUN mg/dL 10 15 15   CREATININE mg/dL 0.67 0.77 0.88   EGFR ml/min/1.73sq m 102 97 92   CALCIUM mg/dL 8.2* 8.4 9.9   MAGNESIUM mg/dL 2.0 1.7* 1.5*   PHOSPHORUS mg/dL 2.8 3.1 3.1     Results from last 7 days   Lab Units 03/03/24  1155   AST U/L 17   ALT U/L 17   ALK PHOS U/L 107*   TOTAL PROTEIN g/dL 8.1   ALBUMIN g/dL 4.1   TOTAL BILIRUBIN mg/dL 1.00     Results from last 7 days   Lab Units 03/03/24  1106   POC GLUCOSE mg/dl 103     Results from last 7 days   Lab Units 03/05/24  0539 03/04/24  0436 03/03/24  1155   GLUCOSE RANDOM mg/dL 104 127 100       Results from last 7 days   Lab Units 03/03/24  1326 03/03/24  1155   HS TNI 0HR ng/L  --  28   HS TNI 2HR ng/L 23   --    HSTNI D2 ng/L -5  --          Results from last 7 days   Lab Units 03/03/24  1155   PROTIME seconds 13.8   INR  1.07   PTT seconds 30         Results from last 7 days   Lab Units 03/03/24  1155   PROCALCITONIN ng/ml 0.06     Results from last 7 days   Lab Units 03/03/24  1155   LACTIC ACID mmol/L 1.2       Results from last 7 days   Lab Units 03/03/24  1155   LIPASE u/L 9*     Results from last 7 days   Lab Units 03/03/24  1652   CRP mg/L 11.9*   SED RATE mm/hour 50*             Results from last 7 days   Lab Units 03/03/24  2350   CLARITY UA  Clear   COLOR UA  Light Yellow   SPEC GRAV UA  >=1.050*   PH UA  5.5   GLUCOSE UA mg/dl Negative   KETONES UA mg/dl Negative   BLOOD UA  Negative   PROTEIN UA mg/dl 30 (1+)*   NITRITE UA  Negative   BILIRUBIN UA  Negative   UROBILINOGEN UA (BE) mg/dl <2.0   LEUKOCYTES UA  Negative   WBC UA /hpf None Seen   RBC UA /hpf 4-10*   BACTERIA UA /hpf Occasional   EPITHELIAL CELLS WET PREP /hpf None Seen   MUCUS THREADS  Occasional*         Results from last 7 days   Lab Units 03/03/24  2349   C DIFF TOXIN B BY PCR  Negative     Results from last 7 days   Lab Units 03/03/24  2349   SALMONELLA SP PCR  Negative   SHIGELLA SP/ENTEROINVASIVE E. COLI (EIEC)  Negative   CAMPYLOBACTER SP (JEJUNI AND COLI)  Negative   SHIGA TOXIN 1/SHIGA TOXIN 2  Negative         Results from last 7 days   Lab Units 03/03/24  1155   BLOOD CULTURE  No Growth at 24 hrs.  No Growth at 24 hrs.       Medications:   Scheduled Medications:  aspirin, 325 mg, Oral, Daily  atorvastatin, 80 mg, Oral, Daily With Dinner  heparin (porcine), 5,000 Units, Subcutaneous, Q8H JENN  melatonin, 3 mg, Oral, HS  metoprolol tartrate, 25 mg, Oral, Q12H JENN  pantoprazole, 40 mg, Oral, Daily      Continuous IV Infusions: None     PRN Meds:  loperamide, 2 mg, Oral, 4x Daily PRN  ondansetron, 4 mg, Intravenous, Q6H PRN  oxyCODONE, 2.5 mg, Oral, Q6H PRN        Discharge Plan: Home with [facility] when medically cleared.    Network  Utilization Review Department  ATTENTION: Please call with any questions or concerns to [phone] and carefully listen to the prompts so that you are directed to the right person. All voicemails are confidential.   For Discharge needs, contact Care Management DC Support Team at [phone] opt. [phone]  Send all requests for admission clinical reviews, approved or denied determinations and any other requests to dedicated fax number below belonging to the campus where the patient is receiving treatment. List of dedicated fax numbers for the Facilities:  FACILITY NAME UR FAX NUMBER   ADMISSION DENIALS (Administrative/Medical Necessity) [phone]   DISCHARGE SUPPORT TEAM (NETWORK) [phone]   PARENT CHILD HEALTH (Maternity/NICU/Pediatrics) [phone]   [facility] [phone]   [facility] [phone]   [facility] [phone]   [facility] [phone]   [facility] [phone]   [facility] [phone]   [facility] [phone]   [facility] [phone]   [facility] [phone]   [facility] [phone]   [facility] [phone]   [facility] [phone]

## 2024-03-05 NOTE — RESTORATIVE TECHNICIAN NOTE
Restorative Technician Note      Patient Name: Rohan Don     [facility] Tech Visit Date: 03/05/24  Note Type: Mobility  Patient Position Upon Consult: Supine  Activity Performed: Ambulated  Assistive Device: Other (Comment) (none)  Patient Position at End of Consult: All needs within reach; Other (comment) (stretcher)

## 2024-03-05 NOTE — ASSESSMENT & PLAN NOTE
Patient reports of ongoing diarrhea requiring Imodium since he had CABG 3 weeks ago ago. Has anorexia. Lost 15 lbs in 1 month.   No fever, abdominal pain associated. No melena/hematochezia.  WBC 12.  Lipase 9.  Negative stool study  CT Abd/pelvis with PO/IV contrast - small bowel enterocolitis  Evaluated by GI suspect viral versus antibiotic associated diarrhea improving  Improving  Continue with supportive care and dairy free diet

## 2024-03-05 NOTE — ASSESSMENT & PLAN NOTE
Malnutrition Findings:   Adult Malnutrition type: Acute illness  Adult Degree of Malnutrition: Other severe protein calorie malnutrition  Malnutrition Characteristics: Fat loss, Muscle loss, Weight loss                  360 Statement: severe malnutrition r/t acute illness as evidenced by severe muscle loss temples and around clavicles, moderate muscle loss shoulders, mild-moderate fat loss in buccal and tricep fat pads; Weight loss of 16 lbs / 12% since last discharge on 2/21/24 (x approximately 2 weeks). Treatment: oral diet and oral nutrition supplements    BMI Findings:  Adult BMI Classifications: Underweight < 18.5        Body mass index is 16.45 kg/m².   \  Secondary to diarrhea and anorexia.   Lost 15 lbs   Continue with cardiac diet and nutrition supplement

## 2024-03-05 NOTE — MALNUTRITION/BMI
This medical record reflects one or more clinical indicators suggestive of malnutrition.    Malnutrition Findings:   Adult Malnutrition type: Acute illness  Adult Degree of Malnutrition: Other severe protein calorie malnutrition  Malnutrition Characteristics: Fat loss, Muscle loss, Weight loss                  360 Statement: severe malnutrition r/t acute illness as evidenced by severe muscle loss temples and around clavicles, moderate muscle loss shoulders, mild-moderate fat loss in buccal and tricep fat pads; Weight loss of 16 lbs / 12% since last discharge on 2/21/24 (x approximately 2 weeks). Treatment: oral diet and oral nutrition supplements    BMI Findings:  Adult BMI Classifications: Underweight < 18.5        Body mass index is 16.45 kg/m².     See Nutrition note dated 3/4/24 for additional details.  Completed nutrition assessment is viewable in the nutrition documentation.

## 2024-03-05 NOTE — PROGRESS NOTES
[facility]  Progress Note  Name: Rohan Don I  MRN: [id number]  Unit/Bed#: [facility] 421-01 I Date of Admission: 3/3/2024   Date of Service: 3/5/2024 I Hospital Day: 2    Assessment/Plan   * Diarrhea  Assessment & Plan  Patient reports of ongoing diarrhea requiring Imodium since he had CABG 3 weeks ago ago. Has anorexia. Lost 15 lbs in 1 month.   No fever, abdominal pain associated. No melena/hematochezia.  WBC 12.  Lipase 9.  Negative stool study  CT Abd/pelvis with PO/IV contrast - small bowel enterocolitis  Evaluated by GI suspect viral versus antibiotic associated diarrhea improving  Improving  Continue with supportive care and dairy free diet      S/P CABG x 2  Assessment & Plan  S/P CABG to RCA, LIMA to LAD on 2/15/24  Continue full dose of aspirin, statin, metoprolol. Holding lisinopril for low BP.      Chronic HFrEF (heart failure with reduced ejection fraction) (HCC)  Assessment & Plan  Wt Readings from Last 3 Encounters:   03/04/24 53.5 kg (117 lb 15.1 oz)   02/27/24 55.7 kg (122 lb 14.4 oz)   02/26/24 56.3 kg (124 lb 3.2 oz)     Last Echo showed EF 25%. Currently wearing lifevest.  Patient was taken off of diuretics by Heart failure team OP.  Follows with Dr Dandre Cummins.   Fluid restriction.          Postprocedural pneumothorax  Assessment & Plan  Minuscule ptx noted on CXR. Suspect sequela from CT surgery. No respiratory symptoms. On room air.   No pneumothorax on repeat chest x-ray today    Severe protein-calorie malnutrition (HCC)  Assessment & Plan  Malnutrition Findings:   Adult Malnutrition type: Acute illness  Adult Degree of Malnutrition: Other severe protein calorie malnutrition  Malnutrition Characteristics: Fat loss, Muscle loss, Weight loss                  360 Statement: severe malnutrition r/t acute illness as evidenced by severe muscle loss temples and around clavicles, moderate muscle loss shoulders, mild-moderate fat loss in buccal and tricep fat  pads; Weight loss of 16 lbs / 12% since last discharge on 2/21/24 (x approximately 2 weeks). Treatment: oral diet and oral nutrition supplements    BMI Findings:  Adult BMI Classifications: Underweight < 18.5        Body mass index is 16.45 kg/m².   \  Secondary to diarrhea and anorexia.   Lost 15 lbs   Continue with cardiac diet and nutrition supplement    HTN (hypertension)  Assessment & Plan  Low BP in ED.  Hold home lisinopril.  Continue PO Metoprolol with holding parameters             VTE Pharmacologic Prophylaxis:   High Risk (Score >/= 5) - Pharmacological DVT Prophylaxis Ordered: heparin. Sequential Compression Devices Ordered.    Mobility:   Basic Mobility Inpatient Raw Score: 24  JH-HLM Goal: 8: Walk 250 feet or more  JH-HLM Achieved: 7: Walk 25 feet or more  HLM Goal NOT achieved. Continue with multidisciplinary rounding and encourage appropriate mobility to improve upon HLM goals.    Patient Centered Rounds: I performed bedside rounds with nursing staff today.   Discussions with Specialists or Other Care Team Provider: CM    Education and Discussions with Family / Patient: Patient declined call to .     Total Time Spent on Date of Encounter in care of patient: 35 mins. This time was spent on one or more of the following: performing physical exam; counseling and coordination of care; obtaining or reviewing history; documenting in the medical record; reviewing/ordering tests, medications or procedures; communicating with other healthcare professionals and discussing with patient's family/caregivers.    Current Length of Stay: 2 day(s)  Current Patient Status: Inpatient   Certification Statement: The patient will continue to require additional inpatient hospital stay due to management of diarrhea  Discharge Plan: Anticipate discharge tomorrow to home.    Code Status: Level 1 - Full Code    Subjective:   Patient seen and examined  Comfortable in bed  No event overnight  No further  diarrhea  Appetite is improving    Objective:     Vitals:   Temp (24hrs), Av.9 °F (36.6 °C), Min:97.5 °F (36.4 °C), Max:98.4 °F (36.9 °C)    Temp:  [97.5 °F (36.4 °C)-98.4 °F (36.9 °C)] 97.5 °F (36.4 °C)  HR:  [79-90] 82  Resp:  [16-18] 16  BP: ()/(55-70) 118/69  SpO2:  [95 %-97 %] 97 %  Body mass index is 16.45 kg/m².     Input and Output Summary (last 24 hours):     Intake/Output Summary (Last 24 hours) at 3/5/2024 1208  Last data filed at 3/5/2024 1204  Gross per 24 hour   Intake 718 ml   Output 1400 ml   Net -682 ml       Physical Exam:   Physical Exam  Vitals reviewed.   Constitutional:       Appearance: Normal appearance. He is not ill-appearing.      Comments: Thin male   HENT:      Head: Normocephalic and atraumatic.      Mouth/Throat:      Mouth: Mucous membranes are moist.      Pharynx: No oropharyngeal exudate.   Eyes:      General: No scleral icterus.     Extraocular Movements: Extraocular movements intact.   Cardiovascular:      Rate and Rhythm: Normal rate and regular rhythm.      Pulses: Normal pulses.      Heart sounds: Normal heart sounds. No murmur heard.  Pulmonary:      Effort: Pulmonary effort is normal. No respiratory distress.      Breath sounds: Normal breath sounds. No wheezing.   Abdominal:      General: Bowel sounds are normal. There is no distension.      Palpations: Abdomen is soft.      Tenderness: There is no abdominal tenderness.   Musculoskeletal:         General: Normal range of motion.      Cervical back: Normal range of motion and neck supple.      Right lower leg: No edema.      Left lower leg: No edema.   Skin:     General: Skin is warm and dry.   Neurological:      General: No focal deficit present.      Mental Status: He is alert and oriented to person, place, and time.      Cranial Nerves: No cranial nerve deficit.   Psychiatric:         Mood and Affect: Mood normal.            Additional Data:     Labs:  Results from last 7 days   Lab Units 24  0539  03/03/24  1652 03/03/24  1155   WBC Thousand/uL 8.48   < > 12.31*   HEMOGLOBIN g/dL 10.1*   < > 13.0   HEMATOCRIT % 31.2*   < > 40.7   PLATELETS Thousands/uL 461*   < > 746*   NEUTROS PCT %  --   --  75   LYMPHS PCT %  --   --  12*   MONOS PCT %  --   --  10   EOS PCT %  --   --  2    < > = values in this interval not displayed.     Results from last 7 days   Lab Units 03/05/24  0539 03/04/24  0436 03/03/24  1155   SODIUM mmol/L 135   < > 133*   POTASSIUM mmol/L 3.5   < > 3.6   CHLORIDE mmol/L 104   < > 100   CO2 mmol/L 24   < > 24   BUN mg/dL 10   < > 15   CREATININE mg/dL 0.67   < > 0.88   ANION GAP mmol/L 7   < > 9   CALCIUM mg/dL 8.2*   < > 9.9   ALBUMIN g/dL  --   --  4.1   TOTAL BILIRUBIN mg/dL  --   --  1.00   ALK PHOS U/L  --   --  107*   ALT U/L  --   --  17   AST U/L  --   --  17   GLUCOSE RANDOM mg/dL 104   < > 100    < > = values in this interval not displayed.     Results from last 7 days   Lab Units 03/03/24  1155   INR  1.07     Results from last 7 days   Lab Units 03/03/24  1106   POC GLUCOSE mg/dl 103         Results from last 7 days   Lab Units 03/03/24  1155   LACTIC ACID mmol/L 1.2   PROCALCITONIN ng/ml 0.06       Lines/Drains:  Invasive Devices       Peripheral Intravenous Line  Duration             Peripheral IV 03/03/24 Right Antecubital 2 days                      Telemetry:  Telemetry Orders (From admission, onward)               24 Hour Telemetry Monitoring  Continuous x 24 Hours (Telem)        Comments: CABG one month ago, has lifevest   Question:  Reason for 24 Hour Telemetry  Answer:  PCI/EP study (including pacer and ICD implementation), Cardiac surgery, MI, abnormal cardiac cath, and chest pain- rule out MI                     Telemetry Reviewed:  yes  Indication for Continued Telemetry Use: Monitoring for arrhythmia.              Imaging: No pertinent imaging reviewed.    Recent Cultures (last 7 days):   Results from last 7 days   Lab Units 03/03/24  2349 03/03/24  1155   BLOOD  CULTURE   --  No Growth at 24 hrs.  No Growth at 24 hrs.   C DIFF TOXIN B BY PCR  Negative  --        Last 24 Hours Medication List:   Current Facility-Administered Medications   Medication Dose Route Frequency Provider Last Rate    aspirin  325 mg Oral Daily Hardy Monreal MD      atorvastatin  80 mg Oral Daily With Dinner Hardy Monreal MD      heparin (porcine)  5,000 Units Subcutaneous Q8H JENN Monreal MD      loperamide  2 mg Oral 4x Daily PRN Jyoti Rosas PA-C      melatonin  3 mg Oral HS Hardy Monreal MD      metoprolol tartrate  25 mg Oral Q12H [facility] Hardy Monreal MD      ondansetron  4 mg Intravenous Q6H PRN Hardy Monreal MD      oxyCODONE  2.5 mg Oral Q6H PRN Hardy Monreal MD      pantoprazole  40 mg Oral Daily Hardy Monreal MD          Today, Patient Was Seen By: Natanael King DO    **Please Note: This note may have been constructed using a voice recognition system.**

## 2024-03-05 NOTE — ASSESSMENT & PLAN NOTE
Minuscule ptx noted on CXR. Suspect sequela from CT surgery. No respiratory symptoms. On room air.   No pneumothorax on repeat chest x-ray today

## 2024-03-06 VITALS
TEMPERATURE: 98.1 F | BODY MASS INDEX: 16.51 KG/M2 | SYSTOLIC BLOOD PRESSURE: 138 MMHG | HEIGHT: 71 IN | RESPIRATION RATE: 18 BRPM | HEART RATE: 88 BPM | DIASTOLIC BLOOD PRESSURE: 60 MMHG | WEIGHT: 117.95 LBS | OXYGEN SATURATION: 97 %

## 2024-03-06 LAB
FOLATE SERPL-MCNC: >22.3 NG/ML
VIT B12 SERPL-MCNC: 351 PG/ML (ref 180–914)

## 2024-03-06 PROCEDURE — 82746 ASSAY OF FOLIC ACID SERUM: CPT | Performed by: INTERNAL MEDICINE

## 2024-03-06 PROCEDURE — 99239 HOSP IP/OBS DSCHRG MGMT >30: CPT | Performed by: INTERNAL MEDICINE

## 2024-03-06 PROCEDURE — 82607 VITAMIN B-12: CPT | Performed by: INTERNAL MEDICINE

## 2024-03-06 RX ADMIN — PANTOPRAZOLE SODIUM 40 MG: 40 TABLET, DELAYED RELEASE ORAL at 09:53

## 2024-03-06 RX ADMIN — HEPARIN SODIUM 5000 UNITS: 5000 INJECTION INTRAVENOUS; SUBCUTANEOUS at 06:36

## 2024-03-06 RX ADMIN — ASPIRIN 325 MG ORAL TABLET 325 MG: 325 PILL ORAL at 09:53

## 2024-03-06 RX ADMIN — Medication 2.5 MG: at 00:45

## 2024-03-06 RX ADMIN — METOPROLOL TARTRATE 25 MG: 25 TABLET, FILM COATED ORAL at 09:53

## 2024-03-06 NOTE — ASSESSMENT & PLAN NOTE
Minuscule ptx noted on CXR. Suspect sequela from CT surgery. No respiratory symptoms. On room air.   No pneumothorax on repeat chest x-ray

## 2024-03-06 NOTE — DISCHARGE SUMMARY
[facility]  Discharge- Rohan Don 1960, 63 y.o. male MRN: [id number]  Unit/Bed#: [facility] 421-01 Encounter: [id number]  Primary Care Provider: Natanael Jenkins MD   Date and time admitted to hospital: 3/3/2024 10:44 AM    * Diarrhea  Assessment & Plan  Patient reports of ongoing diarrhea requiring Imodium since he had CABG 3 weeks ago ago. Has anorexia. Lost 15 lbs in 1 month.   No fever, abdominal pain associated. No melena/hematochezia.  WBC 12.  Lipase 9.  Negative stool study  CT Abd/pelvis with PO/IV contrast - small bowel enterocolitis  Evaluated by GI suspect viral versus antibiotic associated diarrhea improving  Resolved  Continue with supportive care and dairy free diet      S/P CABG x 2  Assessment & Plan  S/P CABG to RCA, LIMA to LAD on 2/15/24  Continue full dose of aspirin, statin, metoprolol. Holding lisinopril for low BP.  Resume ACE inhibitor after discharge      Chronic HFrEF (heart failure with reduced ejection fraction) (HCC)  Assessment & Plan  Wt Readings from Last 3 Encounters:   03/04/24 53.5 kg (117 lb 15.1 oz)   02/27/24 55.7 kg (122 lb 14.4 oz)   02/26/24 56.3 kg (124 lb 3.2 oz)     Last Echo showed EF 25%. Currently wearing lifevest.  Patient was taken off of diuretics by Heart failure team OP.  Follows with Dr Dandre Cummins.   Fluid restriction.          Postprocedural pneumothorax  Assessment & Plan  Minuscule ptx noted on CXR. Suspect sequela from CT surgery. No respiratory symptoms. On room air.   No pneumothorax on repeat chest x-ray    Severe protein-calorie malnutrition (HCC)  Assessment & Plan  Malnutrition Findings:   Adult Malnutrition type: Acute illness  Adult Degree of Malnutrition: Other severe protein calorie malnutrition  Malnutrition Characteristics: Fat loss, Muscle loss, Weight loss                  360 Statement: severe malnutrition r/t acute illness as evidenced by severe muscle loss temples and around clavicles, moderate muscle  loss shoulders, mild-moderate fat loss in buccal and tricep fat pads; Weight loss of 16 lbs / 12% since last discharge on 2/21/24 (x approximately 2 weeks). Treatment: oral diet and oral nutrition supplements    BMI Findings:  Adult BMI Classifications: Underweight < 18.5        Body mass index is 16.45 kg/m².   \  Secondary to diarrhea and anorexia.   Lost 15 lbs   Continue with cardiac diet and nutrition supplement    HTN (hypertension)  Assessment & Plan  Low BP in ED.  Hold home lisinopril.  Continue PO Metoprolol with holding parameters  Resume lisinopril after discharge      Medical Problems       Resolved Problems  Date Reviewed: 3/6/2024   None       Discharging Physician / Practitioner: Ntaanael King DO  PCP: Natanael Jenkins MD  Admission Date:   Admission Orders (From admission, onward)       Ordered        03/03/24 1432  Inpatient Admission  Once            03/03/24 1420  Place in Observation  Once,   Status:  Canceled            03/03/24 1414  INPATIENT ADMISSION  Once,   Status:  Canceled                          Discharge Date: 03/06/24    Consultations During Hospital Stay:  GI    Procedures Performed:   CT abdomen pelvis Fluid-filled distal small bowel with air-fluid levels in the colon compatible with a nonspecific enterocolitis.   Normal chest x-ray    Significant Findings / Test Results:   As above    Incidental Findings:   NONE    Test Results Pending at Discharge (will require follow up):   Fecal calprotectin     Outpatient Tests Requested:  none    Complications:  none    Reason for Admission:   Acute diarrhea    Hospital Course:   Rohan Don is a 63 y.o. male patient who originally presented to the hospital on 3/3/2024 due to acute diarrhea and failure to thrive  Patient with PMH of chronic systolic CHF, ischemic cardiomyopathy, EF of 25% on LifeVest, status post CABG x 2 3 weeks ago, presented to ED with complaint of ongoing diarrhea, lack of appetite, and loss of 15 pounds in the last 3  "weeks since the CABG.      CT scan with small bowel enterocolitis    Patient was evaluated by GI, suspect, his diarrhea is viral versus antibiotic associated diarrhea  Negative stool study  Treated supportively with conservative management  No further diarrhea  Cleared for discharge home today      Please see above list of diagnoses and related plan for additional information.     Condition at Discharge: stable    Discharge Day Visit / Exam:   Subjective:    Patient seen and examined  Comfortable in bed  No event overnight  No further diarrhea  Vitals: Blood Pressure: 138/60 (03/06/24 0723)  Pulse: 88 (03/06/24 0723)  Temperature: 98.1 °F (36.7 °C) (03/06/24 0723)  Temp Source: Oral (03/06/24 0723)  Respirations: 18 (03/06/24 0723)  Height: 5' 11\" (180.3 cm) (03/03/24 1619)  Weight - Scale: 53.5 kg (117 lb 15.1 oz) (03/04/24 0605)  SpO2: 97 % (03/06/24 0723)  Exam:   Physical Exam   Awake alert oriented in no acute distress  Lung clear to auscultation bilateral  Heart positive S1-S2 no murmur  Abdomen soft nontender  Lower extremities no edema    Discussion with Family: Updated  (wife) via phone.    Discharge instructions/Information to patient and family:   See after visit summary for information provided to patient and family.      Provisions for Follow-Up Care:  See after visit summary for information related to follow-up care and any pertinent home health orders.      Mobility at time of Discharge:   Basic Mobility Inpatient Raw Score: 24  JH-HLM Goal: 8: Walk 250 feet or more  JH-HLM Achieved: 8: Walk 250 feet ot more  HLM Goal achieved. Continue to encourage appropriate mobility.     Disposition:   Home with [facility] Services (Reminder: Complete face to face encounter)    Planned Readmission: no     Discharge Statement:  I spent 35 minutes discharging the patient. This time was spent on the day of discharge. I had direct contact with the patient on the day of discharge. Greater than 50% of the total " time was spent examining patient, answering all patient questions, arranging and discussing plan of care with patient as well as directly providing post-discharge instructions.  Additional time then spent on discharge activities.    Discharge Medications:  See after visit summary for reconciled discharge medications provided to patient and/or family.      **Please Note: This note may have been constructed using a voice recognition system**

## 2024-03-06 NOTE — PLAN OF CARE
Problem: Potential for Falls  Goal: Patient will remain free of falls  Description: INTERVENTIONS:  - Educate patient/family on patient safety including physical limitations  - Instruct patient to call for assistance with activity   - Consult OT/PT to assist with strengthening/mobility   - Keep Call bell within reach  - Keep bed low and locked with side rails adjusted as appropriate  - Keep care items and personal belongings within reach  - Initiate and maintain comfort rounds  - Make Fall Risk Sign visible to staff  - Offer Toileting every 2 Hours, in advance of need  - Initiate/Maintain chair alarm  - Obtain necessary fall risk management equipment:   - Apply yellow socks and bracelet for high fall risk patients  - Consider moving patient to room near nurses station  Outcome: Progressing

## 2024-03-06 NOTE — ASSESSMENT & PLAN NOTE
S/P CABG to RCA, LIMA to LAD on 2/15/24  Continue full dose of aspirin, statin, metoprolol. Holding lisinopril for low BP.  Resume ACE inhibitor after discharge

## 2024-03-06 NOTE — ASSESSMENT & PLAN NOTE
Low BP in ED.  Hold home lisinopril.  Continue PO Metoprolol with holding parameters  Resume lisinopril after discharge

## 2024-03-06 NOTE — ASSESSMENT & PLAN NOTE
Patient reports of ongoing diarrhea requiring Imodium since he had CABG 3 weeks ago ago. Has anorexia. Lost 15 lbs in 1 month.   No fever, abdominal pain associated. No melena/hematochezia.  WBC 12.  Lipase 9.  Negative stool study  CT Abd/pelvis with PO/IV contrast - small bowel enterocolitis  Evaluated by GI suspect viral versus antibiotic associated diarrhea improving  Resolved  Continue with supportive care and dairy free diet

## 2024-03-06 NOTE — PLAN OF CARE
Problem: Potential for Falls  Goal: Patient will remain free of falls  Description: INTERVENTIONS:  - Educate patient/family on patient safety including physical limitations  - Instruct patient to call for assistance with activity   - Consult OT/PT to assist with strengthening/mobility   - Keep Call bell within reach  - Keep bed low and locked with side rails adjusted as appropriate  - Keep care items and personal belongings within reach  - Initiate and maintain comfort rounds  - Make Fall Risk Sign visible to staff  - Offer Toileting every 2 Hours, in advance of need  - Initiate/Maintain alarm  - Obtain necessary fall risk management equipment:   - Apply yellow socks and bracelet for high fall risk patients  - Consider moving patient to room near nurses station  3/6/2024 1021 by Christina Kyle, RN  Outcome: Adequate for Discharge  3/6/2024 0839 by Christina Kyle, RN  Outcome: Progressing

## 2024-03-07 ENCOUNTER — TELEPHONE (OUTPATIENT)
Dept: GASTROENTEROLOGY | Facility: CLINIC | Age: 64
End: 2024-03-07

## 2024-03-07 NOTE — TELEPHONE ENCOUNTER
----- Message from Raf Rogers sent at 3/7/2024  7:50 AM EST -----  Regarding: FW: Follow-up    ----- Message -----  From: Eriberto Tyler DO  Sent: 3/4/2024   3:59 PM EST  To: #  Subject: Follow-up                                        Please schedule outpatient follow-up visit in 1 to 2 months to discuss EGD and colonoscopy for weight loss.

## 2024-03-07 NOTE — TELEPHONE ENCOUNTER
I spoke with the patient in regards to scheduling outpatient follow up visit. He explained they recently received new insurance and is going to double check with them to see if a follow up visit with [facility] will be covered.

## 2024-03-08 LAB
BACTERIA BLD CULT: NORMAL
BACTERIA BLD CULT: NORMAL

## 2024-03-10 LAB — CALPROTECTIN STL-MCNT: 1320 UG/G (ref 0–120)

## 2024-03-14 ENCOUNTER — CLINICAL SUPPORT (OUTPATIENT)
Dept: CARDIAC REHAB | Age: 64
End: 2024-03-14

## 2024-03-14 DIAGNOSIS — Z95.1 S/P CABG X 2: Primary | ICD-10-CM

## 2024-03-14 PROCEDURE — 93797 PHYS/QHP OP CAR RHAB WO ECG: CPT

## 2024-03-14 NOTE — PROGRESS NOTES
"CARDIAC REHABILITATION ASSESSMENT AND INDIVIDUALIZED TREATMENT PLAN  INITIAL       Today's date: 3/14/2024   # of Exercise Sessions Completed: Evaluation   Patient name: oRhan Don      : 1960  Age: 63 y.o.       MRN: [id number]  Referring Physician: AGNES Shaw,*  Cardiologist: Dr. Amos    Provider: Manny  Clinician: Faiza Soriano MS, EP    Dx: CABGx2    Description of Diagnosis: CABGx2 RCA, LAD     Date of onset: 2/15/24      Patient has some sternal pain with certain movements, sleeping is uncomfortable.      ASSESSMENT        Weight    Wt Readings from Last 1 Encounters:   24 53.5 kg (117 lb 15.1 oz)      Height:   Ht Readings from Last 1 Encounters:   24 5' 11\" (1.803 m)     Medical History:   Past Medical History:   Diagnosis Date    DEISI (acute kidney injury) ([facility])     Chronic HFrEF (heart failure with reduced ejection fraction) ([facility])     Coronary artery disease     Hypertension     Prediabetes     S/P CABG x 2 02/15/2024    S/P mitral valve repair 02/15/2024         Physical Limitations: N/A    Fall Risk: Low   Comments: Ambulates with a steady gait with no assist device and Denies a fall in the past 6 months      Risk Factors:  Cholesterol: Yes  Smoking: Never used  HTN: No  DM: No  Obesity: No   Inactivity: Yes, only lifting heavy furniture, 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    Allergies: Shellfish-derived products - food allergy    ETOH:   Social History     Substance and Sexual Activity   Alcohol Use Not Currently       Current Medications:   Current Outpatient Medications   Medication Sig Dispense Refill    acetaminophen (TYLENOL) 325 mg tablet Take 2 tablets (650 mg total) by mouth every 6 (six) hours while awake      " ascorbic acid (VITAMIN C) 500 MG tablet Take 1 tablet (500 mg total) by mouth daily 30 tablet 2    aspirin 325 mg tablet Take 1 tablet (325 mg total) by mouth daily 30 tablet 1    atorvastatin (LIPITOR) 80 mg tablet Take 1 tablet (80 mg total) by mouth daily with dinner 30 tablet 1    lisinopril (ZESTRIL) 5 mg tablet Take 1 tablet (5 mg total) by mouth daily 30 tablet 1    melatonin 3 mg Take 1 tablet (3 mg total) by mouth daily at bedtime      metoprolol tartrate (LOPRESSOR) 25 mg tablet Take 1 tablet (25 mg total) by mouth every 12 (twelve) hours 60 tablet 2    oxyCODONE (ROXICODONE) 5 immediate release tablet Take 2.5 mg every 6 hours as needed for moderate pain or take 5 mg every 8 hours as needed for severe pain 30 tablet 0    pantoprazole (PROTONIX) 40 mg tablet Take 1 tablet (40 mg total) by mouth daily 30 tablet 0     No current facility-administered medications for this visit.         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, driving school buses   Recreation: see above   ADL’s: No limitations  Matheny: No limitations  Exercise: heavy lifting furniture  Other: N/A    Current Functional Status:  Occupation: unemployed, disabled  Recreation: see above   ADL’s:resumed all ADLs within sternal precautions  Matheny: resumed all ADLs within sternal precautions  Exercise: N/a, light walking around the house   Home exercise equipment: No  Other: N/A      Nutritional:  Pt's current dietary habits include:  Patient is very malnourished, working on eating more protein, consuming more protein shakes     Patient Specific Goals:     EXERCISE GOALS (home exercise, ADLs):   Improving stamina   Building more muscle, lost hand strength, weak   NUTRITION GOALS (wt control, diabetes management, dietary modifications):   Massive weight gain   More protein powder, shakes   PSYCHOCOSOCIAL GOALS (stress, emotional well being, social support):  "  Wants to \"get back into the world\"  travel   CORE COMPONENT GOALS (smoking, BP control, angina control, medication):   Decrease medication reliance   Get rid of lifevest        Ability to reach goals/rehabilitation potential:  Very Good     Projected return to function: 12 weeks  Objective tests: sub-max TM ETT      Cultural needs: N/A      REPORT OF CURRENT CARDIAC EVENT: SOB, no chest pain, on antibiotics for treating pneumonia, ended up not having pneumonia, going to doctor, did SHYANNE and echo, EF at 10%, went to hospital for CABGx2     OTHER CARDIAC HISTORY:     COMMENTS:       INDIVIDUALIZED TREATMENT PLAN      SUMMARY OF PROGRESS:  Today is Rohan Don' initial evaluation to begin Cardiac Rehab post CABGx2.  The patient does not currently follow a home exercise program. Patient has not been walking prior to cardiac procedure, patient only was going up and down stairs helping with wife's cleaning business.  He has not resumed all ADLs following sternal restrictions and reporting weakness and fatigue.  Depression screening using the PHQ-9 interprets the patient's score of  4  as  1-4 = Minimal Depression. NATASHA-7 screening tool for anxiety suggests 0  0-4  = Not anxious. When addressed, the patient denies having depression/anxiety. Patient reports excellent social/emotional support from spouse, friends and family.  Information to utilize [employer] was provided as well as contact information for counseling through  [facility].  PHQ-9 score will be reassessed in 30 days due to an initial score revealing concern for depression. They rate stress 3/10 with the following stressors: current health condition, finances.  Patient will attend a group class on stress and relaxation.   The patient is a non-smoker. Patient admits to 100% medication compliance.    The patient completed an initial submaximal TM ETT. The patient completed 9 minutes of stage III (4.3METs) with test termination of RPE 6. Resting  BP " 104/62 with Normal response to exercise reaching 102/62.  Blood Pressure will be monitored throughout the program and cardiologist will be notified of elevated trends.  Patient reported no symptoms with exercise.. Telemetry revealed SR, BBB.  Rohan was counseled on exercise guidelines to achieve a minimum of 150 mins/wk of moderate intensity (RPE 4-6) exercise and encouraged to add 1-2 days of exercise on opposite days of cardiac rehab as tolerated. We discussed current dietary habits and goals of heart healthy eating for lipid management and weight gain. His education will focus on lifestyle modification/education specific to his/her CAD risk factors including:  inactivity, hypertension, and hyperlipidemia.  Group and individualized education will be provided on heart healthy eating, reading food labels, stress management, risk factor reduction, understanding heart disease and common heart medications.  Patient will attend 35 monitored exercise sessions beginning TBD.  See outlined plan of care below for specific patient goals in each component of care.      Patient has financial complications at this time. Currently self-pay for rehab sessions. Will call at a later date if patient gets insurance and wants to schedule sessions.    Medication compliance: Yes   Comments: Pt reports to be compliant with medications    Fall Risk: Low   Comments: Ambulates with a steady gait with no assist device      EXERCISE ASSESSMENT and PLAN    ECG Interpretation: SR, BBB    SMART Goals:   10% improvement in functional capacity based on max METs achieved in initial fitness assessment  reduced dyspnea with physical activity    improved DASI score by 10%  increased exercise capacity by 40% based on peak METs tolerated in cardiac rehab exercise session  maintain > 150 minutes per week of moderate intensity exercise    Patient Specific EXERCISE GOALS:   (home exercise, ADLs, recreation):  resume ADLs with increased strength, attend  rehab regularly, decrease sitting time, return to regular exercise regimen, and resume yard work    Patient's progress toward SMART and personal goals:  Patient has not started exercise at home, completing ADLs within sternal limits     Patient Specific Plan For next 30 days: not to be tempted to lift anything heavy, ask for help, resume driving     Plan:  education on home exercise guidelines, home exercise 30+ mins 2 days opposite CR, and Group class: Risk Factors for Heart Disease    Current Aerobic Exercise Prescription:      Frequency: 3 days/week   Supplement with home exercise 2+ days/wk as tolerated       Minutes: 20 - 40         METS: 2.0-2.5            HR: (50-85% HRR or RHR +30-40bpm):     RPE: 4-6         Modalities: Treadmill, NuStep, and Recumbent bike     Exercise workloads will be progressed gradually as tolerated, within limits of patient's ability, and according to the patient's response to the exercise program.      30 Day Goals for Aerobic Exercise Progression:    Frequency: 3 days/week of cardiac rehab       Supplement with home exercise 2+ days/wk as tolerated    Minutes: 40                            >150 mins/wk of moderate intensity exercise   METS: 2.5-3.0   HR: 20-30 beats above RHR      RPE: 4-6   Modalities: Treadmill, UBE, Lifecycle, NuStep, and Recumbent bike    Strength training:  Will be added following at least 8 weeks post surgery and 8-10 monitored sessions   Modalities: Leg Press, Chest Press, Pull Downs, Lateral Raise, and Arm Extension    Home Exercise: none    Group and Individual Education: benefit of exercise for CAD risk factors, home exercise guidelines, RPE scale, Group class: Risk Factors for Heart Disease, and physical activity/exercise in extreme weather conditions       Readiness to change: Preparation:  (Getting ready to change)       NUTRITION ASSESSMENT AND PLAN    Weight control:    Starting weight: 125   Current weight:       Diabetes: N/A  A1c: 6.1    last  "measured: 2/9/24    Lipid management: Last lipid profile 2/9/24  Chol 108  TRG 96  HDL 23  LDL 66    Nutritional   Reviewed patient's Rate your Plate. Discussed key elements of heart healthy eating. Reviewed patient goals for dietary modifications and their clinical implications.  Reviewed most recent lipid profile.     SMART Goals:   HDL >40, eat whole grain breads, brown rice and whole grain cereals, eat 5 or more servings of fruits and vegetables a day, choose lean beef or rarely eat beef, rarely eat processed meats or eat low fat processed meats, eat chicken and fish that is not fried, rarely eat frozen desserts or choose fruit or fat-free sweets, Do not cook with oil, butter or margarine, Do not eat fried foods, use \"light\" tub margarine on bread, potatoes and vegetables, choose light or fat-free salad dressings or [place], choose healthy snacks such as fruit, pretzels, and low fat crackers, rarely/never eat salty snacks, choose low sugar desserts and sweets, and rarely/never drink more than 1-2 alcoholic drinks per day.    Patient Specific NUTRITION GOALS:  (wt control, diabetes management, dietary modifications): weight gain     Patient's progress toward SMART and personal goals:  pt trying to consume more protein    Patient specific plan for next 30 days:  encouraged to consume protein shakes    Plan: group class: Reading Food Labels, group Class: Heart Healthy Eating, refer to medical nutrition therapy, increase daily intake of low fat dairy, eat red meat once a week or less, choose lean red meat, rarely eat frozen desserts, cook without fats or oils, never/rarely eat fried foods, use \"light\" tub margarine, rarely/never eat salty snacks, choose low sugar desserts and sweets, drink no more than 1-2 alcoholic drinks in a day, and add healthy fats and  lean proteins for healthy weight gain    Measurable goals were based Rate Your Plate Dietary Self-Assessment. These are the areas in which the patient could score " higher on the assessment.  Goals include recommendations for a heart healthy diet based on American Heart Association.    Group and Individual Education: heart healthy eating principles  low sodium diet  maintaining hydration  nutrition for  lipid management  healthy choices while dining out  portion control  group class: Heart Healthy Eating  group class:  Label Reading    Readiness to change: Preparation:  (Getting ready to change)       PSYCHOSOCIAL ASSESSMENT AND PLAN    Emotional:  Depression assessment:  PHQ-9 = 4  1-4 = Minimal Depression            Anxiety measure:  NATASHA-7 = 0  0-4  = Not anxious    Assessment of depression and anxiety    Patient reports they are coping well with good social support and denies depression or anxiety   Patient's rating of Social support: Excellent   Social Support Network: spouse and neighbors    Self-reported stress level:  3  Stress Management: Practice Relaxation Techniques, Exercise, Keep a positive mindset, and Enjoy a hobby      Psychosocial Assessment as it relates to rehabilitation:   Patient denies issues with his/her family or home life that may affect their rehabilitation efforts.     SMART Goals:     Reduce perceived stress to 1-3/10, Physical Fitness in [place] Score < 3, Daily Activity in [place] Score < 3, Social Activities in [place] Score < 3, Pain in [place] Score < 3, Overall Health in [place] Score < 3, Quality of Life in [facility] Score < 3 , feel less tired with more energy, control or stop worrying, take time to relax, and reduced irritability    Patient Specific PSYCHOCOSOCIAL GOALS:  (stress, emotional well being, social support):  spend time with family    Patient's progress toward SMART and personal goals:  pt stressed about finances, trying to time management payments    Patient specific plan for next 30 days:  look for insurance to help with medical bills     Plan: Class: Stress and Your Health, Class: Relaxation, Practice relaxation  techniques, Exercise, Spend time outdoors, and Enjoy family    Group and Individual Education: benefits of a positive support system, stress management techniques, class:  Relaxation, and class:  Stress and Your Health     Readiness to change: Preparation:  (Getting ready to change)       OTHER CORE COMPONENTS     Tobacco:   Social History     Tobacco Use   Smoking Status Never   Smokeless Tobacco Never       Tobacco Use Intervention:   N/A:  Patient is a non-smoker     Anginal Symptoms:  None   NTG use: No prescription    Blood pressure:    Restin/62   Exercise: 102/62    SMART Goals: consistent, controlled resting BP < 130/80 and medication compliance      Patient Specific CORE COMPONENT GOALS:  (smoking, BP control, angina control, medication): reduced dietary sodium <2000mg, medication compliance, and gain weight     Patient's progress toward SMART and personal goals:  reading food labels     Patient specific plan for next 30 days:  gain 5 lbs in one month, get rid of lifevest, improve EF    Plan: group class: Understanding Heart Disease, group class: Common Heart Medications, avoid places with second hand smoke, avoid processed foods, engage in regular exercise, eliminate salt shaker at the table, use salt substitutes, and check labels for sodium content    Group and Individual Education:  understanding high blood pressure and it's relationship to CAD, low sodium diet and heart failure, group class: Understanding Heart Disease, and group class:  Common Heart Medications    Readiness to change: Preparation:  (Getting ready to change)

## 2024-03-19 ENCOUNTER — OFFICE VISIT (OUTPATIENT)
Dept: PULMONOLOGY | Facility: CLINIC | Age: 64
End: 2024-03-19
Payer: COMMERCIAL

## 2024-03-19 VITALS
OXYGEN SATURATION: 97 % | DIASTOLIC BLOOD PRESSURE: 62 MMHG | SYSTOLIC BLOOD PRESSURE: 100 MMHG | TEMPERATURE: 96.8 F | HEART RATE: 86 BPM

## 2024-03-19 DIAGNOSIS — R91.1 NODULE OF UPPER LOBE OF RIGHT LUNG: ICD-10-CM

## 2024-03-19 DIAGNOSIS — I25.10 CORONARY ARTERY DISEASE INVOLVING NATIVE CORONARY ARTERY OF NATIVE HEART WITHOUT ANGINA PECTORIS: Primary | Chronic | ICD-10-CM

## 2024-03-19 DIAGNOSIS — J90 PLEURAL EFFUSION: ICD-10-CM

## 2024-03-19 DIAGNOSIS — E43 SEVERE PROTEIN-CALORIE MALNUTRITION (HCC): ICD-10-CM

## 2024-03-19 DIAGNOSIS — I50.22 CHRONIC HFREF (HEART FAILURE WITH REDUCED EJECTION FRACTION) (HCC): Chronic | ICD-10-CM

## 2024-03-19 PROBLEM — I51.9 SEVERE LEFT VENTRICULAR SYSTOLIC DYSFUNCTION: Status: RESOLVED | Noted: 2024-02-15 | Resolved: 2024-03-19

## 2024-03-19 PROCEDURE — 99214 OFFICE O/P EST MOD 30 MIN: CPT | Performed by: INTERNAL MEDICINE

## 2024-03-19 NOTE — PROGRESS NOTES
Progress note - Pulmonary Medicine   Rohan Don 63 y.o. male MRN: [id number]       Impression & Plan:     Nodule of upper lobe of right lung  The opacity seen on CT scan last month during his admission for CHF and CABG does not appear suspicious, the appearance favors inflammatory or infectious process or possible atelectasis.  Patient has no risk factors.  Ideally this needs chest CT scan follow-up in 3 months from that time to confirm stability or improvement as it was not seen on chest x-ray.  I offered the patient to order CT scan but he is in the time of transitioning his insurance and he wanted to check his co-pay and no the coverage before so he requested not to order it for now.  I explained to him that when he settles his insurance issues to either let me know or he can get an order to repeat CT scan from his PCP or even his cardiac surgeon or cardiologist in the future.  He verbalized understanding.    Pleural effusion  Most likely secondary to CHF, improved on chest x-ray.  No further intervention.    Chronic HFrEF (heart failure with reduced ejection fraction) (HCC)  Wt Readings from Last 3 Encounters:   03/04/24 53.5 kg (117 lb 15.1 oz)   02/27/24 55.7 kg (122 lb 14.4 oz)   02/26/24 56.3 kg (124 lb 3.2 oz)   Ejection fraction after CABG, currently appears compensated and euvolemic.  Continue LifeVest and follow with cardiology and cardiac surgery.            Coronary artery disease  Status post CABG, follow-up with cardiology and cardiac surgery    Severe protein-calorie malnutrition (HCC)  Significant weight loss but currently stabilizing, still has temporal and interosseous muscle wasting and BMI 16.45.  I encouraged him to continue effort to gain more weight and to increase his calorie and protein intake.      Return if symptoms worsen or fail to improve.    Diagnoses and all orders for this visit:    Coronary artery disease involving native coronary artery of native heart without angina  pectoris    Chronic HFrEF (heart failure with reduced ejection fraction) (HCC)    Nodule of upper lobe of right lung    Pleural effusion    Severe protein-calorie malnutrition (HCC)      Patient will follow-up with his new PCP as he is changing and also with cardiology, he will let me know if no one is ordering his chest CT scan so I can see him in the future and repeat the CT scan.    ______________________________________________________________________    HPI:    Rohan Don presents today for follow-up of lung opacity seen on CT scan during recent hospitalization.  Patient was diagnosed with ischemic cardiomyopathy in February of this year, last month, was admitted to the hospital and found triple-vessel disease with LVEF 15%, he underwent CABG and was discharged with a LifeVest to follow-up with cardiology and cardiac surgery.  Interestingly he did not have risk factors for coronary artery disease.  He had another admission with failure to thrive in early March and weight loss and was considered to have viral gastroenteritis.  During his hospitalization for CABG he had chest CT scan that showed lung opacity.  Patient in general feels fine, denies shortness of breath or cough or sputum production, denies chest pain or from his surgical site., denies fever chills or night sweats.  Denies history of pulmonary disease.  He never smoked cigarettes.  He started to gain some weight since last admission.    Current Medications:    Current Outpatient Medications:     acetaminophen (TYLENOL) 325 mg tablet, Take 2 tablets (650 mg total) by mouth every 6 (six) hours while awake, Disp: , Rfl:     aspirin 325 mg tablet, Take 1 tablet (325 mg total) by mouth daily, Disp: 30 tablet, Rfl: 1    atorvastatin (LIPITOR) 80 mg tablet, Take 1 tablet (80 mg total) by mouth daily with dinner, Disp: 30 tablet, Rfl: 1    lisinopril (ZESTRIL) 5 mg tablet, Take 1 tablet (5 mg total) by mouth daily, Disp: 30 tablet, Rfl: 1    metoprolol  tartrate (LOPRESSOR) 25 mg tablet, Take 1 tablet (25 mg total) by mouth every 12 (twelve) hours, Disp: 60 tablet, Rfl: 2    oxyCODONE (ROXICODONE) 5 immediate release tablet, Take 2.5 mg every 6 hours as needed for moderate pain or take 5 mg every 8 hours as needed for severe pain, Disp: 30 tablet, Rfl: 0    pantoprazole (PROTONIX) 40 mg tablet, Take 1 tablet (40 mg total) by mouth daily, Disp: 30 tablet, Rfl: 0    ascorbic acid (VITAMIN C) 500 MG tablet, Take 1 tablet (500 mg total) by mouth daily, Disp: 30 tablet, Rfl: 2    melatonin 3 mg, Take 1 tablet (3 mg total) by mouth daily at bedtime, Disp: , Rfl:     Review of Systems:  Review of Systems   Constitutional: Negative.  Negative for appetite change and fever.   HENT:  Positive for rhinorrhea. Negative for ear pain, postnasal drip, sneezing, sore throat and trouble swallowing.    Eyes: Negative.    Cardiovascular:  Positive for chest pain.   Gastrointestinal: Negative.    Endocrine: Negative.    Genitourinary: Negative.    Musculoskeletal: Negative.  Negative for myalgias.   Skin: Negative.    Allergic/Immunologic: Negative.    Neurological: Negative.  Negative for headaches.   Hematological: Negative.    Psychiatric/Behavioral: Negative.       Aside from what is mentioned in the HPI, the review of systems is otherwise negative    Past medical history, surgical history, and family history were reviewed and updated as appropriate    Social history updates:  Social History     Tobacco Use   Smoking Status Never   Smokeless Tobacco Never       PhysicalExamination:  Vitals:   /62 (BP Location: Left arm, Patient Position: Sitting, Cuff Size: Standard)   Pulse 86   Temp (!) 96.8 °F (36 °C) (Tympanic)   SpO2 97%     Gen:  Comfortable on room air.  No conversational dyspnea  HEENT:  Conjugate gaze.  sclerae anicteric.  Oropharynx moist  Neck: Trachea is midline. No JVD. No adenopathy  Chest: Equal breath sounds and clear to auscultation bilaterally  Cardiac:  1 S2 regular. no murmur  Abdomen:  benign  Extremities: No edema  Neuro:  Normal speech and mentation    Diagnostic Data:  Labs:  I personally reviewed the most recent laboratory data pertinent to today's visit    Lab Results   Component Value Date    WBC 8.48 03/05/2024    HGB 10.1 (L) 03/05/2024    HCT 31.2 (L) 03/05/2024    MCV 90 03/05/2024     (H) 03/05/2024     Lab Results   Component Value Date    SODIUM 135 03/05/2024    K 3.5 03/05/2024    CO2 24 03/05/2024     03/05/2024    BUN 10 03/05/2024    CREATININE 0.67 03/05/2024    GLUCOSE 100 02/15/2024    CALCIUM 8.2 (L) 03/05/2024           Imaging:  I personally reviewed the images on the PAC system pertinent to today's visit  Chest CT scan reviewed on PACS with the patient in the room: Right upper lobe medial irregular opacity appears as infectious versus atelectasis, bilateral pleural effusions.  No mediastinal lymphadenopathy    X-ray reviewed on PACS: Clear lung    Other studies:  Echocardiogram post CABG: LVEF 25%, mildly dilated RV with moderately reduced systolic function, estimated peak PA pressure 35    Kayy Bright MD  Answers submitted by the patient for this visit:  Pulmonology Questionnaire (Submitted on 3/19/2024)  Chief Complaint: Primary symptoms  Do you have shortness of breath that occurs with effort or exertion?: No  Do you have ear congestion?: No  Do you have heartburn?: No  Do you have fatigue?: No  Do you have nasal congestion?: No  Do you have shortness of breath when lying flat?: No  Do you have shortness of breath when you wake up?: No  Do you have sweats?: No  Have you experienced weight loss?: No  Which of the following makes your symptoms worse?: nothing

## 2024-03-19 NOTE — ASSESSMENT & PLAN NOTE
Significant weight loss but currently stabilizing, still has temporal and interosseous muscle wasting and BMI 16.45.  I encouraged him to continue effort to gain more weight and to increase his calorie and protein intake.

## 2024-03-19 NOTE — ASSESSMENT & PLAN NOTE
The opacity seen on CT scan last month during his admission for CHF and CABG does not appear suspicious, the appearance favors inflammatory or infectious process or possible atelectasis.  Patient has no risk factors.  Ideally this needs chest CT scan follow-up in 3 months from that time to confirm stability or improvement as it was not seen on chest x-ray.  I offered the patient to order CT scan but he is in the time of transitioning his insurance and he wanted to check his co-pay and no the coverage before so he requested not to order it for now.  I explained to him that when he settles his insurance issues to either let me know or he can get an order to repeat CT scan from his PCP or even his cardiac surgeon or cardiologist in the future.  He verbalized understanding.

## 2024-03-19 NOTE — ASSESSMENT & PLAN NOTE
Wt Readings from Last 3 Encounters:   03/04/24 53.5 kg (117 lb 15.1 oz)   02/27/24 55.7 kg (122 lb 14.4 oz)   02/26/24 56.3 kg (124 lb 3.2 oz)   Ejection fraction after CABG, currently appears compensated and euvolemic.  Continue LifeVest and follow with cardiology and cardiac surgery.

## 2024-03-22 ENCOUNTER — OFFICE VISIT (OUTPATIENT)
Dept: CARDIAC SURGERY | Facility: CLINIC | Age: 64
End: 2024-03-22

## 2024-03-22 VITALS
WEIGHT: 127.1 LBS | OXYGEN SATURATION: 98 % | DIASTOLIC BLOOD PRESSURE: 70 MMHG | HEIGHT: 71 IN | HEART RATE: 91 BPM | BODY MASS INDEX: 17.79 KG/M2 | TEMPERATURE: 97.9 F | SYSTOLIC BLOOD PRESSURE: 92 MMHG

## 2024-03-22 DIAGNOSIS — Z48.89 ENCOUNTER FOR POSTOPERATIVE CARE: ICD-10-CM

## 2024-03-22 DIAGNOSIS — Z98.890 S/P MVR (MITRAL VALVE REPAIR): Chronic | ICD-10-CM

## 2024-03-22 DIAGNOSIS — Z95.1 S/P CABG X 2: Primary | Chronic | ICD-10-CM

## 2024-03-22 PROCEDURE — 99024 POSTOP FOLLOW-UP VISIT: CPT | Performed by: THORACIC SURGERY (CARDIOTHORACIC VASCULAR SURGERY)

## 2024-03-22 RX ORDER — MULTIVIT-MIN/IRON/FOLIC ACID/K 18-600-40
500 CAPSULE ORAL DAILY
COMMUNITY

## 2024-03-22 RX ORDER — LANOLIN ALCOHOL/MO/W.PET/CERES
3 CREAM (GRAM) TOPICAL AS NEEDED
COMMUNITY

## 2024-03-22 NOTE — PROGRESS NOTES
" POST OP FOLLOW UP VISIT    Procedure:  2/15/24  1.  Intra-aortic balloon pump placement  2. Coronary artery bypass grafting x2 with LIMA to LAD and SVG to RCA; 3. Mitral valve repair with 32 mm Herman Physio II ring annuloplasty.     History: Rohan Don is a 63 y.o. year old male who presents to our office today for routine follow up care from coronary artery bypass grafting and mitral valve repair. IABP was placed at the start of surgery.  Patient tolerated procedure well. Pos top course was stable and he was successful weaned form IABP support, removed on POD # 3. Post op Echo demonstrated EF 20% , therefore, he was fit for a lifevest. He was discharged to home on POD # 6. Patient was recovering well until early Mancini when he was developed GI disturbances and continued to loose weight. He was admitted tot  hospital for 3 days and felt to have enteritis.   Since discharged home he is doing much better. Today he reports good appetite, eating well and gaining weight. He denies CP, SOB, lightheadedness,palpitations or shocks from the lifevest.  Incisions healing well, no fever or chills.  His insurance changed as of March 1 and [facility] providers are not covered. He is looking to transition to different providers.    Vital Signs:   Vitals:    03/22/24 1418 03/22/24 1424   BP: 103/65 92/70   BP Location: Left arm Right arm   Patient Position: Sitting Sitting   Cuff Size: Standard Standard   Pulse: 91    Temp: 97.9 °F (36.6 °C)    TempSrc: Tympanic    SpO2: 98%    Weight: 57.7 kg (127 lb 1.6 oz)    Height: 5' 11\" (1.803 m)        Home Medications:   Prior to Admission medications    Medication Sig Start Date End Date Taking? Authorizing Provider   acetaminophen (TYLENOL) 325 mg tablet Take 2 tablets (650 mg total) by mouth every 6 (six) hours while awake 2/21/24  Yes Salima Maddox PA-C   Ascorbic Acid (Vitamin C) 500 MG CAPS Take 500 mg by mouth in the morning   Yes Historical Provider, MD   aspirin 325 mg " tablet Take 1 tablet (325 mg total) by mouth daily 2/22/24  Yes Salima Maddox PA-C   atorvastatin (LIPITOR) 80 mg tablet Take 1 tablet (80 mg total) by mouth daily with dinner 2/21/24  Yes Salima Maddox PA-C   lisinopril (ZESTRIL) 5 mg tablet Take 1 tablet (5 mg total) by mouth daily 2/22/24  Yes Salima Maddox PA-C   melatonin 3 mg Take 3 mg by mouth if needed   Yes Historical Provider, MD   metoprolol tartrate (LOPRESSOR) 25 mg tablet Take 1 tablet (25 mg total) by mouth every 12 (twelve) hours 2/21/24  Yes Salima Maddox PA-C   oxyCODONE (ROXICODONE) 5 immediate release tablet Take 2.5 mg every 6 hours as needed for moderate pain or take 5 mg every 8 hours as needed for severe pain 2/21/24  Yes Salima Maddox PA-C   pantoprazole (PROTONIX) 40 mg tablet Take 1 tablet (40 mg total) by mouth daily 2/22/24 3/23/24 Yes Salima Maddox PA-C   ascorbic acid (VITAMIN C) 500 MG tablet Take 1 tablet (500 mg total) by mouth daily 2/26/24 3/22/24  OSCAR Garcia   melatonin 3 mg Take 1 tablet (3 mg total) by mouth daily at bedtime 2/21/24 3/22/24  Salima Maddox PA-C       Physical Exam:  General: Alert, oriented, underweight, good color, no acute distress  HEENT/NECK:  PERRLA.  No jugular venous distention.    Cardiac:Regular rate and rhythm, no murmurs rubs or gallops.  Pulmonary:Breath sounds clear bilaterally  Abdomen:  Non-tender, Non-distended.  Positive bowel sounds.  Upper extremities: 2+ radial pulses; brisk capillary refill  Lower extremities: Extremities warm/dry. PT/DP pules 2+ bilaterally.  No edema B/L  Incisions: Sternum is stable.  Incision is clean, dry, and intact.   Saphenectomy incision is clean, dry, and intact  Musculoskeletal: MAEE, stable gait  Neuro: Alert and oriented X 3.  Sensation is grossly intact.  No focal deficits  Skin: Warm/Dry, without rashes or lesions.    Lab Results:   Lab Results   Component Value Date    WBC 8.48 03/05/2024    HGB 10.1 (L)  03/05/2024    HCT 31.2 (L) 03/05/2024    MCV 90 03/05/2024     (H) 03/05/2024     Lab Results   Component Value Date    SODIUM 135 03/05/2024    K 3.5 03/05/2024     03/05/2024    CO2 24 03/05/2024    BUN 10 03/05/2024    CREATININE 0.67 03/05/2024    GLUC 104 03/05/2024    CALCIUM 8.2 (L) 03/05/2024     Lab Results   Component Value Date    HGBA1C 6.1 (H) 02/09/2024         Imaging Studies:     CXR 3/5/24  FINDINGS: Sternotomy. Prosthetic heart valve.     Clear lungs. No pneumothorax or pleural effusion.     Normal cardiomediastinal silhouette.     Bones are unremarkable for age.     Normal upper abdomen.     IMPRESSION:     No acute cardiopulmonary disease.         I have personally reviewed pertinent films in PACS    Assessment:   Coronary artery disease, Mitral regurgitation, cardiomyopathy.   S/P coronary artery bypass grafting and mitral valve repair    Rohan Don is 5 weeks post-op, making good progress in their recovery.  Incisions are well-healed and the sternum is stable.   Weight and VS are stable.     Plan:   Medications reviewed with patient, associated questions answered and no changes made.     Benefits of participating in cardiac rehab have been discussed and patient is cleared to begin the outpatient  program.     May resume driving.    Increase activity as tolerated and maintain alifting restriction of no more than 25 lbs until 5/15/24, which is 12 weeks from the surgical date.     No further follow up in our office is needed; call with questions or concerns.     Patient should maintain routine follow-up with Cardiology and Primary Care for ongoing medical care.     Will need follow up echo in May to assess LV function and if remains low, will need ICD.    Patient verbalizes understanding of recommendations and all questions were answered to their satisfaction.    Patient has an appt with GI next month    OSCAR Garcia  3/22/24  2:30PM

## 2024-03-29 ENCOUNTER — PATIENT OUTREACH (OUTPATIENT)
Dept: CARDIOLOGY CLINIC | Facility: CLINIC | Age: 64
End: 2024-03-29

## 2024-03-29 NOTE — PROGRESS NOTES
Patient Outreach Due.    OP Advanced Heart Failure LCSW placed call to  Financial Counselor as pt has balance over $130,000. Spoke with Tanna who stated that KEVIN VASQUEZ was denied per PATHS for February 2024 hospitalization due to over income.   Tanna stated that pt has an insurance effective March 1st, 2024 but is self-pay. He applied for  [place] Application and additional documents are required.     LCSW placed call to patient.  Pt reported he was over income for KEVIN VASQUEZ by $15. He purchased a marketplace insurance called [employer] which was effective on March 1st 2024. This insurance is OON with  so pt is transferring his care to [facility].   Pt stated that he's going to  Financial Services today to drop off the remainder of documents required for [place] application.  LCSW encouraged pt to have his [employer] Ins card scanned into his electronic record.    Pt did not have any other social work concerns at this time. Discussed that referral will be closed however pt is welcome to call this LCSW if he would like to ask a question in the future.

## 2024-04-02 ENCOUNTER — TELEPHONE (OUTPATIENT)
Dept: CARDIAC SURGERY | Facility: CLINIC | Age: 64
End: 2024-04-02

## 2024-04-04 ENCOUNTER — TELEPHONE (OUTPATIENT)
Dept: CARDIOLOGY CLINIC | Facility: CLINIC | Age: 64
End: 2024-04-04

## 2024-04-04 NOTE — TELEPHONE ENCOUNTER
Pt called about status of life vest that was ordered in chart. Please advise. States he wants to ensure that a letter of medical necessity is sent to his insurance, [employer].       Dr. Shaw told him that this was ordered by cardiology.    Pt would like a message through [employer] or [phone]

## 2024-04-05 ENCOUNTER — TELEPHONE (OUTPATIENT)
Dept: CARDIOLOGY CLINIC | Facility: CLINIC | Age: 64
End: 2024-04-05

## 2024-04-05 NOTE — TELEPHONE ENCOUNTER
Spoke with pt . He needs letter of medical necessary for the life vest. He needs it sent to his insurance company and a copy given to him.    I do not see him on the [employer] web site.  Will send e-mail to get him on the list.    I will call the pt back when I have all the info he is questioning.

## 2024-04-10 ENCOUNTER — TELEPHONE (OUTPATIENT)
Dept: CARDIOLOGY CLINIC | Facility: CLINIC | Age: 64
End: 2024-04-10

## 2024-04-10 NOTE — TELEPHONE ENCOUNTER
Faxed completed Outpatient Authorization Form for patient to receive coverage for Lifevest, to [facility]   FAX; [phone] &   FAX: [phone].  Both were accompanied with a medical necessity letter from Alyssa Amezcua PA-C, along with 2/20/24 Echo results, & office visit progress notes.

## 2024-04-11 ENCOUNTER — TELEPHONE (OUTPATIENT)
Dept: CARDIOLOGY CLINIC | Facility: CLINIC | Age: 64
End: 2024-04-11

## 2024-04-11 NOTE — TELEPHONE ENCOUNTER
After a prolonged conversation with many people, having been transferred often, received instructions how to dispute their email stating they were unable to process Outpatient Authorization Form. Mailed a new completed Authorization form & a reconsideration claim dispute form & mailed it to:  Kala from [facility]  Attn: Level 1 - Request for Reconsideration  [address]  [place] [address].

## 2024-04-12 NOTE — TELEPHONE ENCOUNTER
As per patient's request, mailed him a copy of letter of necessity for Lifevest from Alyssa Amezcua, Outpatient Authorization Form, & Provider Request For Reconsideration Claim Dispute form.

## 2024-11-11 NOTE — CONSULTS
Consult received regarding Severe CAD. Spoke with patient and has not received po supplement. Agreed per discussion for RD to add 1 Van Glucerna at breakfast, Berry Magic Cup at lunch and van Ensure Plus High Protein at dinner as compromise to not increase BG levels but still provide adequate kcal/pro intake. Patient reported he is trying to increase menu selection amounts and variety to help increase kcal/pro intake as well. Denied any additional diet education questions at this time. Encouraged po intake as able. Patient reported he has never been heavier than 150#s and has always been thin his whole life but not this thin recently.    Lab Results   Component Value Date    EGFR 41 11/08/2024    EGFR 47 11/02/2024    EGFR 46 11/01/2024    CREATININE 1.45 (H) 11/08/2024    CREATININE 1.29 11/02/2024    CREATININE 1.31 (H) 11/01/2024     No FRANCO in hospital but did have slight increase in Cr. Increased slightly on recheck a few days ago. Will increase fluids to 2L daily from 1800 mL and continue to monitor UOP. As long as no complications can further increase fluids.

## 2024-12-15 ENCOUNTER — HOSPITAL ENCOUNTER (EMERGENCY)
Facility: HOSPITAL | Age: 64
Discharge: HOME | End: 2024-12-15
Attending: EMERGENCY MEDICINE
Payer: COMMERCIAL

## 2024-12-15 VITALS
OXYGEN SATURATION: 97 % | RESPIRATION RATE: 16 BRPM | HEART RATE: 65 BPM | SYSTOLIC BLOOD PRESSURE: 139 MMHG | DIASTOLIC BLOOD PRESSURE: 92 MMHG

## 2024-12-15 DIAGNOSIS — H04.123 DRY EYES: Primary | ICD-10-CM

## 2024-12-15 PROCEDURE — 99284 EMERGENCY DEPT VISIT MOD MDM: CPT | Performed by: EMERGENCY MEDICINE

## 2024-12-15 PROCEDURE — 99283 EMERGENCY DEPT VISIT LOW MDM: CPT

## 2024-12-15 RX ORDER — CARBOXYMETHYLCELLULOSE SODIUM 5 MG/ML
1 SOLUTION/ DROPS OPHTHALMIC 3 TIMES DAILY PRN
Qty: 30 EACH | Refills: 0 | Status: SHIPPED | OUTPATIENT
Start: 2024-12-15

## 2024-12-15 RX ADMIN — FLUORESCEIN SODIUM 1 STRIP: 1 STRIP OPHTHALMIC at 11:16

## 2024-12-15 NOTE — ED PROVIDER NOTES
Time reflects when diagnosis was documented in both MDM as applicable and the Disposition within this note       Time User Action Codes Description Comment    12/15/2024 11:33 AM Evin Jones Add [H04.123] Dry eyes           ED Disposition       ED Disposition   Discharge    Condition   Stable    Date/Time   Sun Dec 15, 2024 11:33 AM    Comment   Rohan Don discharge to home/self care.                   Assessment & Plan       Medical Decision Making  64-year-old male with a history of hypertension, CHF, and CAD who presents for evaluation of excessive tearing and left eye redness.  Vitals are within the normal limits.  On exam the patient has mild erythema of the medial canthus of the left eye.  Sclera are grossly unremarkable bilaterally, PERRL, EOMI. fluorescein exam is done with no evidence of corneal abrasion.  Based on the patient's symptoms and exam suspect dry eyes.  Will prescribe lubricating drops and have the patient follow-up with mom.  Precautions given and the patient is discharged.    Risk  OTC drugs.  Prescription drug management.             Medications   fluorescein sodium sterile ophthalmic strip 1 strip (1 strip Left Eye Given 12/15/24 1116)       ED Risk Strat Scores                          SBIRT 20yo+      Flowsheet Row Most Recent Value   Initial Alcohol Screen: US AUDIT-C     1. How often do you have a drink containing alcohol? 0 Filed at: 12/15/2024 1117   2. How many drinks containing alcohol do you have on a typical day you are drinking?  0 Filed at: 12/15/2024 1117   3a. Male UNDER 65: How often do you have five or more drinks on one occasion? 0 Filed at: 12/15/2024 1117   Audit-C Score 0 Filed at: 12/15/2024 1117   JOSE ANGEL: How many times in the past year have you...    Used an illegal drug or used a prescription medication for non-medical reasons? Never Filed at: 12/15/2024 1117                            History of Present Illness       Chief Complaint   Patient presents with     Eye Redness     Pt reports recurring clear eye drainage to left eye. Pt denies pain in eye. Pt reports with redness to inner corner of left eye. Pt reports ongoing drainage for 1 month.        Past Medical History:   Diagnosis Date    DEISI (acute kidney injury) ([facility]) 2/9/2024    Chronic HFrEF (heart failure with reduced ejection fraction) ([facility]) 2024    Coronary artery disease     Hypertension     Prediabetes     S/P CABG x 2 02/15/2024    S/P mitral valve repair 02/15/2024      Past Surgical History:   Procedure Laterality Date    CARDIAC CATHETERIZATION N/A 02/12/2024    Procedure: Cardiac catheterization;  Surgeon: Shanon Pal DO;  [facility]: [facility];  Service: Cardiology    CARDIAC CATHETERIZATION Left 02/12/2024    Procedure: Cardiac Left Heart Cath;  Surgeon: Shanon Pal DO;  [facility];  Service: Cardiology    CARDIAC CATHETERIZATION N/A 02/12/2024    Procedure: Cardiac Coronary Angiogram;  Surgeon: Shanon Pal DO;  [facility]: [facility];  Service: Cardiology    CORONARY ARTERY BYPASS GRAFT  02/15/2024    NC CORONARY ARTERY BYP W/VEIN & ARTERY GRAFT 2 VEIN N/A 02/15/2024    Procedure: CORONARY ARTERY BYPASS GRAFT (CABG) X 2 VESSELS, SVG --> RCA, LIMA to LAD. MITRAL REPAIR WITH  32mm PHYSIO II MITRAL RING;  Surgeon: AGNES Shaw MD;  Location: [facility];  Service: Cardiac Surgery    NC INSERTION INTRA-AORTIC BALLOON ASSIST DEV PERQ Right 02/15/2024    Procedure: INSERTION INTRA BALLOON PUMP AORTIC (IABP);  Surgeon: AGNES Shaw MD;  Location: [facility];  Service: Cardiac Surgery      Family History   Problem Relation Age of Onset    Hypertension Father       Social History     Tobacco Use    Smoking status: Never    Smokeless tobacco: Never   Substance Use Topics    Alcohol use: Not Currently    Drug use: Never      E-Cigarette/Vaping      E-Cigarette/Vaping Substances      I have reviewed and agree with the history as documented.      64-year-old male with a history of hypertension, CHF, and CAD who presents for evaluation of excessive tearing and left eye redness.  The patient reports that his symptoms have been ongoing over the past year but have worsened over the past month.  The patient reports that he is leaving for a trip soon and wanted to get the issue checked out.  The patient denies any pain or itching of his eyes.  The patient denies fever, chills, visual disturbances, pain with eye movements, nasal congestion, cough.        Review of Systems   Constitutional:  Negative for chills and fever.   HENT:  Negative for congestion and sore throat.    Eyes:  Positive for redness. Negative for photophobia, pain, discharge, itching and visual disturbance.   Respiratory:  Negative for cough and shortness of breath.    Cardiovascular:  Negative for chest pain and palpitations.   Gastrointestinal:  Negative for abdominal pain, diarrhea, nausea and vomiting.   Genitourinary:  Negative for dysuria and hematuria.   Musculoskeletal:  Negative for arthralgias and myalgias.   Skin:  Negative for color change, pallor and rash.   Neurological:  Negative for syncope, weakness, light-headedness, numbness and headaches.   All other systems reviewed and are negative.          Objective       ED Triage Vitals [12/15/24 1020]   Temp Pulse Blood Pressure Respirations SpO2 Patient Position - Orthostatic VS   -- 65 139/92 16 97 % Sitting      Temp src Heart Rate Source BP Location FiO2 (%) Pain Score    -- Monitor Left arm -- No Pain      Vitals      Date and Time Temp Pulse SpO2 Resp BP Pain Score FACES Pain Rating User   12/15/24 1020 -- 65 97 % 16 139/92 No Pain -- KH            Physical Exam  Eyes:      General: Lids are normal. Vision grossly intact. No visual field deficit or scleral icterus.        Right eye: No foreign body, discharge or hordeolum.         Left eye: No foreign body, discharge or hordeolum.      Extraocular Movements: Extraocular  movements intact.      Conjunctiva/sclera: Conjunctivae normal.         Results Reviewed       None            No orders to display       Procedures    ED Medication and Procedure Management   Prior to Admission Medications   Prescriptions Last Dose Informant Patient Reported? Taking?   Ascorbic Acid (Vitamin C) 500 MG CAPS  Self Yes No   Sig: Take 500 mg by mouth in the morning   acetaminophen (TYLENOL) 325 mg tablet  Self No No   Sig: Take 2 tablets (650 mg total) by mouth every 6 (six) hours while awake   aspirin 325 mg tablet  Self No No   Sig: Take 1 tablet (325 mg total) by mouth daily   atorvastatin (LIPITOR) 80 mg tablet  Self No No   Sig: Take 1 tablet (80 mg total) by mouth daily with dinner   lisinopril (ZESTRIL) 5 mg tablet  Self No No   Sig: Take 1 tablet (5 mg total) by mouth daily   melatonin 3 mg  Self Yes No   Sig: Take 3 mg by mouth if needed   metoprolol tartrate (LOPRESSOR) 25 mg tablet  Self No No   Sig: Take 1 tablet (25 mg total) by mouth every 12 (twelve) hours      Facility-Administered Medications: None     Discharge Medication List as of 12/15/2024 11:42 AM        START taking these medications    Details   carboxymethylcellulose 0.5 % SOLN Administer 1 drop to both eyes 3 (three) times a day as needed for dry eyes, Starting Sun 12/15/2024, Print           CONTINUE these medications which have NOT CHANGED    Details   acetaminophen (TYLENOL) 325 mg tablet Take 2 tablets (650 mg total) by mouth every 6 (six) hours while awake, Starting Wed 2/21/2024, OTC      Ascorbic Acid (Vitamin C) 500 MG CAPS Take 500 mg by mouth in the morning, Historical Med      aspirin 325 mg tablet Take 1 tablet (325 mg total) by mouth daily, Starting u 2/22/2024, Normal      atorvastatin (LIPITOR) 80 mg tablet Take 1 tablet (80 mg total) by mouth daily with dinner, Starting Wed 2/21/2024, Normal      lisinopril (ZESTRIL) 5 mg tablet Take 1 tablet (5 mg total) by mouth daily, Starting Thu 2/22/2024, Normal       melatonin 3 mg Take 3 mg by mouth if needed, Historical Med      metoprolol tartrate (LOPRESSOR) 25 mg tablet Take 1 tablet (25 mg total) by mouth every 12 (twelve) hours, Starting Wed 2/21/2024, Normal           No discharge procedures on file.  ED SEPSIS DOCUMENTATION   Time reflects when diagnosis was documented in both MDM as applicable and the Disposition within this note       Time User Action Codes Description Comment    12/15/2024 11:33 AM Evin Jones Add [H04.123] Dry eyes                  Evin Jones DO  12/17/24 1535

## 2024-12-15 NOTE — ED ATTENDING ATTESTATION
Final Diagnoses:     1. Dry eyes           I, Rodger Castro MD, saw and evaluated the patient. All available labs and X-rays were ordered by me or the resident / non-physician and have been reviewed by myself. I discussed the patient with the resident / non-physician and agree with the resident's / non-physician practitioner's findings and plan as documented in the resident's / non-physician practicitioner's note, except where noted.   At this point, I agree with the current assessment done in the ED.   I was present during key portions of all procedures performed unless otherwise stated.     HPI:  NURSING TRIAGE:    This is a 64 y.o. male presenting for evaluation of eye redness.  Over past year has had increased  tear production of LEFT eye  Redness of medial aspect of the LEFT eye that's worse in the past month.  No pain.  No trauma  Burning sometimes.  No f/ch/n/v. Chief Complaint   Patient presents with    Eye Redness     Pt reports recurring clear eye drainage to left eye. Pt denies pain in eye. Pt reports with redness to inner corner of left eye. Pt reports ongoing drainage for 1 month.       PHYSICAL: ASSESSMENT + PLAN:   Pertinent: Redness of canthus with some swelling  Conjunctivae are normal.   No obvious lesions noted.  Fluorescein exam pending.  EOMI without pain  No photophobia  No lymph nodes.   No juandice.     General: VS reviewed  Appears in NAD  awake, alert.   Well-nourished, well-developed. Appears stated age.   Speaking normally in full sentences.   Head: Normocephalic, atraumatic  Eyes: EOM-I. No diplopia.   No hyphema.   No subconjunctival hemorrhages.  Symmetrical lids.   ENT: Atraumatic external nose and ears.    MMM  No malocclusion. No stridor. Normal phonation. No drooling. Normal swallowing.   Neck: No JVD.  CV: No pallor noted  Lungs:   No tachypnea  No respiratory distress  Abd: soft nt nd no rebound/guarding  MSK:   FROM spontaneously  Skin: Dry, intact.   Neuro: Awake, alert,  GCS15, CN II-XII grossly intact.   Motor grossly intact.  Psychiatric/Behavioral: interacting normally; appropriate mood/affect.    Exam: deferred    Vitals:    12/15/24 1020   BP: 139/92   BP Location: Left arm   Pulse: 65   Resp: 16   SpO2: 97%    Discussed possibility of significant dry eyes causing syndrome.  Will do lubricating eyedrops.  Full exam with fluorescein for corneal abrasion.  Reassuring exam overall though fascially with the duration of 1 year of symptoms.  The reason they are here is because you are traveling in 10 days and want to make sure that while they are out of country nothing will happen     There are no obvious limitations to social determinants of care.   Nursing note reviewed.   Vitals reviewed.   Orders placed by myself and/or advanced practitioner / resident.    Previous chart was reviewed  History obtained from: Family and Patient  Language barrier: None  Limitations to the history obtained: None    Past Medical: Past Surgical:    has a past medical history of DEISI (acute kidney injury) ([facility]) (2/9/2024), Chronic HFrEF (heart failure with reduced ejection fraction) ([facility]) (2024), Coronary artery disease, Hypertension, Prediabetes, S/P CABG x 2 (02/15/2024), and S/P mitral valve repair (02/15/2024).  has a past surgical history that includes Cardiac catheterization (N/A, 02/12/2024); Cardiac catheterization (Left, 02/12/2024); Cardiac catheterization (N/A, 02/12/2024); pr coronary artery byp w/vein & artery graft 2 vein (N/A, 02/15/2024); pr insertion intra-aortic balloon assist dev perq (Right, 02/15/2024); and Coronary artery bypass graft (02/15/2024).   Social: Cardiac (Echo/Cath)   Social History     Substance and Sexual Activity   Alcohol Use Not Currently     Social History     Tobacco Use   Smoking Status Never   Smokeless Tobacco Never     Social History     Substance and Sexual Activity   Drug Use Never    No results found for this or any previous visit.    No results found for  this or any previous visit.    No results found for this or any previous visit.     Labs: Imaging:   Labs Reviewed - No data to display No orders to display      Medications: Code Status:   Medications   fluorescein sodium sterile ophthalmic strip 1 strip (1 strip Left Eye Given 12/15/24 1116)    Code Status: Prior  Advance Directive and Living Will:      Power of :    POLST:     No orders of the defined types were placed in this encounter.    Time reflects when diagnosis was documented in both MDM as applicable and the Disposition within this note       Time User Action Codes Description Comment    12/15/2024 11:33 AM Evin Jones Add [H04.123] Dry eyes           ED Disposition       ED Disposition   Discharge    Condition   Stable    Date/Time   Sun Dec 15, 2024 11:33 AM    Comment   Rohan Don discharge to home/self care.                   Follow-up Information       Follow up With Specialties Details Why Contact Info Additional Information    [facility] Emergency Medicine Go to  If symptoms worsen [address] [place] [id number]  [phone] [facility], [address] [place] [address], [phone]   [phone]    Chapito Menchaca MD Ophthalmology Schedule an appointment as soon as possible for a visit   [facility] [place] [address]  [phone]             Patient's Medications   Discharge Prescriptions    CARBOXYMETHYLCELLULOSE 0.5 % SOLN    Administer 1 drop to both eyes 3 (three) times a day as needed for dry eyes       Start Date: 12/15/2024End Date: --       Order Dose: 1 drop       Quantity: 30 each    Refills: 0     No discharge procedures on file.  Prior to Admission Medications   Prescriptions Last Dose Informant Patient Reported? Taking?   Ascorbic Acid (Vitamin C) 500 MG CAPS  Self Yes No   Sig: Take 500 mg by mouth in the morning   acetaminophen  "(TYLENOL) 325 mg tablet  Self No No   Sig: Take 2 tablets (650 mg total) by mouth every 6 (six) hours while awake   aspirin 325 mg tablet  Self No No   Sig: Take 1 tablet (325 mg total) by mouth daily   atorvastatin (LIPITOR) 80 mg tablet  Self No No   Sig: Take 1 tablet (80 mg total) by mouth daily with dinner   lisinopril (ZESTRIL) 5 mg tablet  Self No No   Sig: Take 1 tablet (5 mg total) by mouth daily   melatonin 3 mg  Self Yes No   Sig: Take 3 mg by mouth if needed   metoprolol tartrate (LOPRESSOR) 25 mg tablet  Self No No   Sig: Take 1 tablet (25 mg total) by mouth every 12 (twelve) hours      Facility-Administered Medications: None                        Portions of the record may have been created with voice recognition software. Occasional wrong word or \"sound a like\" substitutions may have occurred due to the inherent limitations of voice recognition software. Read the chart carefully and recognize, using context, where substitutions have occurred.    Electronically signed by:  Rodger Castro  "

## 2024-12-15 NOTE — DISCHARGE INSTRUCTIONS
You were seen in the emergency department for excessive tearing and redness of your left eye.  Your symptoms and exam are consistent with dry eyes.  You are given a prescription for eyedrops.  Call the attached number to follow-up with ophthalmology.  If you have any new concerning symptoms please return to the emergency department.

## (undated) DEVICE — SUT MONOCRYL PLUS 3-0 PS-2 27 IN MCP427H

## (undated) DEVICE — INTENDED FOR TISSUE SEPARATION, AND OTHER PROCEDURES THAT REQUIRE A SHARP SURGICAL BLADE TO PUNCTURE OR CUT.: Brand: BARD-PARKER SAFETY BLADES SIZE 15, STERILE

## (undated) DEVICE — PACK CUSTOM PERFUSION ANH

## (undated) DEVICE — SUT PDS PLUS 1 CTB 36 IN PDPB359T

## (undated) DEVICE — TRAY FOLEY 16FR SURESTEP TEMP SENS URIMETER STAT LOK

## (undated) DEVICE — SUT PROLENE 7-0 BV-1/BV-1 24 IN 8304H

## (undated) DEVICE — SILVER-COATED ANTIBACTERIAL BARRIER DRESSING: Brand: ACTICOAT SURGIC 10X12CM 5PK US

## (undated) DEVICE — CATH DIAG 5FR IMPULSE 100CM FR4

## (undated) DEVICE — SUT SILK 2 60 IN SA8H

## (undated) DEVICE — SUT PROLENE 4-0 BB 36 IN 8581H

## (undated) DEVICE — BLANKET HYPOTHERMIA ADULT GAYMAR

## (undated) DEVICE — PLUMEPEN PRO 10FT

## (undated) DEVICE — 3000CC GUARDIAN II: Brand: GUARDIAN

## (undated) DEVICE — INTENDED FOR TISSUE SEPARATION, AND OTHER PROCEDURES THAT REQUIRE A SHARP SURGICAL BLADE TO PUNCTURE OR CUT.: Brand: BARD-PARKER ® CARBON RIB-BACK BLADES

## (undated) DEVICE — 3M™ TEGADERM™ CHG DRESSING 25/CARTON 4 CARTONS/CASE 1659: Brand: TEGADERM™

## (undated) DEVICE — 32 FR STRAIGHT – SOFT PVC CATHETER: Brand: PVC THORACIC CATHETERS

## (undated) DEVICE — SUT MONOCRYL 4-0 PS-2 18 IN Y496G

## (undated) DEVICE — PACK CUSTOM PERFUSION PLEG PK

## (undated) DEVICE — SYRINGE 50ML LL

## (undated) DEVICE — SUT SILK 2-0 SH CR/8 18 IN C012D

## (undated) DEVICE — LIGHT HANDLE COVER SLEEVE DISP BLUE STELLAR

## (undated) DEVICE — GLIDESHEATH BASIC HYDROPHILIC COATED INTRODUCER SHEATH: Brand: GLIDESHEATH

## (undated) DEVICE — ANTIBACTERIAL UNDYED BRAIDED (POLYGLACTIN 910), SYNTHETIC ABSORBABLE SUTURE: Brand: COATED VICRYL

## (undated) DEVICE — Device: Brand: ASAHI SILVERWAY

## (undated) DEVICE — AIRLIFE™ TRI-FLO™ SUCTION CATHETER: Brand: AIRLIFE™

## (undated) DEVICE — SUT SILK 0 CT-1 30 IN 424H

## (undated) DEVICE — BALLOON IABP 8FR 50ML

## (undated) DEVICE — BONE WAX WHITE: Brand: BONE WAX WHITE

## (undated) DEVICE — 40601 PROLONGED POSITIONING SYSTEM: Brand: 40601 PROLONGED POSITIONING SYSTEM

## (undated) DEVICE — ALCON OPHTHALMIC KNIFE 15 °: Brand: ALCON

## (undated) DEVICE — PENCIL ELECTROSURG E-Z CLEAN -0035H

## (undated) DEVICE — RECIP.STERNUM SAW BLADE 34/7.5/0.7MM: Brand: AESCULAP

## (undated) DEVICE — HEMOSTATIC MATRIX SURGIFLO 8ML W/THROMBIN

## (undated) DEVICE — GUIDEWIRE AMPLATZ .035 260CM 6CM ST SS

## (undated) DEVICE — Device: Brand: PROWATER

## (undated) DEVICE — ELECTRODE BLADE E-Z CLEAN 4IN -0014A

## (undated) DEVICE — SILVER-COATED ANTIBACTERIAL BARRIER DRESSING: Brand: ACTICOAT SURGIC 10X25CM 5PK US

## (undated) DEVICE — SUT VICRYL PLUS 1 CTB-1 36 IN VCPB947H

## (undated) DEVICE — SURGICEL NU-KNIT 3 X 4

## (undated) DEVICE — PACK CABG PBDS

## (undated) DEVICE — TUBING INSUFFLATION SET ISO CONNECTOR

## (undated) DEVICE — SUT PROLENE 7-0 BV175-8/BV175-8 24 IN EPM8747

## (undated) DEVICE — AORTIC PUNCH 5.2 MM DISP

## (undated) DEVICE — THERMOFLECT BLANKET, L, 25EA                               TS THERMOFLECT BLANKET, 48" X 84", SILVER, 5/BG, 5 BG/CS NW: Brand: THERMOFLECT

## (undated) DEVICE — SUT ETHIBOND 2-0 SH/SH 36 IN X523H

## (undated) DEVICE — PUMP TUBING FUSION PACK

## (undated) DEVICE — GLOVE INDICATOR PI UNDERGLOVE SZ 8 BLUE

## (undated) DEVICE — GAUZE SPONGES,16 PLY: Brand: CURITY

## (undated) DEVICE — STERNAL WIRE

## (undated) DEVICE — GLOVE SRG BIOGEL ECLIPSE 8

## (undated) DEVICE — PLEDGET CARDIO PTFE 9.5 X 4.8 SOFT LF (6EA/PK)

## (undated) DEVICE — ADHESIVE SKIN HIGH VISCOSITY EXOFIN 1ML

## (undated) DEVICE — SUT PROLENE 5-0 C-1/C-1 36 IN 8321H

## (undated) DEVICE — ACE WRAP 6 IN UNSTERILE

## (undated) DEVICE — EVERGRIP INSERT SET 86MM: Brand: FOGARTY EVERGRIP

## (undated) DEVICE — RADIFOCUS OPTITORQUE ANGIOGRAPHIC CATHETER: Brand: OPTITORQUE

## (undated) DEVICE — SUT ETHIBOND 2-0 SH-1/SH-1 30 IN X763H

## (undated) DEVICE — FILTER SMOKE EVAC VIROSAFE

## (undated) DEVICE — BLADE BEAVER MINI SZ 69

## (undated) DEVICE — TR BAND RADIAL ARTERY COMPRESSION DEVICE: Brand: TR BAND

## (undated) DEVICE — RED RUBBER URETHRAL CATHETER: Brand: DOVER

## (undated) DEVICE — 32 FR RIGHT ANGLE – SOFT PVC CATHETER: Brand: PVC THORACIC CATHETERS

## (undated) DEVICE — OASIS DRAIN, SINGLE, INLINE & ATS COMPATIBLE: Brand: OASIS

## (undated) DEVICE — Device: Brand: RETRACT-O-TAPE 18G X 30.5CM BLUNT NEEDLE

## (undated) DEVICE — DRAPE SHEET THREE QUARTER

## (undated) DEVICE — DRESSING ALLEVYN LIFE HEEL 25 X 25.2CM

## (undated) DEVICE — VASOVIEW HEMOPRO 2: Brand: VASOVIEW HEMOPRO 2

## (undated) DEVICE — HEMOCLIP CARTRIDGE LRG

## (undated) DEVICE — DRAIN JACKSON PRATT 10FR 1/8END: Brand: CARDINAL HEALTH